# Patient Record
Sex: FEMALE | Race: WHITE | NOT HISPANIC OR LATINO | Employment: OTHER | URBAN - METROPOLITAN AREA
[De-identification: names, ages, dates, MRNs, and addresses within clinical notes are randomized per-mention and may not be internally consistent; named-entity substitution may affect disease eponyms.]

---

## 2019-02-27 ENCOUNTER — OFFICE VISIT (OUTPATIENT)
Dept: FAMILY MEDICINE | Facility: CLINIC | Age: 69
End: 2019-02-27
Payer: COMMERCIAL

## 2019-02-27 VITALS
OXYGEN SATURATION: 98 % | HEIGHT: 64 IN | HEART RATE: 57 BPM | TEMPERATURE: 98 F | WEIGHT: 211 LBS | DIASTOLIC BLOOD PRESSURE: 80 MMHG | BODY MASS INDEX: 36.02 KG/M2 | SYSTOLIC BLOOD PRESSURE: 132 MMHG

## 2019-02-27 DIAGNOSIS — Z76.89 ENCOUNTER TO ESTABLISH CARE: Primary | ICD-10-CM

## 2019-02-27 DIAGNOSIS — Z11.59 NEED FOR HEPATITIS C SCREENING TEST: ICD-10-CM

## 2019-02-27 DIAGNOSIS — Z11.4 ENCOUNTER FOR SCREENING FOR HIV: ICD-10-CM

## 2019-02-27 DIAGNOSIS — E66.01 MORBID OBESITY (H): ICD-10-CM

## 2019-02-27 DIAGNOSIS — I10 ESSENTIAL HYPERTENSION: ICD-10-CM

## 2019-02-27 LAB
ERYTHROCYTE [DISTWIDTH] IN BLOOD BY AUTOMATED COUNT: 14.3 % (ref 10–15)
HCT VFR BLD AUTO: 41.5 % (ref 35–47)
HGB BLD-MCNC: 13.6 G/DL (ref 11.7–15.7)
MCH RBC QN AUTO: 29.8 PG (ref 26.5–33)
MCHC RBC AUTO-ENTMCNC: 32.8 G/DL (ref 31.5–36.5)
MCV RBC AUTO: 91 FL (ref 78–100)
PLATELET # BLD AUTO: 179 10E9/L (ref 150–450)
RBC # BLD AUTO: 4.56 10E12/L (ref 3.8–5.2)
WBC # BLD AUTO: 6.1 10E9/L (ref 4–11)

## 2019-02-27 PROCEDURE — 99203 OFFICE O/P NEW LOW 30 MIN: CPT | Performed by: FAMILY MEDICINE

## 2019-02-27 PROCEDURE — 84443 ASSAY THYROID STIM HORMONE: CPT | Performed by: FAMILY MEDICINE

## 2019-02-27 PROCEDURE — 85027 COMPLETE CBC AUTOMATED: CPT | Performed by: FAMILY MEDICINE

## 2019-02-27 PROCEDURE — 36415 COLL VENOUS BLD VENIPUNCTURE: CPT | Performed by: FAMILY MEDICINE

## 2019-02-27 PROCEDURE — 87389 HIV-1 AG W/HIV-1&-2 AB AG IA: CPT | Performed by: FAMILY MEDICINE

## 2019-02-27 PROCEDURE — 86803 HEPATITIS C AB TEST: CPT | Performed by: FAMILY MEDICINE

## 2019-02-27 PROCEDURE — 80061 LIPID PANEL: CPT | Performed by: FAMILY MEDICINE

## 2019-02-27 PROCEDURE — 80053 COMPREHEN METABOLIC PANEL: CPT | Performed by: FAMILY MEDICINE

## 2019-02-27 SDOH — HEALTH STABILITY: MENTAL HEALTH: HOW OFTEN DO YOU HAVE A DRINK CONTAINING ALCOHOL?: NEVER

## 2019-02-27 ASSESSMENT — MIFFLIN-ST. JEOR: SCORE: 1482.96

## 2019-02-27 NOTE — PROGRESS NOTES
SUBJECTIVE:   Arline Saini is a 68 year old female who presents to clinic today for the following health issues:      New Patient/Transfer of Care  Just moved here from [place].  She does not speak English.  She declined using a .  Daughter is translating for her.  Per daughter lots of health concerns not really sure where to start.    All medications are from [place].  She brought in a list of her medications but did not bring any translation.  She has about 2-4 weeks supply of medications left.  Daughter knows that she has history of hypertension.  She thinks that she might have liver and kidney problems 2.  She had a gallbladder surgery last year.  She has an echo report and a CT scan of the abdomen report from last year, which is in Spanish language.  The patient tells that she is not very good at reading Spanish language.  Daughter thinks she might have immunization records at home.      Patient reports of no chest pain.  Reports of occasional shortness of breath.  Denies any active shortness of breath right now.  She quit smoking last year.  She was smoking a few cigarettes every day for the last 40 years.  Denies wheezing.  Reports of occasional cough.  Daughter wonders if she may have COPD.        Problem list and histories reviewed & adjusted, as indicated.  Additional history: as documented    Patient Active Problem List   Diagnosis     Essential hypertension     Obesity (BMI 35.0-39.9) with comorbidity (H)     Past Surgical History:   Procedure Laterality Date     CHOLECYSTECTOMY  2018       Social History     Tobacco Use     Smoking status: Never Smoker     Smokeless tobacco: Never Used   Substance Use Topics     Alcohol use: No     Frequency: Never     Family History   Problem Relation Age of Onset     Cerebrovascular Disease Mother      Cerebrovascular Disease Sister          No current outpatient medications on file.     No Known Allergies    Reviewed and updated as needed this  "visit by clinical staff  Tobacco  Allergies  Meds  Problems  Med Hx  Surg Hx  Fam Hx  Soc Hx        Reviewed and updated as needed this visit by Provider  Problems  Med Hx  Fam Hx         ROS:  CONSTITUTIONAL: NEGATIVE for fever, chills, change in weight  INTEGUMENTARY/SKIN: NEGATIVE for worrisome rashes, moles or lesions  EYES: NEGATIVE for vision changes or irritation  ENT/MOUTH: NEGATIVE for ear, mouth and throat problems  RESP: NEGATIVE for significant cough or SOB  BREAST: NEGATIVE for masses, tenderness or discharge  CV: NEGATIVE for chest pain, palpitations or peripheral edema  GI: NEGATIVE for nausea, abdominal pain, heartburn, or change in bowel habits  : NEGATIVE for frequency, dysuria, or hematuria  MUSCULOSKELETAL: Complaint of back pain which is chronic.  NEURO: NEGATIVE for weakness, dizziness or paresthesias  ENDOCRINE: NEGATIVE for temperature intolerance, skin/hair changes  HEME: NEGATIVE for bleeding problems  PSYCHIATRIC: NEGATIVE for changes in mood or affect    OBJECTIVE:                                                    /80   Pulse 57   Temp 98  F (36.7  C) (Tympanic)   Ht 1.635 m (5' 4.37\")   Wt 95.7 kg (211 lb)   SpO2 98%   BMI 35.80 kg/m    Body mass index is 35.8 kg/m .  GENERAL: healthy, alert and no distress  EYES: Eyes grossly normal to inspection, extraocular movements - intact, and PERRL  HENT: ear canals- normal; TMs- normal; Nose- normal; Mouth- no ulcers, no lesions  NECK: no tenderness, no adenopathy, no asymmetry, no masses, no stiffness; thyroid- normal to palpation  RESP: lungs clear to auscultation - no rales, no rhonchi, no wheezes  CV: regular rates and rhythm, normal S1 S2, no S3 or S4 and no murmur, no click or rub -  ABDOMEN: soft, no tenderness, no  hepatosplenomegaly, no masses, normal bowel sounds  MS: extremities- no gross deformities noted, no edema  SKIN: no suspicious lesions, no rashes  NEURO: strength and tone- normal, sensory exam- " grossly normal, mentation- intact, speech- normal, reflexes- symmetric  PSYCH: Alert and oriented times 3; speech- coherent , normal rate and volume; able to articulate logical thoughts, able to abstract reason, no tangential thoughts, no hallucinations or delusions, affect- normal  LYMPHATICS: ant. cervical- normal, post. cervical- normal, axillary- normal, supraclavicular- normal, inguinal- normal         ASSESSMENT/PLAN:                                                      1. Encounter to establish care  Patient comes in to establish care.  No  available today.  Daughter is not able to translate the medication list which is in Portuguese language.  Patient reports that she has medication to last her for a few weeks.  Instructed to follow-up next week with a  and translation available of the medication list.  Instructed to get immunization records and also possible translation of the echocardiogram report.  - CBC with platelets    2. Essential hypertension  Well-controlled.  Unsure about 1 medication patient is on.  Fasting labs ordered.  Follow-up next week to sort out her medical problems and medications.  - TSH with free T4 reflex  - Lipid panel reflex to direct LDL Fasting  - Comprehensive metabolic panel    3. Encounter for screening for HIV    - HIV Antigen Antibody Combo    4. Need for hepatitis C screening test    - Hepatitis C Screen Reflex to HCV RNA Quant and Genotype    5. Morbid obesity (H)        Follow up with Provider - 1 week     Oksana Smiley MD  [facility] [place]

## 2019-02-27 NOTE — LETTER
March 4, 2019      Arline Saini  [address]  [place] [address]        Dear ,    I have reviewed your recent labs. Here are the results:    -Normal red blood cell (hgb) levels, normal white blood cell count and normal platelet levels.  -Cholesterol levels (LDL,HDL, Triglycerides) are normal.  ADVISE: rechecking in 1 year.   -Liver and gallbladder tests are normal (ALT,AST, Alk phos, bilirubin), kidney function is normal (Cr, GFR), sodium is normal, potassium is normal, calcium is normal, glucose is normal.  -TSH (thyroid stimulating hormone) level is normal which indicates normal thyroid function.  -Hepatitis C antibody screen test shows no signs of a previous hepatitis C infection.  -HIV test is normal.    Results for orders placed or performed in visit on 02/27/19   TSH with free T4 reflex   Result Value Ref Range    TSH 2.61 0.40 - 4.00 mU/L   Lipid panel reflex to direct LDL Fasting   Result Value Ref Range    Cholesterol 156 <200 mg/dL    Triglycerides 54 <150 mg/dL    HDL Cholesterol 55 >49 mg/dL    LDL Cholesterol Calculated 90 <100 mg/dL    Non HDL Cholesterol 101 <130 mg/dL   Comprehensive metabolic panel   Result Value Ref Range    Sodium 141 133 - 144 mmol/L    Potassium 3.9 3.4 - 5.3 mmol/L    Carbon Dioxide 20 20 - 32 mmol/L    Anion Gap 9 3 - 14 mmol/L    Glucose 93 70 - 99 mg/dL    Urea Nitrogen 28 7 - 30 mg/dL    Creatinine 0.90 0.52 - 1.04 mg/dL    GFR Estimate 65 >60 mL/min/[1.73_m2]    GFR Estimate If Black 76 >60 mL/min/[1.73_m2]    Calcium 8.7 8.5 - 10.1 mg/dL    Bilirubin Total 1.2 0.2 - 1.3 mg/dL    Albumin 3.8 3.4 - 5.0 g/dL    Protein Total 7.3 6.8 - 8.8 g/dL    Alkaline Phosphatase 77 40 - 150 U/L    ALT 24 0 - 50 U/L    AST 20 0 - 45 U/L   CBC with platelets   Result Value Ref Range    WBC 6.1 4.0 - 11.0 10e9/L    RBC Count 4.56 3.8 - 5.2 10e12/L    Hemoglobin 13.6 11.7 - 15.7 g/dL    Hematocrit 41.5 35.0 - 47.0 %    MCV 91 78 - 100 fl    MCH 29.8 26.5 - 33.0  pg    MCHC 32.8 31.5 - 36.5 g/dL    RDW 14.3 10.0 - 15.0 %    Platelet Count 179 150 - 450 10e9/L   HIV Antigen Antibody Combo   Result Value Ref Range    HIV Antigen Antibody Combo Nonreactive NR^Nonreactive       Hepatitis C Screen Reflex to HCV RNA Quant and Genotype   Result Value Ref Range    Hepatitis C Antibody Nonreactive NR^Nonreactive             If you have any questions or concerns, please call the clinic at the number listed above.       Sincerely,        Oksana Smiley MD

## 2019-02-28 LAB
ALBUMIN SERPL-MCNC: 3.8 G/DL (ref 3.4–5)
ALP SERPL-CCNC: 77 U/L (ref 40–150)
ALT SERPL W P-5'-P-CCNC: 24 U/L (ref 0–50)
ANION GAP SERPL CALCULATED.3IONS-SCNC: 9 MMOL/L (ref 3–14)
AST SERPL W P-5'-P-CCNC: 20 U/L (ref 0–45)
BILIRUB SERPL-MCNC: 1.2 MG/DL (ref 0.2–1.3)
BUN SERPL-MCNC: 28 MG/DL (ref 7–30)
CALCIUM SERPL-MCNC: 8.7 MG/DL (ref 8.5–10.1)
CHLORIDE SERPL-SCNC: 112 MMOL/L (ref 94–109)
CHOLEST SERPL-MCNC: 156 MG/DL
CO2 SERPL-SCNC: 20 MMOL/L (ref 20–32)
CREAT SERPL-MCNC: 0.9 MG/DL (ref 0.52–1.04)
GFR SERPL CREATININE-BSD FRML MDRD: 65 ML/MIN/{1.73_M2}
GLUCOSE SERPL-MCNC: 93 MG/DL (ref 70–99)
HCV AB SERPL QL IA: NONREACTIVE
HDLC SERPL-MCNC: 55 MG/DL
HIV 1+2 AB+HIV1 P24 AG SERPL QL IA: NONREACTIVE
LDLC SERPL CALC-MCNC: 90 MG/DL
NONHDLC SERPL-MCNC: 101 MG/DL
POTASSIUM SERPL-SCNC: 3.9 MMOL/L (ref 3.4–5.3)
PROT SERPL-MCNC: 7.3 G/DL (ref 6.8–8.8)
SODIUM SERPL-SCNC: 141 MMOL/L (ref 133–144)
TRIGL SERPL-MCNC: 54 MG/DL
TSH SERPL DL<=0.005 MIU/L-ACNC: 2.61 MU/L (ref 0.4–4)

## 2019-04-24 ENCOUNTER — OFFICE VISIT (OUTPATIENT)
Dept: FAMILY MEDICINE | Facility: CLINIC | Age: 69
End: 2019-04-24
Payer: COMMERCIAL

## 2019-04-24 DIAGNOSIS — E66.01 MORBID OBESITY (H): ICD-10-CM

## 2019-04-24 DIAGNOSIS — M54.50 RIGHT-SIDED LOW BACK PAIN WITHOUT SCIATICA, UNSPECIFIED CHRONICITY: ICD-10-CM

## 2019-04-24 DIAGNOSIS — I10 ESSENTIAL HYPERTENSION: Primary | ICD-10-CM

## 2019-04-24 DIAGNOSIS — H91.93 HEARING PROBLEM OF BOTH EARS: ICD-10-CM

## 2019-04-24 PROCEDURE — 99214 OFFICE O/P EST MOD 30 MIN: CPT | Performed by: INTERNAL MEDICINE

## 2019-04-24 RX ORDER — BISOPROLOL FUMARATE 5 MG/1
5 TABLET, FILM COATED ORAL DAILY
Qty: 30 TABLET | Refills: 1 | Status: SHIPPED | OUTPATIENT
Start: 2019-04-24 | End: 2019-06-25

## 2019-04-24 RX ORDER — AMLODIPINE BESYLATE 5 MG/1
5 TABLET ORAL DAILY
Qty: 30 TABLET | Refills: 1 | Status: SHIPPED | OUTPATIENT
Start: 2019-04-24 | End: 2019-04-24

## 2019-04-24 RX ORDER — LOSARTAN POTASSIUM 100 MG/1
100 TABLET ORAL DAILY
Qty: 30 TABLET | Refills: 1 | Status: SHIPPED | OUTPATIENT
Start: 2019-04-24 | End: 2019-05-22

## 2019-04-24 RX ORDER — LOSARTAN POTASSIUM 100 MG/1
100 TABLET ORAL DAILY
Qty: 30 TABLET | Refills: 1 | Status: SHIPPED | OUTPATIENT
Start: 2019-04-24 | End: 2019-04-24

## 2019-04-24 RX ORDER — SPIRONOLACTONE 25 MG/1
25 TABLET ORAL DAILY
Qty: 30 TABLET | Refills: 1 | Status: SHIPPED | OUTPATIENT
Start: 2019-04-24 | End: 2019-04-24

## 2019-04-24 RX ORDER — AMLODIPINE BESYLATE 5 MG/1
5 TABLET ORAL DAILY
Qty: 30 TABLET | Refills: 1 | Status: SHIPPED | OUTPATIENT
Start: 2019-04-24 | End: 2019-05-15

## 2019-04-24 RX ORDER — BISOPROLOL FUMARATE 5 MG/1
5 TABLET, FILM COATED ORAL DAILY
Qty: 30 TABLET | Refills: 1 | Status: SHIPPED | OUTPATIENT
Start: 2019-04-24 | End: 2019-04-24

## 2019-04-24 RX ORDER — SPIRONOLACTONE 25 MG/1
25 TABLET ORAL DAILY
Qty: 30 TABLET | Refills: 1 | Status: SHIPPED | OUTPATIENT
Start: 2019-04-24 | End: 2019-06-25

## 2019-04-24 NOTE — PROGRESS NOTES
SUBJECTIVE:   Arline Saini is a 68 year old female who presents to clinic today for the following   health issues:      Patient is here with her daughter today for follow up: She last saw Dr. Smiley in February to establish care. She speaks only Cypriot and is accompanied by her daughter as an  since she refuses a formal . At her last visit she brought in her current medications but due to poor translation Dr. Smiley was unable to determine what the medications were.     Arline tells me (through her daughter) that she has hypertension and is very bothered by back pain and right-sided rib pain. She also has had her gallbladder removed. Her back and ribs bother her with yard work and house work.     Medications brought in today:  Lercanidipine 10 mg, Losartan 100 mg, Spironolactone 25 mg, and Bisoprolol 5 mg.       Additional history: as documented    Reviewed  and updated as needed this visit by clinical staff         Reviewed and updated as needed this visit by Provider         Patient Active Problem List   Diagnosis     Essential hypertension     Obesity (BMI 35.0-39.9) with comorbidity (H)     Past Surgical History:   Procedure Laterality Date     CHOLECYSTECTOMY         Social History     Tobacco Use     Smoking status: Former Smoker     Last attempt to quit: 2018     Years since quittin.1     Smokeless tobacco: Never Used     Tobacco comment: Smoked a few cigarettes every day for 40 years.   Substance Use Topics     Alcohol use: No     Frequency: Never     Family History   Problem Relation Age of Onset     Cerebrovascular Disease Mother      Cerebrovascular Disease Sister            ROS:  Constitutional, HEENT, cardiovascular, pulmonary, gi and gu systems are negative, except as otherwise noted.    OBJECTIVE:     /88   Pulse 70   Temp 97.6  F (36.4  C) (Tympanic)   Wt 98.4 kg (217 lb)   SpO2 97%   BMI 36.82 kg/m    Body mass index is 36.82 kg/m .  GENERAL:  healthy, alert and no distress  NECK: no adenopathy, no asymmetry, masses, or scars and thyroid normal to palpation  RESP: lungs clear to auscultation - no rales, rhonchi or wheezes  CV: regular rate and rhythm, normal S1 S2, no S3 or S4, no murmur, click or rub, no peripheral edema and peripheral pulses strong  ABDOMEN: soft, nontender, no hepatosplenomegaly, no masses and bowel sounds normal  MS: no gross musculoskeletal defects noted, no edema  Comprehensive back pain exam:  Range of motion not limited by pain, Lower extremity strength functional and equal on both sides, Lower extremity reflexes within normal limits bilaterally and Straight leg raise negative bilaterally    Diagnostic Test Results:  No results found for this or any previous visit (from the past 24 hour(s)).    ASSESSMENT/PLAN:       1. Essential hypertension  Refilling patients medications today and substituting amlodipine 5 mg for Lercanidipine 10 mg (I am assuming this medication is a calcium channel blocker based on it's name). We may need to titrate these medications up or down depending on response. I am recommending an echocardiogram and have ordered this for her. I am also placing a referral to cardiology per daughter's request.   - CARDIOLOGY EVAL ADULT REFERRAL  - Echocardiogram Complete; Future  - amLODIPine (NORVASC) 5 MG tablet; Take 1 tablet (5 mg) by mouth daily  Dispense: 30 tablet; Refill: 1  - bisoprolol (ZEBETA) 5 MG tablet; Take 1 tablet (5 mg) by mouth daily  Dispense: 30 tablet; Refill: 1  - losartan (COZAAR) 100 MG tablet; Take 1 tablet (100 mg) by mouth daily  Dispense: 30 tablet; Refill: 1  - spironolactone (ALDACTONE) 25 MG tablet; Take 1 tablet (25 mg) by mouth daily  Dispense: 30 tablet; Refill: 1    2. Obesity (BMI 35.0-39.9) with comorbidity (H)    3. Right-sided low back pain without sciatica, unspecified chronicity  No red flags on exam today. Recommending physical therapy.   - [facility]; Future  - UA with Microscopic reflex to Culture; Future  - Urine Culture Aerobic Bacterial    4. Hearing problem of both ears  - AUDIOLOGY ADULT REFERRAL    Follow up with me in 4 weeks    Chel Martinez MD  [facility] [place]

## 2019-04-25 VITALS
OXYGEN SATURATION: 97 % | BODY MASS INDEX: 36.82 KG/M2 | DIASTOLIC BLOOD PRESSURE: 88 MMHG | TEMPERATURE: 97.6 F | SYSTOLIC BLOOD PRESSURE: 136 MMHG | HEART RATE: 70 BPM | WEIGHT: 217 LBS

## 2019-04-25 DIAGNOSIS — M54.50 RIGHT-SIDED LOW BACK PAIN WITHOUT SCIATICA, UNSPECIFIED CHRONICITY: ICD-10-CM

## 2019-04-25 LAB
ALBUMIN UR-MCNC: NEGATIVE MG/DL
APPEARANCE UR: CLEAR
BACTERIA #/AREA URNS HPF: ABNORMAL /HPF
BILIRUB UR QL STRIP: NEGATIVE
COLOR UR AUTO: YELLOW
GLUCOSE UR STRIP-MCNC: NEGATIVE MG/DL
HGB UR QL STRIP: NEGATIVE
KETONES UR STRIP-MCNC: NEGATIVE MG/DL
LEUKOCYTE ESTERASE UR QL STRIP: ABNORMAL
NITRATE UR QL: NEGATIVE
NON-SQ EPI CELLS #/AREA URNS LPF: ABNORMAL /LPF
PH UR STRIP: 5.5 PH (ref 5–7)
RBC #/AREA URNS AUTO: ABNORMAL /HPF
SOURCE: ABNORMAL
SP GR UR STRIP: 1.02 (ref 1–1.03)
UROBILINOGEN UR STRIP-ACNC: 0.2 EU/DL (ref 0.2–1)
WBC #/AREA URNS AUTO: ABNORMAL /HPF

## 2019-04-25 PROCEDURE — 87086 URINE CULTURE/COLONY COUNT: CPT | Performed by: INTERNAL MEDICINE

## 2019-04-25 PROCEDURE — 81001 URINALYSIS AUTO W/SCOPE: CPT | Performed by: INTERNAL MEDICINE

## 2019-04-26 LAB
BACTERIA SPEC CULT: NORMAL
BACTERIA SPEC CULT: NORMAL
SPECIMEN SOURCE: NORMAL

## 2019-04-29 ENCOUNTER — THERAPY VISIT (OUTPATIENT)
Dept: PHYSICAL THERAPY | Facility: CLINIC | Age: 69
End: 2019-04-29
Payer: COMMERCIAL

## 2019-04-29 DIAGNOSIS — M54.50 RIGHT-SIDED LOW BACK PAIN WITHOUT SCIATICA, UNSPECIFIED CHRONICITY: ICD-10-CM

## 2019-04-29 DIAGNOSIS — M54.6 THORACOLUMBAR BACK PAIN: ICD-10-CM

## 2019-04-29 DIAGNOSIS — M54.50 THORACOLUMBAR BACK PAIN: ICD-10-CM

## 2019-04-29 PROCEDURE — 97110 THERAPEUTIC EXERCISES: CPT | Mod: GP | Performed by: PHYSICAL THERAPIST

## 2019-04-29 PROCEDURE — 97161 PT EVAL LOW COMPLEX 20 MIN: CPT | Mod: GP | Performed by: PHYSICAL THERAPIST

## 2019-04-29 NOTE — PROGRESS NOTES
[facility] Initial Evaluation  Subjective:  Patient is referred with a long hx of thoracolumbar pain, R>L. Sxs are intermittent and worse with activity, especially lifting, carrying and twisting to the right. She is uncomfortable lying on her back and sleeps on her sides. No pain with cough/sneeze. No dx testing performed to this point.    The history is provided by the patient and a relative. A  was used.   Arline Saini is a 68 year old female with a thoracic spine condition.  Condition occurred with:  Insidious onset.  Condition occurred: for unknown reasons.  This is a chronic condition     Patient reports pain:  Mid thoracic, thoracic right side and lower thoracic.  Radiates to: rib cage right.  Pain is described as aching and is intermittent and reported as 5/10.  Associated symptoms:  Loss of motion/stiffness. Pain is worse during the day.  Symptoms are exacerbated by lying down, carrying and lifting (trunk rotation right) and relieved by nothing.  Since onset symptoms are unchanged.        General health as reported by patient is good.  Pertinent medical history includes:  High blood pressure and heart problems.      Current medications:  High blood pressure medication.          Barriers include:  None as reported by the patient.    Red flags:  None as reported by the patient.                        Objective:  System    Physical Exam                           Asad Thoracic Evalution:  Posture:  Sitting: poor  Standing: fair  Protruded Head: no  Kyphosis: accentuated  Correction of Posture: no effect    Movement Loss:  Flexion (Flex):  Nil  Extension (EXT): mod and pain  Rotation Lt (ROT L): nil  Rotation Rt (ROT R): min and pain  Cervical Differential:                Rep Flex:  During:  Increases  After: worse  Mechanical Response:  No effect  Test Movements:  Flexion:  During:  Increases  After: no worse  Mechanical Response:  No effect  Rep Flexion:   During:  Increases  After: worse  Mechanical Response: no effect  Extension:  During:  Increases  After: no worse  Mechanical Response: no effect  Rep Extension:  During:  Increases  After: better  Mechanical Response: IncROM    Pretest symptoms: no pain          Pretest symptoms--Sitting:  No pain  Rot Rt:  During:  Produces  After: no worse  Mechanical Response: no effect  Rep ROT Rt: During:  Produces  After: worse  Mechanical Response: no effect      Static Tests:  Extension:  Prone (no change)    Conclusion: other  Principle of Treatment:      Extension:  Trial of thoracic extension in sitting x 10/ 4 x daily    Other:  Prone lying and ACE 4 x daily                  ROS    Assessment/Plan:    Patient is a 68 year old female with thoracic and lumbar complaints.    Patient has the following significant findings with corresponding treatment plan.                Diagnosis 1:  Thoracolumbar pain  Pain -  manual therapy, self management, education, directional preference exercise and home program  Decreased ROM/flexibility - manual therapy, therapeutic exercise and home program  Decreased joint mobility - manual therapy, therapeutic exercise and home program  Decreased function - therapeutic activities and home program  Impaired posture - neuro re-education and home program    Therapy Evaluation Codes:   1) History comprised of:   Personal factors that impact the plan of care:      Language and Time since onset of symptoms.    Comorbidity factors that impact the plan of care are:      None.     Medications impacting care: None.  2) Examination of Body Systems comprised of:   Body structures and functions that impact the plan of care:      Lumbar spine and Thoracic Spine.   Activity limitations that impact the plan of care are:      Bending and Lifting.  3) Clinical presentation characteristics are:   Stable/Uncomplicated.  4) Decision-Making    Low complexity using standardized patient assessment instrument and/or  measureable assessment of functional outcome.  Cumulative Therapy Evaluation is: Low complexity.    Previous and current functional limitations:  (See Goal Flow Sheet for this information)    Short term and Long term goals: (See Goal Flow Sheet for this information)     Communication ability:  Patient has an  for communication clarity.  Treatment Explanation - The following has been discussed with the patient:   RX ordered/plan of care  Anticipated outcomes  Possible risks and side effects  This patient would benefit from PT intervention to resume normal activities.   Rehab potential is good.    Frequency:  1 X week, once daily  Duration:  for 4 weeks  Discharge Plan:  Achieve all LTG.  Independent in home treatment program.  Reach maximal therapeutic benefit.    Please refer to the daily flowsheet for treatment today, total treatment time and time spent performing 1:1 timed codes.

## 2019-04-29 NOTE — LETTER
[facility]  [address] [place] [address]  [address]  [facility] [id number] [phone]    2019    Re: Arline Saini   :   1950  MRN:  [id number]   REFERRING PHYSICIAN:   Chel Martinez    [facility]    Date of Initial Evaluation:  19  Visits:  Rxs Used: 1  Reason for Referral:     Right-sided low back pain without sciatica, unspecified chronicity  Thoracolumbar back pain    EVALUATION SUMMARY    [facility]  Subjective:  Patient is referred with a long hx of thoracolumbar pain, R>L. Sxs are intermittent and worse with activity, especially lifting, carrying and twisting to the right. She is uncomfortable lying on her back and sleeps on her sides. No pain with cough/sneeze. No dx testing performed to this point.  The history is provided by the patient and a relative. A  was used.   Arline Saini is a 68 year old female with a thoracic spine condition.  Condition occurred with:  Insidious onset.  Condition occurred: for unknown reasons.  This is a chronic condition     Patient reports pain:  Mid thoracic, thoracic right side and lower thoracic.  Radiates to: rib cage right.  Pain is described as aching and is intermittent and reported as 5/10.  Associated symptoms:  Loss of motion/stiffness. Pain is worse during the day.  Symptoms are exacerbated by lying down, carrying and lifting (trunk rotation right) and relieved by nothing.  Since onset symptoms are unchanged.        General health as reported by patient is good.  Pertinent medical history includes:  High blood pressure and heart problems. Current medications:  High blood pressure medication.  Barriers include:  None as reported by the patient.Red flags:  None as reported by the patient.  Objective:  System  Physical Exam                        Asad Thoracic  Evalution:  Posture:  Sitting: poor  Standing: fair  Protruded Head: no  Kyphosis: accentuated  Correction of Posture: no effect  Movement Loss:  Flexion (Flex):  Nil  Re: Arline Saini   :   1950    Extension (EXT): mod and pain  Rotation Lt (ROT L): nil  Rotation Rt (ROT R): min and pain  Cervical Differential:  Rep Flex:  During:  Increases  After: worse  Mechanical Response:  No effect  Test Movements:  Flexion:  During:  Increases  After: no worse  Mechanical Response:  No effect  Rep Flexion:  During:  Increases  After: worse  Mechanical Response: no effect  Extension:  During:  Increases  After: no worse  Mechanical Response: no effect  Rep Extension:  During:  Increases  After: better  Mechanical Response: IncROM  Pretest symptoms: no pain  Pretest symptoms--Sitting:  No pain  Rot Rt:  During:  Produces  After: no worse  Mechanical Response: no effect  Rep ROT Rt: During:  Produces  After: worse  Mechanical Response: no effect  Static Tests:  Extension:  Prone (no change)  Conclusion: other  Principle of Treatment:  Extension:  Trial of thoracic extension in sitting x 10/ 4 x daily  Other:  Prone lying and ACE 4 x daily                Assessment/Plan:    Patient is a 68 year old female with thoracic and lumbar complaints.    Patient has the following significant findings with corresponding treatment plan.                Diagnosis 1:  Thoracolumbar pain  Pain -  manual therapy, self management, education, directional preference exercise and home program  Decreased ROM/flexibility - manual therapy, therapeutic exercise and home program  Decreased joint mobility - manual therapy, therapeutic exercise and home program  Decreased function - therapeutic activities and home program  Impaired posture - neuro re-education and home program  Therapy Evaluation Codes:   1) History comprised of:   Personal factors that impact the plan of care:      Language and Time since onset of symptoms.    Comorbidity  factors that impact the plan of care are:      None.     Medications impacting care: None.  2) Examination of Body Systems comprised of:   Body structures and functions that impact the plan of care:      Lumbar spine and Thoracic Spine.   Activity limitations that impact the plan of care are:      Bending and Lifting.  3) Clinical presentation characteristics are:   Stable/Uncomplicated.  4) Decision-Making    Low complexity using standardized patient assessment instrument and/or measureable assessment of functional outcome.  Cumulative Therapy Evaluation is: Low complexity.  Previous and current functional limitations:  (See Goal Flow Sheet for this information)    Short term and Long term goals: (See Goal Flow Sheet for this information)   Re: Arline Saini   :   1950    Communication ability:  Patient has an  for communication clarity.  Treatment Explanation - The following has been discussed with the patient:   RX ordered/plan of care  Anticipated outcomes; Possible risks and side effects.  This patient would benefit from PT intervention to resume normal activities.   Rehab potential is good.  Frequency:  1 X week, once daily  Duration:  for 4 weeks  Discharge Plan:  Achieve all LTG.  Independent in home treatment program.  Reach maximal therapeutic benefit.    Thank you for your referral.    INQUIRIES  Therapist: Rick Andrea, PT  [facility] [place] [facility]  [address] [place] [address] Drive  [address]  [facility] [address]  Phone: [phone]  Fax: [phone]

## 2019-05-02 ENCOUNTER — HOSPITAL ENCOUNTER (OUTPATIENT)
Dept: CARDIOLOGY | Facility: CLINIC | Age: 69
Discharge: HOME | End: 2019-05-02
Attending: INTERNAL MEDICINE | Admitting: INTERNAL MEDICINE
Payer: COMMERCIAL

## 2019-05-02 DIAGNOSIS — I10 ESSENTIAL HYPERTENSION: ICD-10-CM

## 2019-05-02 PROCEDURE — 93306 TTE W/DOPPLER COMPLETE: CPT | Mod: 26 | Performed by: INTERNAL MEDICINE

## 2019-05-02 PROCEDURE — 93306 TTE W/DOPPLER COMPLETE: CPT

## 2019-05-15 ENCOUNTER — OFFICE VISIT (OUTPATIENT)
Dept: CARDIOLOGY | Facility: CLINIC | Age: 69
End: 2019-05-15
Attending: INTERNAL MEDICINE
Payer: COMMERCIAL

## 2019-05-15 VITALS
SYSTOLIC BLOOD PRESSURE: 124 MMHG | BODY MASS INDEX: 35.56 KG/M2 | WEIGHT: 213.4 LBS | DIASTOLIC BLOOD PRESSURE: 88 MMHG | HEART RATE: 60 BPM | HEIGHT: 65 IN

## 2019-05-15 DIAGNOSIS — R06.09 DOE (DYSPNEA ON EXERTION): ICD-10-CM

## 2019-05-15 DIAGNOSIS — I10 ESSENTIAL HYPERTENSION: Primary | ICD-10-CM

## 2019-05-15 PROCEDURE — 93000 ELECTROCARDIOGRAM COMPLETE: CPT | Performed by: INTERNAL MEDICINE

## 2019-05-15 PROCEDURE — 99204 OFFICE O/P NEW MOD 45 MIN: CPT | Performed by: INTERNAL MEDICINE

## 2019-05-15 ASSESSMENT — MIFFLIN-ST. JEOR: SCORE: 1490.92

## 2019-05-15 NOTE — LETTER
5/15/2019    [address] [address]    RE: Arline Saini       Dear Colleague,    I had the pleasure of seeing Arline Saini in the [facility].    HPI and Plan:   See dictation(#[id number])    Orders Placed This Encounter   Procedures     MRI Cardiac w/contrast and stress     Follow-Up with Cardiac Advanced Practice Provider     EKG 12-lead complete w/read - Clinics (performed today)       Orders Placed This Encounter   Medications     NIFEdipine ER (ADALAT CC) 60 MG 24 hr tablet     Sig: Take 1 tablet (60 mg) by mouth daily     Dispense:  90 tablet     Refill:  3       Medications Discontinued During This Encounter   Medication Reason     amLODIPine (NORVASC) 5 MG tablet          Encounter Diagnoses   Name Primary?     Essential hypertension Yes     IGLESIAS (dyspnea on exertion)        CURRENT MEDICATIONS:  Current Outpatient Medications   Medication Sig Dispense Refill     bisoprolol (ZEBETA) 5 MG tablet Take 1 tablet (5 mg) by mouth daily 30 tablet 1     losartan (COZAAR) 100 MG tablet Take 1 tablet (100 mg) by mouth daily 30 tablet 1     NIFEdipine ER (ADALAT CC) 60 MG 24 hr tablet Take 1 tablet (60 mg) by mouth daily 90 tablet 3     spironolactone (ALDACTONE) 25 MG tablet Take 1 tablet (25 mg) by mouth daily 30 tablet 1       ALLERGIES   No Known Allergies    PAST MEDICAL HISTORY:  Past Medical History:   Diagnosis Date     Essential hypertension 2/27/2019     Obesity (BMI 35.0-39.9) with comorbidity (H) 2/27/2019       PAST SURGICAL HISTORY:  Past Surgical History:   Procedure Laterality Date     CHOLECYSTECTOMY  2018       FAMILY HISTORY:  Family History   Problem Relation Age of Onset     Cerebrovascular Disease Mother      Cerebrovascular Disease Sister 40     Unknown/Adopted Father        SOCIAL HISTORY:  Social History     Socioeconomic History     Marital status:      Spouse name: None     Number of children:  None     Years of education: None     Highest education level: None   Occupational History     None   Social Needs     Financial resource strain: None     Food insecurity:     Worry: None     Inability: None     Transportation needs:     Medical: None     Non-medical: None   Tobacco Use     Smoking status: Former Smoker     Last attempt to quit: 2018     Years since quittin.2     Smokeless tobacco: Never Used     Tobacco comment: Smoked a few cigarettes every day for 40 years.   Substance and Sexual Activity     Alcohol use: No     Frequency: Never     Drug use: No     Sexual activity: Never   Lifestyle     Physical activity:     Days per week: None     Minutes per session: None     Stress: None   Relationships     Social connections:     Talks on phone: None     Gets together: None     Attends Rastafarian service: None     Active member of club or organization: None     Attends meetings of clubs or organizations: None     Relationship status: None     Intimate partner violence:     Fear of current or ex partner: None     Emotionally abused: None     Physically abused: None     Forced sexual activity: None   Other Topics Concern     Parent/sibling w/ CABG, MI or angioplasty before 65F 55M? Not Asked   Social History Narrative     None       Review of Systems:  Skin:  Negative       Eyes:  Positive for glasses    ENT:  Negative      Respiratory:  Positive for       Cardiovascular:  Negative;syncope or near-syncope;cyanosis;palpitations;fatigue;lightheadedness;dizziness Positive for;chest pain edema, LE, since starting amlodipine  Gastroenterology: Negative      Genitourinary:  Positive for decreased urinary stream;retention    Musculoskeletal:  Positive for back pain muscle aches  Neurologic:  Positive for numbness or tingling of feet    Psychiatric:         Heme/Lymph/Imm:  Negative      Endocrine:  Negative        Physical Exam:  Vitals: /88 (BP Location: Left arm, Cuff Size: Adult Large)   Pulse 60   " Ht 1.638 m (5' 4.5\")   Wt 96.8 kg (213 lb 6.4 oz)   BMI 36.06 kg/m       Constitutional:           Skin:             Head:           Eyes:           Lymph:      ENT:           Neck:           Respiratory:            Cardiac:                                                           GI:           Extremities and Muscular Skeletal:                 Neurological:           Psych:             CC  Chel Martinez MD  [address] [place] [id number]                    Thank you for allowing me to participate in the care of your patient.      Sincerely,     Nigel Walker MD     [facility]    cc:   Chel Martinez MD  [address] [place] [id number]        "

## 2019-05-15 NOTE — LETTER
5/15/2019      Chel Martinez MD  [address] [place] [address]      RE: Arline Saini       Dear Colleague,    I had the pleasure of seeing Arline Saini in the [facility].    Service Date: 05/15/2019      REASON FOR CARDIOLOGY VISIT:  Hypertension.      HISTORY OF PRESENT ILLNESS:  Ms. Saini is a very pleasant 69-year-old female originally from [facility], who is coming to Cardiology Clinic accompanied by her daughter.  The patient has longstanding history of hypertension and was recently seen by her primary care physician, Dr. Martinez.  It looks like she was on 100 mg of losartan, 25 mg of spironolactone, 5 mg of bisoprolol, and she was also recently on 10 mg of lercanidipine.  The lercanidipine, because of unavailability of the same medication, was replaced appropriately with amlodipine 5 mg daily.  Her blood pressure is reasonably controlled at 124/88.  The patient did notice mild swelling over the lower extremity with amlodipine, which she did not notice with the previous calcium channel blocker.  She does have shortness of breath which is longstanding.  This happens with exertion.  It is not progressive.  No chest discomfort.  The patient used to work as a  back in [facility].  Her weight is 213 pounds with a BMI of 36, and she has always weighed around the same region.  She does not do any dedicated exercise.  She does not have diabetes.  She used to smoke but has quit for many years.  There is no family history of premature coronary artery disease.  The patient does not have diabetes or any known dyslipidemia.  EKG today shows sinus bradycardia.  No dizziness, presyncope or syncope.      PHYSICAL EXAMINATION:   VITAL SIGNS:  Blood pressure 124/88, heart rate 60 regular, weight 213 pounds, BMI 36.   GENERAL:  The patient appears pleasant, comfortable.   NECK:  Normal JVP, no bruit.   CARDIOVASCULAR SYSTEM:  S1, S2 normal, no  murmur, rub or gallop.   RESPIRATORY SYSTEM:  Clear to auscultation bilaterally.   GASTROINTESTINAL SYSTEM:  Abdomen soft, nontender.   EXTREMITIES:  No pitting pedal edema.   NEUROLOGICAL:  Alert, oriented x3.   PSYCH:  Normal affect.   SKIN:  No obvious rash.   HEENT:  No pallor or icterus.      Echocardiogram done recently shows LVEF of 55%-60% with mild LVH.  Ascending aorta was noted to be mildly dilated at 4 cm.      IMPRESSION AND PLAN:  A pleasant 69-year-old female with longstanding history of hypertension, seems well controlled on current combination of medications noted above.  She has noticed some swelling with amlodipine.  I discussed the option of continuing amlodipine if the mild swelling is tolerable versus using alternative medication like a diuretic like hydrochlorothiazide.  The patient and patient's daughter tell me that they would prefer not to use hydrochlorothiazide and would like to replace something in the similar category like calcium channel blocker.  One option is to try nifedipine extended-release and see if she has any leg swelling with that medication, as with the previous channel blocker as noted above, she did not have any leg swelling.  I recommend stopping amlodipine and starting nifedipine extended-release 60 mg daily.  She can continue followup with Dr. Martinez regarding continued hypertension management, as it seems to be well controlled on current regimen.  In future if she does not tolerate nifedipine, one option is to use the hydrochlorothiazide at that time.  She does complain of dyspnea on exertion which is longstanding.  This could be from obesity, physical deconditioning.  Anginal equivalent cannot be completely ruled out.  At this time, I recommend doing stress testing.  We discussed option of cardiac MRI stress testing, and patient is willing to proceed with that.  I am recommending a followup in about a month with an PRICILLA to go over the results of the stress testing, and  if no significant abnormalities are seen, we will be happy to see her in Cardiology Clinic on as-needed basis.  She will continue to follow up with Dr. Martinez regarding hypertension.  Mildly dilated ascending aorta was noted on echocardiogram.  I would recommend that she can have a repeat echocardiogram through her primary care physician in about a year's time to follow up on the mildly dilated ascending aorta.      cc:   [place] [address]         FELIX WALKER MD             D: 05/15/2019   T: 05/15/2019   MT: MARÍA      Name:     SARA MCKEON   MRN:      [id number]        Account:      [id number]   :      1950           Service Date: 05/15/2019      Document: [id number]         Outpatient Encounter Medications as of 5/15/2019   Medication Sig Dispense Refill     bisoprolol (ZEBETA) 5 MG tablet Take 1 tablet (5 mg) by mouth daily 30 tablet 1     losartan (COZAAR) 100 MG tablet Take 1 tablet (100 mg) by mouth daily 30 tablet 1     NIFEdipine ER (ADALAT CC) 60 MG 24 hr tablet Take 1 tablet (60 mg) by mouth daily 90 tablet 3     spironolactone (ALDACTONE) 25 MG tablet Take 1 tablet (25 mg) by mouth daily 30 tablet 1     [DISCONTINUED] amLODIPine (NORVASC) 5 MG tablet Take 1 tablet (5 mg) by mouth daily 30 tablet 1     No facility-administered encounter medications on file as of 5/15/2019.        Again, thank you for allowing me to participate in the care of your patient.      Sincerely,    Felix Walker MD     [facility]

## 2019-05-15 NOTE — PROGRESS NOTES
HPI and Plan:   See dictation(#[id number])    Orders Placed This Encounter   Procedures     MRI Cardiac w/contrast and stress     Follow-Up with Cardiac Advanced Practice Provider     EKG 12-lead complete w/read - Clinics (performed today)       Orders Placed This Encounter   Medications     NIFEdipine ER (ADALAT CC) 60 MG 24 hr tablet     Sig: Take 1 tablet (60 mg) by mouth daily     Dispense:  90 tablet     Refill:  3       Medications Discontinued During This Encounter   Medication Reason     amLODIPine (NORVASC) 5 MG tablet          Encounter Diagnoses   Name Primary?     Essential hypertension Yes     IGLESIAS (dyspnea on exertion)        CURRENT MEDICATIONS:  Current Outpatient Medications   Medication Sig Dispense Refill     bisoprolol (ZEBETA) 5 MG tablet Take 1 tablet (5 mg) by mouth daily 30 tablet 1     losartan (COZAAR) 100 MG tablet Take 1 tablet (100 mg) by mouth daily 30 tablet 1     NIFEdipine ER (ADALAT CC) 60 MG 24 hr tablet Take 1 tablet (60 mg) by mouth daily 90 tablet 3     spironolactone (ALDACTONE) 25 MG tablet Take 1 tablet (25 mg) by mouth daily 30 tablet 1       ALLERGIES   No Known Allergies    PAST MEDICAL HISTORY:  Past Medical History:   Diagnosis Date     Essential hypertension 2/27/2019     Obesity (BMI 35.0-39.9) with comorbidity (H) 2/27/2019       PAST SURGICAL HISTORY:  Past Surgical History:   Procedure Laterality Date     CHOLECYSTECTOMY  2018       FAMILY HISTORY:  Family History   Problem Relation Age of Onset     Cerebrovascular Disease Mother      Cerebrovascular Disease Sister 40     Unknown/Adopted Father        SOCIAL HISTORY:  Social History     Socioeconomic History     Marital status:      Spouse name: None     Number of children: None     Years of education: None     Highest education level: None   Occupational History     None   Social Needs     Financial resource strain: None     Food insecurity:     Worry: None     Inability: None     Transportation needs:      "Medical: None     Non-medical: None   Tobacco Use     Smoking status: Former Smoker     Last attempt to quit: 2018     Years since quittin.2     Smokeless tobacco: Never Used     Tobacco comment: Smoked a few cigarettes every day for 40 years.   Substance and Sexual Activity     Alcohol use: No     Frequency: Never     Drug use: No     Sexual activity: Never   Lifestyle     Physical activity:     Days per week: None     Minutes per session: None     Stress: None   Relationships     Social connections:     Talks on phone: None     Gets together: None     Attends Jew service: None     Active member of club or organization: None     Attends meetings of clubs or organizations: None     Relationship status: None     Intimate partner violence:     Fear of current or ex partner: None     Emotionally abused: None     Physically abused: None     Forced sexual activity: None   Other Topics Concern     Parent/sibling w/ CABG, MI or angioplasty before 65F 55M? Not Asked   Social History Narrative     None       Review of Systems:  Skin:  Negative       Eyes:  Positive for glasses    ENT:  Negative      Respiratory:  Positive for       Cardiovascular:  Negative;syncope or near-syncope;cyanosis;palpitations;fatigue;lightheadedness;dizziness Positive for;chest pain edema, LE, since starting amlodipine  Gastroenterology: Negative      Genitourinary:  Positive for decreased urinary stream;retention    Musculoskeletal:  Positive for back pain muscle aches  Neurologic:  Positive for numbness or tingling of feet    Psychiatric:         Heme/Lymph/Imm:  Negative      Endocrine:  Negative        Physical Exam:  Vitals: /88 (BP Location: Left arm, Cuff Size: Adult Large)   Pulse 60   Ht 1.638 m (5' 4.5\")   Wt 96.8 kg (213 lb 6.4 oz)   BMI 36.06 kg/m      Constitutional:           Skin:             Head:           Eyes:           Lymph:      ENT:           Neck:           Respiratory:            Cardiac:            "                                                GI:           Extremities and Muscular Skeletal:                 Neurological:           Psych:             CC  Chel Martinez MD  [address] [place] [id number]

## 2019-05-15 NOTE — PROGRESS NOTES
Service Date: 05/15/2019      REASON FOR CARDIOLOGY VISIT:  Hypertension.      HISTORY OF PRESENT ILLNESS:  Ms. Saini is a very pleasant 69-year-old female originally from [facility], who is coming to Cardiology Clinic accompanied by her daughter.  The patient has longstanding history of hypertension and was recently seen by her primary care physician, Dr. Martinez.  It looks like she was on 100 mg of losartan, 25 mg of spironolactone, 5 mg of bisoprolol, and she was also recently on 10 mg of lercanidipine.  The lercanidipine, because of unavailability of the same medication, was replaced appropriately with amlodipine 5 mg daily.  Her blood pressure is reasonably controlled at 124/88.  The patient did notice mild swelling over the lower extremity with amlodipine, which she did not notice with the previous calcium channel blocker.  She does have shortness of breath which is longstanding.  This happens with exertion.  It is not progressive.  No chest discomfort.  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  Her weight is 213 pounds with a BMI of 36, and she has always weighed around the same region.  She does not do any dedicated exercise.  She does not have diabetes.  She used to smoke but has quit for many years.  There is no family history of premature coronary artery disease.  The patient does not have diabetes or any known dyslipidemia.  EKG today shows sinus bradycardia.  No dizziness, presyncope or syncope.      PHYSICAL EXAMINATION:   VITAL SIGNS:  Blood pressure 124/88, heart rate 60 regular, weight 213 pounds, BMI 36.   GENERAL:  The patient appears pleasant, comfortable.   NECK:  Normal JVP, no bruit.   CARDIOVASCULAR SYSTEM:  S1, S2 normal, no murmur, rub or gallop.   RESPIRATORY SYSTEM:  Clear to auscultation bilaterally.   GASTROINTESTINAL SYSTEM:  Abdomen soft, nontender.   EXTREMITIES:  No pitting pedal edema.   NEUROLOGICAL:  Alert, oriented x3.   PSYCH:  Normal affect.    SKIN:  No obvious rash.   HEENT:  No pallor or icterus.      Echocardiogram done recently shows LVEF of 55%-60% with mild LVH.  Ascending aorta was noted to be mildly dilated at 4 cm.      IMPRESSION AND PLAN:  A pleasant 69-year-old female with longstanding history of hypertension, seems well controlled on current combination of medications noted above.  She has noticed some swelling with amlodipine.  I discussed the option of continuing amlodipine if the mild swelling is tolerable versus using alternative medication like a diuretic like hydrochlorothiazide.  The patient and patient's daughter tell me that they would prefer not to use hydrochlorothiazide and would like to replace something in the similar category like calcium channel blocker.  One option is to try nifedipine extended-release and see if she has any leg swelling with that medication, as with the previous channel blocker as noted above, she did not have any leg swelling.  I recommend stopping amlodipine and starting nifedipine extended-release 60 mg daily.  She can continue followup with Dr. Martinez regarding continued hypertension management, as it seems to be well controlled on current regimen.  In future if she does not tolerate nifedipine, one option is to use the hydrochlorothiazide at that time.  She does complain of dyspnea on exertion which is longstanding.  This could be from obesity, physical deconditioning.  Anginal equivalent cannot be completely ruled out.  At this time, I recommend doing stress testing.  We discussed option of cardiac MRI stress testing, and patient is willing to proceed with that.  I am recommending a followup in about a month with an PRICILLA to go over the results of the stress testing, and if no significant abnormalities are seen, we will be happy to see her in Cardiology Clinic on as-needed basis.  She will continue to follow up with Dr. Martinez regarding hypertension.  Mildly dilated ascending aorta was noted on  echocardiogram.  I would recommend that she can have a repeat echocardiogram through her primary care physician in about a year's time to follow up on the mildly dilated ascending aorta.      cc:   Chel Martinez MD    [address]  [id number]         FELIX VALERA MD             D: 05/15/2019   T: 05/15/2019   MT: MARÍA      Name:     SARA MCKEON   MRN:      [id number]        Account:      [id number]   :      1950           Service Date: 05/15/2019      Document: [id number]

## 2019-05-22 ENCOUNTER — OFFICE VISIT (OUTPATIENT)
Dept: FAMILY MEDICINE | Facility: CLINIC | Age: 69
End: 2019-05-22
Payer: COMMERCIAL

## 2019-05-22 VITALS
HEART RATE: 67 BPM | WEIGHT: 214 LBS | BODY MASS INDEX: 36.17 KG/M2 | OXYGEN SATURATION: 96 % | TEMPERATURE: 97.9 F | DIASTOLIC BLOOD PRESSURE: 73 MMHG | SYSTOLIC BLOOD PRESSURE: 110 MMHG

## 2019-05-22 DIAGNOSIS — E66.01 MORBID OBESITY (H): ICD-10-CM

## 2019-05-22 DIAGNOSIS — M54.6 THORACOLUMBAR BACK PAIN: ICD-10-CM

## 2019-05-22 DIAGNOSIS — I10 ESSENTIAL HYPERTENSION: Primary | ICD-10-CM

## 2019-05-22 DIAGNOSIS — I77.810 AORTIC ROOT DILATATION (H): ICD-10-CM

## 2019-05-22 DIAGNOSIS — M54.50 THORACOLUMBAR BACK PAIN: ICD-10-CM

## 2019-05-22 PROCEDURE — 99214 OFFICE O/P EST MOD 30 MIN: CPT | Performed by: INTERNAL MEDICINE

## 2019-05-22 RX ORDER — LOSARTAN POTASSIUM 50 MG/1
50 TABLET ORAL 2 TIMES DAILY
Qty: 180 TABLET | Refills: 1 | Status: SHIPPED | OUTPATIENT
Start: 2019-05-22 | End: 2019-12-31

## 2019-05-22 RX ORDER — LOSARTAN POTASSIUM 50 MG/1
50 TABLET ORAL 2 TIMES DAILY
Qty: 60 TABLET | Refills: 1 | Status: SHIPPED | OUTPATIENT
Start: 2019-05-22 | End: 2019-05-22

## 2019-05-22 NOTE — PROGRESS NOTES
Subjective     Arline Saini is a 69 year old female who presents to clinic today for the following health issues:    HPI   Hypertension Follow-up      Do you check your blood pressure regularly outside of the clinic? Yes     Are you following a low salt diet? Yes    Are your blood pressures ever more than 140 on the top number (systolic) OR more   than 90 on the bottom number (diastolic), for example 140/90? No    Amount of exercise or physical activity: walking     Problems taking medications regularly: No    Medication side effects: none    Diet: regular (no restrictions)    Arline is a very pleasant female accompanied by her daughter who is interpreting. She saw me a month ago for evaluation of hypertension. She also recently completed an echocardiogram and saw cardiology. Due to some ankle swelling on Amlodipine she was switched to Nifedipine. She is tolerating this switch well.     Patient tells me that she  has been taking Losartan differently than prescribed. She has been taking Losartan 100 mg twice daily instead of once daily. Apparently she has been doing this for years. Creatinine in February was normal.      She also recently saw physical therapy for her back pain and has had significant improvement.     Patient Active Problem List   Diagnosis     Essential hypertension     Obesity (BMI 35.0-39.9) with comorbidity (H)     Thoracolumbar back pain     Past Surgical History:   Procedure Laterality Date     CHOLECYSTECTOMY  2018       Social History     Tobacco Use     Smoking status: Former Smoker     Last attempt to quit: 2018     Years since quittin.2     Smokeless tobacco: Never Used     Tobacco comment: Smoked a few cigarettes every day for 40 years.   Substance Use Topics     Alcohol use: No     Frequency: Never     Family History   Problem Relation Age of Onset     Cerebrovascular Disease Mother      Cerebrovascular Disease Sister 40     Unknown/Adopted Father            Reviewed and  "updated as needed this visit by Provider         Review of Systems   ROS COMP: Constitutional, HEENT, cardiovascular, pulmonary, gi and gu systems are negative, except as otherwise noted.      Objective    /73   Pulse 67   Temp 97.9  F (36.6  C) (Tympanic)   Wt 97.1 kg (214 lb)   SpO2 96%   BMI 36.17 kg/m    Body mass index is 36.17 kg/m .  Physical Exam   GENERAL: healthy, alert and no distress  RESP: lungs clear to auscultation - no rales, rhonchi or wheezes  CV: regular rate and rhythm, normal S1 S2, no S3 or S4, no murmur, click or rub, no peripheral edema and peripheral pulses strong  PSYCH: mentation appears normal, affect normal/bright    Diagnostic Test Results:  Labs reviewed in Epic        Assessment & Plan     1. Essential hypertension  Very well controlled right now. I am a little concerned about Arline taking Losartan 100 mg BID so I recommended that she take 50 mg BID instead (she prefers to continue taking it twice daily). She will continue Spironolactone 25 mg, Nifedipine 60 mg, and Bisoprolol 5 mg daily.   - losartan (COZAAR) 50 MG tablet; Take 1 tablet (50 mg) by mouth 2 times daily  Dispense: 180 tablet; Refill: 1    2. Obesity (BMI 35.0-39.9) with comorbidity (H)  Reviewed low salt diet, high fiber, and exercise.     3. Thoracolumbar back pain  COntinue physical therapy. Pain is improving.     4. Aortic root dilatation (H)  Seen on echo. Mild. Cardiology recommending 1 year follow up.        BMI:   Estimated body mass index is 36.17 kg/m  as calculated from the following:    Height as of 5/15/19: 1.638 m (5' 4.5\").    Weight as of this encounter: 97.1 kg (214 lb).   Weight management plan: Discussed healthy diet and exercise guidelines        Return in about 6 months (around 11/22/2019) for BP Recheck - PROVIDER, Medication Follow up.    Chel Martinez MD  [facility]      "

## 2019-05-23 PROBLEM — I77.810 AORTIC ROOT DILATATION (H): Status: ACTIVE | Noted: 2019-05-23

## 2019-06-25 DIAGNOSIS — I10 ESSENTIAL HYPERTENSION: ICD-10-CM

## 2019-06-26 RX ORDER — SPIRONOLACTONE 25 MG/1
TABLET ORAL
Qty: 30 TABLET | Refills: 5 | Status: SHIPPED | OUTPATIENT
Start: 2019-06-26 | End: 2020-01-02

## 2019-06-26 RX ORDER — BISOPROLOL FUMARATE 5 MG/1
TABLET, FILM COATED ORAL
Qty: 30 TABLET | Refills: 5 | Status: SHIPPED | OUTPATIENT
Start: 2019-06-26 | End: 2019-12-31

## 2019-06-26 NOTE — TELEPHONE ENCOUNTER
"Requested Prescriptions   Pending Prescriptions Disp Refills     spironolactone (ALDACTONE) 25 MG tablet [Pharmacy Med Name: Spironolactone Oral Tablet 25 MG] 30 tablet 0     Sig: TAKE ONE TABLET BY MOUTH ONE TIME DAILY       Diuretics (Including Combos) Protocol Passed - 6/25/2019  2:27 PM        Passed - Blood pressure under 140/90 in past 12 months     BP Readings from Last 3 Encounters:   05/22/19 110/73   05/15/19 124/88 04/24/19 136/88                 Passed - Recent (12 mo) or future (30 days) visit within the authorizing provider's specialty     Patient had office visit in the last 12 months or has a visit in the next 30 days with authorizing provider or within the authorizing provider's specialty.  See \"Patient Info\" tab in inbasket, or \"Choose Columns\" in Meds & Orders section of the refill encounter.              Passed - Medication is active on med list        Passed - Patient is age 18 or older        Passed - No active pregancy on record        Passed - Normal serum creatinine on file in past 12 months     Recent Labs   Lab Test 02/27/19  0914   CR 0.90              Passed - Normal serum potassium on file in past 12 months     Recent Labs   Lab Test 02/27/19  0914   POTASSIUM 3.9                    Passed - Normal serum sodium on file in past 12 months     Recent Labs   Lab Test 02/27/19  0914                 Passed - No positive pregnancy test in past 12 months        bisoprolol (ZEBETA) 5 MG tablet [Pharmacy Med Name: Bisoprolol Fumarate Oral Tablet 5 MG] 30 tablet 0     Sig: TAKE ONE TABLET BY MOUTH ONE TIME DAILY       Beta-Blockers Protocol Passed - 6/25/2019  2:27 PM        Passed - Blood pressure under 140/90 in past 12 months     BP Readings from Last 3 Encounters:   05/22/19 110/73   05/15/19 124/88   04/24/19 136/88                 Passed - Patient is age 6 or older        Passed - Recent (12 mo) or future (30 days) visit within the authorizing provider's specialty     Patient had " "office visit in the last 12 months or has a visit in the next 30 days with authorizing provider or within the authorizing provider's specialty.  See \"Patient Info\" tab in inbasket, or \"Choose Columns\" in Meds & Orders section of the refill encounter.              Passed - Medication is active on med list        Spironolactone 25 mg tablet  Last Written Prescription Date:  4/24/2019  Last Fill Quantity: 30,   # refills: 1  Last Office Visit: 5/22/2019  Future Office visit:       Routing refill request to provider for review/approval because:       Zebeta 5 mg tablet      Last Written Prescription Date:  4/24/2019  Last Fill Quantity: 30,   # refills: 1  Last Office Visit: 5/22/2019  Future Office visit:       Routing refill request to provider for review/approval because:      "

## 2019-08-29 PROBLEM — M54.50 THORACOLUMBAR BACK PAIN: Status: RESOLVED | Noted: 2019-04-29 | Resolved: 2019-08-29

## 2019-08-29 PROBLEM — M54.6 THORACOLUMBAR BACK PAIN: Status: RESOLVED | Noted: 2019-04-29 | Resolved: 2019-08-29

## 2019-12-31 DIAGNOSIS — I10 ESSENTIAL HYPERTENSION: ICD-10-CM

## 2019-12-31 NOTE — TELEPHONE ENCOUNTER
"  Routing refill request to provider for review/approval because:  Drug interaction warning        Requested Prescriptions   Pending Prescriptions Disp Refills     losartan (COZAAR) 50 MG tablet [Pharmacy Med Name: Losartan Potassium Oral Tablet 50 MG] 60 tablet 0     Sig: TAKE 1 TABLET BY MOUTH TWICE DAILY       Angiotensin-II Receptors Passed - 12/31/2019  2:06 PM        Passed - Last blood pressure under 140/90 in past 12 months     BP Readings from Last 3 Encounters:   05/22/19 110/73   05/15/19 124/88 04/24/19 136/88                 Passed - Recent (12 mo) or future (30 days) visit within the authorizing provider's specialty     Patient has had an office visit with the authorizing provider or a provider within the authorizing providers department within the previous 12 mos or has a future within next 30 days. See \"Patient Info\" tab in inbasket, or \"Choose Columns\" in Meds & Orders section of the refill encounter.              Passed - Medication is active on med list        Passed - Patient is age 18 or older        Passed - No active pregnancy on record        Passed - Normal serum creatinine on file in past 12 months     Recent Labs   Lab Test 02/27/19  0914   CR 0.90             Passed - Normal serum potassium on file in past 12 months     Recent Labs   Lab Test 02/27/19  0914   POTASSIUM 3.9                    Passed - No positive pregnancy test in past 12 months        spironolactone (ALDACTONE) 25 MG tablet [Pharmacy Med Name: Spironolactone Oral Tablet 25 MG] 30 tablet 4     Sig: TAKE ONE TABLET BY MOUTH ONE TIME DAILY       Diuretics (Including Combos) Protocol Passed - 12/31/2019  2:06 PM        Passed - Blood pressure under 140/90 in past 12 months     BP Readings from Last 3 Encounters:   05/22/19 110/73   05/15/19 124/88   04/24/19 136/88                 Passed - Recent (12 mo) or future (30 days) visit within the authorizing provider's specialty     Patient has had an office visit with the " "authorizing provider or a provider within the authorizing providers department within the previous 12 mos or has a future within next 30 days. See \"Patient Info\" tab in inbasket, or \"Choose Columns\" in Meds & Orders section of the refill encounter.              Passed - Medication is active on med list        Passed - Patient is age 18 or older        Passed - No active pregancy on record        Passed - Normal serum creatinine on file in past 12 months     Recent Labs   Lab Test 02/27/19  0914   CR 0.90              Passed - Normal serum potassium on file in past 12 months     Recent Labs   Lab Test 02/27/19  0914   POTASSIUM 3.9                    Passed - Normal serum sodium on file in past 12 months     Recent Labs   Lab Test 02/27/19  0914                 Passed - No positive pregnancy test in past 12 months        bisoprolol (ZEBETA) 5 MG tablet [Pharmacy Med Name: Bisoprolol Fumarate Oral Tablet 5 MG] 30 tablet 4     Sig: TAKE ONE TABLET BY MOUTH ONE TIME DAILY       Beta-Blockers Protocol Passed - 12/31/2019  2:06 PM        Passed - Blood pressure under 140/90 in past 12 months     BP Readings from Last 3 Encounters:   05/22/19 110/73   05/15/19 124/88   04/24/19 136/88                 Passed - Patient is age 6 or older        Passed - Recent (12 mo) or future (30 days) visit within the authorizing provider's specialty     Patient has had an office visit with the authorizing provider or a provider within the authorizing providers department within the previous 12 mos or has a future within next 30 days. See \"Patient Info\" tab in inbasket, or \"Choose Columns\" in Meds & Orders section of the refill encounter.              Passed - Medication is active on med list          "

## 2020-01-02 RX ORDER — SPIRONOLACTONE 25 MG/1
TABLET ORAL
Qty: 30 TABLET | Refills: 4 | Status: SHIPPED | OUTPATIENT
Start: 2020-01-02 | End: 2020-06-30

## 2020-01-02 RX ORDER — BISOPROLOL FUMARATE 5 MG/1
TABLET, FILM COATED ORAL
Qty: 30 TABLET | Refills: 4 | Status: SHIPPED | OUTPATIENT
Start: 2020-01-02 | End: 2020-06-30

## 2020-01-02 RX ORDER — LOSARTAN POTASSIUM 50 MG/1
TABLET ORAL
Qty: 60 TABLET | Refills: 4 | Status: SHIPPED | OUTPATIENT
Start: 2020-01-02 | End: 2020-06-30

## 2020-03-11 ENCOUNTER — HEALTH MAINTENANCE LETTER (OUTPATIENT)
Age: 70
End: 2020-03-11

## 2020-04-27 DIAGNOSIS — I10 ESSENTIAL HYPERTENSION: ICD-10-CM

## 2020-04-27 NOTE — TELEPHONE ENCOUNTER
Routing refill request to provider for review/approval because:  Labs not current:  RUFINA Coffman RN, BSN  [facility] [place]

## 2020-06-16 ENCOUNTER — NURSE TRIAGE (OUTPATIENT)
Dept: FAMILY MEDICINE | Facility: CLINIC | Age: 70
End: 2020-06-16

## 2020-06-16 NOTE — TELEPHONE ENCOUNTER
Pt sent a My Chart Appt request. Please see note below and triage. Thank you.  Britany Gonzalez,   Arline Saini  Patient Appointment Schedule Request Pool 13 hours ago (6:09 PM)          Appointment Request From: Arline Saini     With Provider: Chel Martinez MD [place] [place]     Preferred Date Range: 6/16/2020 - 6/26/2020     Preferred Times: Any Time     Reason for visit: Request an Appointment     Comments:  Swollen legs and random bruising

## 2020-06-16 NOTE — TELEPHONE ENCOUNTER
"Please see triage assessment and additional information below.     Do you recommend video visit or telephone visit? Daughter is okay with video visit with provider.     Call daughter back at [phone]  Okay to leave a detailed message? YES    Additional Information    Negative: Chest pain    Negative: Small area of swelling and followed an insect bite to the area    Negative: Followed a knee injury    Negative: Ankle or foot injury    Negative: Pregnant with leg swelling or edema    Negative: Sounds like a life-threatening emergency to the triager    Negative: Difficulty breathing at rest    Negative: Entire foot is cool or blue in comparison to other side    Negative: Swelling of face, arm or hands (Exception: slight puffiness of fingers during hot weather)    Negative: Pregnant > 20 weeks and sudden weight gain (i.e., > 2 lbs, 1 kg in one week)    Negative: Fever and red area (or area very tender to touch)    Negative: Patient sounds very sick or weak to the triager    Negative: SEVERE swelling (e.g., swelling extends above knee, entire leg is swollen, weeping fluid)    Negative: Thigh or calf pain and only 1 side and present > 1 hour    Negative: Thigh, calf, or ankle swelling in only one leg    Negative: Thigh, calf, or ankle swelling in both legs, but one side is definitely more swollen    Negative: Cast on leg or ankle and has increasing pain    Negative: Can't walk or can barely stand (new onset)    MODERATE swelling of both ankles (e.g., swelling extends up to the knees) AND new onset or worsening    Answer Assessment - Initial Assessment Questions  1. ONSET: \"When did the swelling start?\" (e.g., minutes, hours, days)      Couple of Weeks, daughter was informed yesterday. Daughter states there is bruising on the R. Leg below the knee near the ankle. The bruising started a couple of weeks ago.   2. LOCATION: \"What part of the leg is swollen?\"  \"Are both legs swollen or just one leg?\"      Bilateral legs " "and ankles bilaterally.   3. SEVERITY: \"How bad is the swelling?\" (e.g., localized; mild, moderate, severe)   - Localized - small area of swelling localized to one leg   - MILD pedal edema - swelling limited to foot and ankle, pitting edema < 1/4 inch (6 mm) deep, rest and elevation eliminate most or all swelling   - MODERATE edema - swelling of lower leg to knee, pitting edema > 1/4 inch (6 mm) deep, rest and elevation only partially reduce swelling   - SEVERE edema - swelling extends above knee, facial or hand swelling present       Moderate to severe swelling.   4. REDNESS: \"Does the swelling look red or infected?\"      No redness.   5. PAIN: \"Is the swelling painful to touch?\" If so, ask: \"How painful is it?\"   (Scale 1-10; mild, moderate or severe)      Pain is present moderate.  6. FEVER: \"Do you have a fever?\" If so, ask: \"What is it, how was it measured, and when did it start?\"       No fevers.   7. CAUSE: \"What do you think is causing the leg swelling?\"      n/a  8. MEDICAL HISTORY: \"Do you have a history of heart failure, kidney disease, liver failure, or cancer?\"      Per daughter patient has never been diagnosed, but daughter suspects possible kidney disease.   9. RECURRENT SYMPTOM: \"Have you had leg swelling before?\" If so, ask: \"When was the last time?\" \"What happened that time?\"      Had swelling in the past, but not as moderate and severe.   10. OTHER SYMPTOMS: \"Do you have any other symptoms?\" (e.g., chest pain, difficulty breathing)       None  11. PREGNANCY: \"Is there any chance you are pregnant?\" \"When was your last menstrual period?\"        N/A    Protocols used: LEG SWELLING AND EDEMA-A-OH    Milagros Coffman RN, BSN  [facility]    "

## 2020-06-30 ENCOUNTER — VIRTUAL VISIT (OUTPATIENT)
Dept: FAMILY MEDICINE | Facility: CLINIC | Age: 70
End: 2020-06-30
Payer: COMMERCIAL

## 2020-06-30 DIAGNOSIS — I10 ESSENTIAL HYPERTENSION: ICD-10-CM

## 2020-06-30 DIAGNOSIS — M79.89 LOCALIZED SWELLING OF LOWER EXTREMITY: Primary | ICD-10-CM

## 2020-06-30 DIAGNOSIS — I77.810 AORTIC ROOT DILATATION (H): ICD-10-CM

## 2020-06-30 DIAGNOSIS — Z13.1 SCREENING FOR DIABETES MELLITUS: ICD-10-CM

## 2020-06-30 DIAGNOSIS — K21.9 GASTROESOPHAGEAL REFLUX DISEASE, ESOPHAGITIS PRESENCE NOT SPECIFIED: ICD-10-CM

## 2020-06-30 PROCEDURE — 99214 OFFICE O/P EST MOD 30 MIN: CPT | Mod: 95 | Performed by: INTERNAL MEDICINE

## 2020-06-30 RX ORDER — SPIRONOLACTONE 25 MG/1
25 TABLET ORAL DAILY
Qty: 90 TABLET | Refills: 1 | Status: SHIPPED | OUTPATIENT
Start: 2020-06-30 | End: 2020-07-02

## 2020-06-30 RX ORDER — FUROSEMIDE 40 MG
40 TABLET ORAL DAILY
Qty: 30 TABLET | Refills: 0 | Status: SHIPPED | OUTPATIENT
Start: 2020-06-30 | End: 2020-07-02

## 2020-06-30 RX ORDER — LOSARTAN POTASSIUM 50 MG/1
50 TABLET ORAL 2 TIMES DAILY
Qty: 180 TABLET | Refills: 3 | Status: SHIPPED | OUTPATIENT
Start: 2020-06-30 | End: 2020-07-02

## 2020-06-30 RX ORDER — BISOPROLOL FUMARATE 5 MG/1
5 TABLET, FILM COATED ORAL DAILY
Qty: 90 TABLET | Refills: 3 | Status: SHIPPED | OUTPATIENT
Start: 2020-06-30 | End: 2020-07-02

## 2020-06-30 NOTE — PROGRESS NOTES
"Arline Saini is a 70 year old female who is being evaluated via a billable video visit.      The patient has been notified of following:     \"This video visit will be conducted via a call between you and your physician/provider. We have found that certain health care needs can be provided without the need for an in-person physical exam.  This service lets us provide the care you need with a video conversation.  If a prescription is necessary we can send it directly to your pharmacy.  If lab work is needed we can place an order for that and you can then stop by our lab to have the test done at a later time.    Video visits are billed at different rates depending on your insurance coverage.  Please reach out to your insurance provider with any questions.    If during the course of the call the physician/provider feels a video visit is not appropriate, you will not be charged for this service.\"    Patient has given verbal consent for Video visit? Yes  How would you like to obtain your AVS? Siobhan  Patient would like the video invitation sent by: Siobhan  Will anyone else be joining your video visit? No    Subjective     Arline Saini is a 70 year old female who presents today via video visit for the following health issues:    [phone]    HPI     Concern - leg  swelling     History is obtained from the daughter who interprets for her mother.   Legs have been swelling quite a lot for the past couple of months.  Daughter states that her mother's legs become purple in discoloration and they are painful. She can barely drink any water because it causes her legs to swell more. She is on her feet a lot and has not tried wearing compression stockings.     Denies shortness of breath, chest pain. Swelling comes and goes; it is symmetric.          Video Start Time: 9:35        Patient Active Problem List   Diagnosis     Essential hypertension     Obesity (BMI 35.0-39.9) with comorbidity (H)     Aortic root " dilatation (H)     Past Surgical History:   Procedure Laterality Date     CHOLECYSTECTOMY  2018       Social History     Tobacco Use     Smoking status: Former Smoker     Last attempt to quit: 2018     Years since quittin.3     Smokeless tobacco: Never Used     Tobacco comment: Smoked a few cigarettes every day for 40 years.   Substance Use Topics     Alcohol use: No     Frequency: Never     Family History   Problem Relation Age of Onset     Cerebrovascular Disease Mother      Cerebrovascular Disease Sister 40     Unknown/Adopted Father            Reviewed and updated as needed this visit by Provider         Review of Systems   Constitutional, HEENT, cardiovascular, pulmonary, gi and gu systems are negative, except as otherwise noted.      Objective             Physical Exam     GENERAL: Healthy, alert and no distress  EYES: Eyes grossly normal to inspection.  No discharge or erythema, or obvious scleral/conjunctival abnormalities.  RESP: No audible wheeze, cough, or visible cyanosis.  No visible retractions or increased work of breathing.    SKIN: Visible skin clear. No significant rash, abnormal pigmentation or lesions.  NEURO: Cranial nerves grossly intact.  Mentation and speech appropriate for age.  MSK: Symmetric swelling of both lower legs, some pitting noted, ecchymoses and varicosities noted  PSYCH: Mentation appears normal, affect normal/bright, judgement and insight intact, normal speech and appearance well-groomed.      Diagnostic Test Results:  Labs reviewed in Epic        Assessment & Plan     1. Localized swelling of lower extremity  I suspect that Caitlins leg swelling is partially due to her Nifedipine and venous insufficiency. She had an echo last year which showed normal systolic and diastolic function. I am recommending compression stockings and a diuretic. I am also going to discontinue nifedipine.   - furosemide (LASIX) 40 MG tablet; Take 1 tablet (40 mg) by mouth daily  Dispense: 30  tablet; Refill: 0  - order for DME; Compression stockings  Dispense: 1 each; Refill: 0  - N terminal pro BNP outpatient; Future    2. Essential hypertension  Discontinue nifedipine. Patient will check her BP at home every couple of days and notify me of any increase in BP. We have room to go up on her Spironolactone or Bisoprolol.   - Comprehensive metabolic panel; Future  - bisoprolol (ZEBETA) 5 MG tablet; Take 1 tablet (5 mg) by mouth daily  Dispense: 90 tablet; Refill: 3  - losartan (COZAAR) 50 MG tablet; Take 1 tablet (50 mg) by mouth 2 times daily  Dispense: 180 tablet; Refill: 3  - spironolactone (ALDACTONE) 25 MG tablet; Take 1 tablet (25 mg) by mouth daily  Dispense: 90 tablet; Refill: 1    3. Aortic root dilatation (H)  Per echo. Continue BP control.     4. Screening for diabetes mellitus  - Hemoglobin A1c; Future    5. Gastroesophageal reflux disease, esophagitis presence not specified  - omeprazole (PRILOSEC) 20 MG DR capsule; Take 1 capsule (20 mg) by mouth daily  Dispense: 90 capsule; Refill: 3       Return in about 2 weeks (around 7/14/2020) for Lab Work - Not fasting.    Chel Martinez MD  [facility]      Video-Visit Details    Type of service:  Video Visit    Video End Time:9:56    Originating Location (pt. Location): Home    [place]  [facility] [place] used for Video Visit: Doximity    Return in about 2 weeks (around 7/14/2020) for Lab Work - Not fasting.       Chel Martinez MD

## 2020-07-02 ENCOUNTER — TELEPHONE (OUTPATIENT)
Dept: FAMILY MEDICINE | Facility: CLINIC | Age: 70
End: 2020-07-02

## 2020-07-02 DIAGNOSIS — I10 ESSENTIAL HYPERTENSION: Primary | ICD-10-CM

## 2020-07-02 DIAGNOSIS — M79.89 SWELLING OF LOWER EXTREMITY: ICD-10-CM

## 2020-07-02 NOTE — TELEPHONE ENCOUNTER
Called patients daughter and informed her of MD note below.   Daughter verbalized understanding and agrees with plan. Patient is scheduled for BP check and lab appointment 4 weeks from now.       Routing to TC to advise as daughter would like the DME order that was ordered today and locally printed to be mailed to her for the compression stockings.     Milagros Coffman RN, BSN  [facility] [place]

## 2020-07-02 NOTE — PATIENT INSTRUCTIONS
Summary of our visit:    Discontinuing Nifedipine (due to concern that it is contributing to your leg swelling). I have sent you a script for a water pill called furosemide to take once daily. You should also start wearing compression stockings daily when able.     We will likely need to increase/change your other blood pressure medication, so please be checking your blood pressure regularly at home.     I would like you to come in for labs and a BP check with a nurse in about 4 weeks.

## 2020-07-02 NOTE — TELEPHONE ENCOUNTER
DME order was mailed to the pt (please see earlier telephone encounter from 7/2)  Britany Gonzalez,

## 2020-07-02 NOTE — TELEPHONE ENCOUNTER
Triage - please make sure Arline's daughter understands the summery of our visit (below). I would also like her scheduled for labs and a BP check in a couple of weeks.      Summary of our visit:    Discontinuing Nifedipine (due to concern that it is contributing to your leg swelling). I have sent you a script for a water pill called furosemide to take once daily. You should also start wearing compression stockings daily when able.     We will likely need to increase/change your other blood pressure medication, so please be checking your blood pressure regularly at home.     I would like you to come in for labs and a BP check with a nurse in about 4 weeks.

## 2020-07-21 ENCOUNTER — MYC MEDICAL ADVICE (OUTPATIENT)
Dept: FAMILY MEDICINE | Facility: CLINIC | Age: 70
End: 2020-07-21

## 2020-07-21 DIAGNOSIS — I10 ESSENTIAL HYPERTENSION: ICD-10-CM

## 2020-07-21 RX ORDER — SPIRONOLACTONE 25 MG/1
50 TABLET ORAL DAILY
Qty: 90 TABLET | Refills: 1
Start: 2020-07-21 | End: 2020-09-15

## 2020-07-21 NOTE — TELEPHONE ENCOUNTER
Please see [employer] message below and advise.   Yaa Akins RN   [facility] - Triage

## 2020-09-08 DIAGNOSIS — M79.89 LOCALIZED SWELLING OF LOWER EXTREMITY: ICD-10-CM

## 2020-09-09 NOTE — TELEPHONE ENCOUNTER
Routing refill request to provider for review/approval because:  Labs not current:  Cr, K+ and Na+  Failed BP protocol    Sally TAM RN  EP Triage

## 2020-09-10 RX ORDER — FUROSEMIDE 40 MG
TABLET ORAL
Qty: 30 TABLET | Refills: 0 | Status: SHIPPED | OUTPATIENT
Start: 2020-09-10 | End: 2021-07-06

## 2020-09-10 NOTE — TELEPHONE ENCOUNTER
Voicemail left with  services, please schedule lab appointment and bp check with Rn nurse only .       Isadora Fulton MA

## 2020-09-10 NOTE — TELEPHONE ENCOUNTER
Please call patient to schedule a lab appointment. In order to continue diuretic therapy she needs labs done. A nurse BP would also be ideal since we mad adjustments to her medications recently. I will refill 30 days of the furosemide.

## 2020-09-15 ENCOUNTER — ALLIED HEALTH/NURSE VISIT (OUTPATIENT)
Dept: NURSING | Facility: CLINIC | Age: 70
End: 2020-09-15
Payer: COMMERCIAL

## 2020-09-15 ENCOUNTER — TELEPHONE (OUTPATIENT)
Dept: FAMILY MEDICINE | Facility: CLINIC | Age: 70
End: 2020-09-15

## 2020-09-15 VITALS — SYSTOLIC BLOOD PRESSURE: 156 MMHG | DIASTOLIC BLOOD PRESSURE: 88 MMHG | HEART RATE: 56 BPM

## 2020-09-15 DIAGNOSIS — I10 ESSENTIAL HYPERTENSION: Primary | ICD-10-CM

## 2020-09-15 DIAGNOSIS — Z13.1 SCREENING FOR DIABETES MELLITUS: ICD-10-CM

## 2020-09-15 DIAGNOSIS — I10 ESSENTIAL HYPERTENSION: ICD-10-CM

## 2020-09-15 DIAGNOSIS — M79.89 SWELLING OF LOWER EXTREMITY: ICD-10-CM

## 2020-09-15 DIAGNOSIS — M79.89 LOCALIZED SWELLING OF LOWER EXTREMITY: ICD-10-CM

## 2020-09-15 LAB
ERYTHROCYTE [DISTWIDTH] IN BLOOD BY AUTOMATED COUNT: 14.3 % (ref 10–15)
HBA1C MFR BLD: 5.4 % (ref 0–5.6)
HCT VFR BLD AUTO: 42.6 % (ref 35–47)
HGB BLD-MCNC: 14.2 G/DL (ref 11.7–15.7)
MCH RBC QN AUTO: 29.9 PG (ref 26.5–33)
MCHC RBC AUTO-ENTMCNC: 33.3 G/DL (ref 31.5–36.5)
MCV RBC AUTO: 90 FL (ref 78–100)
NT-PROBNP SERPL-MCNC: 238 PG/ML (ref 0–125)
PLATELET # BLD AUTO: 192 10E9/L (ref 150–450)
RBC # BLD AUTO: 4.75 10E12/L (ref 3.8–5.2)
WBC # BLD AUTO: 5.1 10E9/L (ref 4–11)

## 2020-09-15 PROCEDURE — 80053 COMPREHEN METABOLIC PANEL: CPT | Performed by: INTERNAL MEDICINE

## 2020-09-15 PROCEDURE — 36415 COLL VENOUS BLD VENIPUNCTURE: CPT | Performed by: INTERNAL MEDICINE

## 2020-09-15 PROCEDURE — 85027 COMPLETE CBC AUTOMATED: CPT | Performed by: INTERNAL MEDICINE

## 2020-09-15 PROCEDURE — 83036 HEMOGLOBIN GLYCOSYLATED A1C: CPT | Performed by: INTERNAL MEDICINE

## 2020-09-15 PROCEDURE — 99207 ZZC NO CHARGE NURSE ONLY: CPT

## 2020-09-15 PROCEDURE — 82043 UR ALBUMIN QUANTITATIVE: CPT | Performed by: INTERNAL MEDICINE

## 2020-09-15 PROCEDURE — 83880 ASSAY OF NATRIURETIC PEPTIDE: CPT | Performed by: INTERNAL MEDICINE

## 2020-09-15 RX ORDER — SPIRONOLACTONE 50 MG/1
50 TABLET, FILM COATED ORAL DAILY
Qty: 90 TABLET | Refills: 0 | Status: SHIPPED | OUTPATIENT
Start: 2020-09-15 | End: 2020-12-08

## 2020-09-15 NOTE — NURSING NOTE
Arline Saini is a 70 year old year old patient who comes in today for a Blood Pressure check because of new medication.  Vital Signs as repeated by /88, Radial pulse 56 and regular  Patient is taking medication as prescribed - no, daughter reports that patient is only taking spironolactone 25 mg daily instead of 50 mg daily. States that it is because the patient forgets to take 2 of the 25 mg tablets of spironolactone.  Patient is tolerating medications well.  Patient is monitoring Blood Pressure at home.  Average readings if yes are 130/90  Current complaints: parathesia in feet sometimes  Disposition:  Telephone message sent to Dr. Martinez.  Jyoti Carlos RN

## 2020-09-15 NOTE — TELEPHONE ENCOUNTER
Thank you for the updates! I've sent in a new script for the 50 mg tablet of Spironolactone, instead of the two 25 mg tablets. Let's see if that is helpful in getting her to goal. If not then I will adjust further.    I see that she has labs from today pending so I will report on those when available.

## 2020-09-15 NOTE — NURSING NOTE
Patient's daughter acted as  because the patient scheduled last minute. Patient did not want  services contacted by phone. Instead patient immediately called her daughter to act as . Jyoti Carlos RN

## 2020-09-15 NOTE — TELEPHONE ENCOUNTER
Arline Saini is a 70 year old year old patient who comes in today for a Blood Pressure check because of new medication.    Vital Signs as repeated by /88, Radial pulse 56 and regular    Patient is taking medication as prescribed - no, daughter reports that patient is only taking spironolactone 25 mg daily instead of 50 mg daily. States that it is because the patient forgets to take 2 of the 25 mg tablets of spironolactone.    Patient is tolerating medications well.  Patient is monitoring Blood Pressure at home.  Average readings if yes are 130/90    Current complaints: parathesia in feet sometimes    Patient's daughter acted as  because the patient scheduled last minute. Patient did not want  services contacted by phone. Instead patient immediately called her daughter to act as .  Jyoti Carlos RN

## 2020-09-16 LAB
ALBUMIN SERPL-MCNC: 3.9 G/DL (ref 3.4–5)
ALP SERPL-CCNC: 71 U/L (ref 40–150)
ALT SERPL W P-5'-P-CCNC: 29 U/L (ref 0–50)
ANION GAP SERPL CALCULATED.3IONS-SCNC: 11 MMOL/L (ref 3–14)
AST SERPL W P-5'-P-CCNC: 24 U/L (ref 0–45)
BILIRUB SERPL-MCNC: 1.3 MG/DL (ref 0.2–1.3)
BUN SERPL-MCNC: 25 MG/DL (ref 7–30)
CALCIUM SERPL-MCNC: 9.3 MG/DL (ref 8.5–10.1)
CHLORIDE SERPL-SCNC: 110 MMOL/L (ref 94–109)
CO2 SERPL-SCNC: 21 MMOL/L (ref 20–32)
CREAT SERPL-MCNC: 1.01 MG/DL (ref 0.52–1.04)
CREAT UR-MCNC: 162 MG/DL
GFR SERPL CREATININE-BSD FRML MDRD: 57 ML/MIN/{1.73_M2}
GLUCOSE SERPL-MCNC: 98 MG/DL (ref 70–99)
MICROALBUMIN UR-MCNC: 7 MG/L
MICROALBUMIN/CREAT UR: 4.22 MG/G CR (ref 0–25)
POTASSIUM SERPL-SCNC: 4.6 MMOL/L (ref 3.4–5.3)
PROT SERPL-MCNC: 8.2 G/DL (ref 6.8–8.8)
SODIUM SERPL-SCNC: 142 MMOL/L (ref 133–144)

## 2020-12-08 ENCOUNTER — OFFICE VISIT (OUTPATIENT)
Dept: FAMILY MEDICINE | Facility: CLINIC | Age: 70
End: 2020-12-08
Payer: COMMERCIAL

## 2020-12-08 VITALS
WEIGHT: 230 LBS | HEART RATE: 70 BPM | HEIGHT: 64 IN | BODY MASS INDEX: 39.27 KG/M2 | OXYGEN SATURATION: 97 % | TEMPERATURE: 96.3 F | DIASTOLIC BLOOD PRESSURE: 102 MMHG | SYSTOLIC BLOOD PRESSURE: 150 MMHG

## 2020-12-08 DIAGNOSIS — I10 ESSENTIAL HYPERTENSION: ICD-10-CM

## 2020-12-08 DIAGNOSIS — K64.4 EXTERNAL HEMORRHOIDS: ICD-10-CM

## 2020-12-08 DIAGNOSIS — N30.00 ACUTE CYSTITIS WITHOUT HEMATURIA: Primary | ICD-10-CM

## 2020-12-08 LAB
ALBUMIN UR-MCNC: NEGATIVE MG/DL
APPEARANCE UR: CLEAR
BACTERIA #/AREA URNS HPF: ABNORMAL /HPF
BILIRUB UR QL STRIP: NEGATIVE
CAOX CRY #/AREA URNS HPF: ABNORMAL /HPF
COLOR UR AUTO: YELLOW
GLUCOSE UR STRIP-MCNC: NEGATIVE MG/DL
HGB UR QL STRIP: ABNORMAL
KETONES UR STRIP-MCNC: NEGATIVE MG/DL
LEUKOCYTE ESTERASE UR QL STRIP: NEGATIVE
MUCOUS THREADS #/AREA URNS LPF: PRESENT /LPF
NITRATE UR QL: NEGATIVE
NON-SQ EPI CELLS #/AREA URNS LPF: ABNORMAL /LPF
PH UR STRIP: 5.5 PH (ref 5–7)
RBC #/AREA URNS AUTO: ABNORMAL /HPF
SOURCE: ABNORMAL
SP GR UR STRIP: >1.03 (ref 1–1.03)
UROBILINOGEN UR STRIP-ACNC: 0.2 EU/DL (ref 0.2–1)
WBC #/AREA URNS AUTO: ABNORMAL /HPF

## 2020-12-08 PROCEDURE — 87086 URINE CULTURE/COLONY COUNT: CPT | Performed by: FAMILY MEDICINE

## 2020-12-08 PROCEDURE — 99214 OFFICE O/P EST MOD 30 MIN: CPT | Performed by: FAMILY MEDICINE

## 2020-12-08 PROCEDURE — 81001 URINALYSIS AUTO W/SCOPE: CPT | Performed by: FAMILY MEDICINE

## 2020-12-08 RX ORDER — SPIRONOLACTONE 50 MG/1
50 TABLET, FILM COATED ORAL DAILY
Qty: 90 TABLET | Refills: 0 | Status: SHIPPED | OUTPATIENT
Start: 2020-12-08 | End: 2021-06-17

## 2020-12-08 RX ORDER — CEPHALEXIN 500 MG/1
500 CAPSULE ORAL 2 TIMES DAILY
Qty: 14 CAPSULE | Refills: 0 | Status: SHIPPED | OUTPATIENT
Start: 2020-12-08 | End: 2020-12-15

## 2020-12-08 RX ORDER — COCOA BUTTER, PHENYLEPHRINE HYDROCHLORIDE 2211; 6.25 MG/1; MG/1
1 SUPPOSITORY RECTAL PRN
Qty: 12 SUPPOSITORY | Refills: 0 | Status: SHIPPED | OUTPATIENT
Start: 2020-12-08 | End: 2021-07-06

## 2020-12-08 RX ORDER — BISOPROLOL FUMARATE 10 MG/1
10 TABLET, FILM COATED ORAL DAILY
Qty: 30 TABLET | Refills: 0 | Status: SHIPPED | OUTPATIENT
Start: 2020-12-08 | End: 2021-01-05

## 2020-12-08 ASSESSMENT — MIFFLIN-ST. JEOR: SCORE: 1553.27

## 2020-12-08 NOTE — PROGRESS NOTES
"Subjective     Gerrybou No Patty Saini is a 70 year old female who presents to clinic today for the following health issues:    HPI         Genitourinary - Female  Onset/Duration: 3 weeks ago   Description:   Painful urination (Dysuria): no           Frequency: no  Blood in urine (Hematuria): no  Delay in urine (Hesitency): YES  Intensity: mild  Progression of Symptoms:  same  Accompanying Signs & Symptoms:  Fever/chills: no  Flank pain: no  Nausea and vomiting: no  Vaginal symptoms: none  Abdominal/Pelvic Pain: YES  History:   History of frequent UTI s: no  History of kidney stones: no  Sexually Active: no  Possibility of pregnancy: No  Precipitating or alleviating factors: None  Therapies tried and outcome:           Review of Systems   CONSTITUTIONAL: NEGATIVE for fever, chills, change in weight  INTEGUMENTARY/SKIN: NEGATIVE for worrisome rashes, moles or lesions  EYES: NEGATIVE for vision changes or irritation  ENT/MOUTH: NEGATIVE for ear, mouth and throat problems  RESP: NEGATIVE for significant cough or SOB  CV: NEGATIVE for chest pain, palpitations or peripheral edema  GI: NEGATIVE for nausea, abdominal pain, heartburn, or change in bowel habits  MUSCULOSKELETAL: NEGATIVE for significant arthralgias or myalgia  NEURO: NEGATIVE for weakness, dizziness or paresthesias  HEME: NEGATIVE for bleeding problems      Objective    BP (!) 150/102   Pulse 70   Temp 96.3  F (35.7  C) (Tympanic)   Ht 1.626 m (5' 4\")   Wt 104.3 kg (230 lb)   SpO2 97%   BMI 39.48 kg/m    Body mass index is 39.48 kg/m .  Physical Exam   GENERAL: healthy, alert and no distress  EYES: Eyes grossly normal to inspection, PERRL and conjunctivae and sclerae normal  HENT: normal cephalic/atraumatic and wearing face mask covering mouth and nose  MS: no gross musculoskeletal defects noted, no edema  SKIN: no suspicious lesions or rashes  NEURO: Normal strength and tone, mentation intact and speech normal  PSYCH: mentation appears normal, affect " "normal/bright    No results found for this or any previous visit (from the past 24 hour(s)).        Assessment & Plan   Problem List Items Addressed This Visit     Essential hypertension    Relevant Medications    spironolactone (ALDACTONE) 50 MG tablet    bisoprolol (ZEBETA) 10 MG tablet    External hemorrhoids    Relevant Medications    phenylephrine-cocoa butter (PREPARATION H) 0.25-88.44 % suppository      Other Visit Diagnoses     Acute cystitis without hematuria    -  Primary    Relevant Medications    cephALEXin (KEFLEX) 500 MG capsule         UTI: new  --Urinalysis with micro ordered; Ucx pended  --Rx Keflex    Hemorrhoids: acute on chronic     --Requesting suppositories; Rx sent    HTN: uncontrolled  --Continue Spironolactone and Losartan  --Increase Bisoprolol from 5 to 10 mg daily  --Encouraged low salt diet  --RTC in 3-4 weeks to re-check BP      BMI:   Estimated body mass index is 39.48 kg/m  as calculated from the following:    Height as of this encounter: 1.626 m (5' 4\").    Weight as of this encounter: 104.3 kg (230 lb).   Weight management plan: Discussed healthy diet and exercise guidelines      See Patient Instructions  Return in about 4 weeks (around 1/5/2021) for BP Recheck.    Andreea Hay, [facility]      "

## 2020-12-09 LAB
BACTERIA SPEC CULT: NORMAL
Lab: NORMAL
SPECIMEN SOURCE: NORMAL

## 2020-12-10 ENCOUNTER — TELEPHONE (OUTPATIENT)
Dept: FAMILY MEDICINE | Facility: CLINIC | Age: 70
End: 2020-12-10

## 2020-12-10 NOTE — TELEPHONE ENCOUNTER
Please help inform pt that her urine culture is negative for infection.  She can stop taking the antibiotic.  (Requires [place] )  Thanks!  --Dr. Hay

## 2020-12-10 NOTE — TELEPHONE ENCOUNTER
Patient daughter (CTC on file)was notified with information noted by provider and agreed with plan.  Daughter stated she does not need an  because she speaks English.  Daughter is not sure of patient's current symptoms and will check with patient and call clinic back if she continues to have symptoms.    HARRY BustilloN, RN  [employer] [facility]

## 2021-01-03 ENCOUNTER — HEALTH MAINTENANCE LETTER (OUTPATIENT)
Age: 71
End: 2021-01-03

## 2021-01-05 ENCOUNTER — TELEPHONE (OUTPATIENT)
Dept: OTHER | Facility: CLINIC | Age: 71
End: 2021-01-05

## 2021-01-05 ENCOUNTER — OFFICE VISIT (OUTPATIENT)
Dept: FAMILY MEDICINE | Facility: CLINIC | Age: 71
End: 2021-01-05
Payer: COMMERCIAL

## 2021-01-05 VITALS
TEMPERATURE: 97.3 F | WEIGHT: 230 LBS | BODY MASS INDEX: 38.32 KG/M2 | OXYGEN SATURATION: 98 % | SYSTOLIC BLOOD PRESSURE: 150 MMHG | HEIGHT: 65 IN | DIASTOLIC BLOOD PRESSURE: 110 MMHG | HEART RATE: 63 BPM

## 2021-01-05 DIAGNOSIS — Z13.1 SCREENING FOR DIABETES MELLITUS: ICD-10-CM

## 2021-01-05 DIAGNOSIS — I83.893 VARICOSE VEINS OF BOTH LEGS WITH EDEMA: ICD-10-CM

## 2021-01-05 DIAGNOSIS — I10 ESSENTIAL HYPERTENSION: Primary | ICD-10-CM

## 2021-01-05 LAB — HBA1C MFR BLD: 5.7 % (ref 0–5.6)

## 2021-01-05 PROCEDURE — 36415 COLL VENOUS BLD VENIPUNCTURE: CPT | Performed by: FAMILY MEDICINE

## 2021-01-05 PROCEDURE — 99214 OFFICE O/P EST MOD 30 MIN: CPT | Performed by: FAMILY MEDICINE

## 2021-01-05 PROCEDURE — 83036 HEMOGLOBIN GLYCOSYLATED A1C: CPT | Performed by: FAMILY MEDICINE

## 2021-01-05 RX ORDER — BISOPROLOL FUMARATE 10 MG/1
10 TABLET, FILM COATED ORAL DAILY
Qty: 30 TABLET | Refills: 1 | Status: SHIPPED | OUTPATIENT
Start: 2021-01-05 | End: 2021-06-17

## 2021-01-05 ASSESSMENT — MIFFLIN-ST. JEOR: SCORE: 1562.27

## 2021-01-05 NOTE — TELEPHONE ENCOUNTER
"Received referral via \"workqueue\", per  guidelines referral forwarded to [facility] for varicose veins.    Kati Suarez, BSN, RN    "

## 2021-01-05 NOTE — PROGRESS NOTES
Assessment & Plan   Problem List Items Addressed This Visit     Essential hypertension - Primary    Relevant Medications    bisoprolol (ZEBETA) 10 MG tablet      Other Visit Diagnoses     Screening for diabetes mellitus        Relevant Orders    Hemoglobin A1c (Completed)    Varicose veins of both legs with edema        Relevant Orders    VASCULAR MEDICINE REFERRAL         Tells me BP at home has been good but cannot tell me any numbers and did not bring in BP log today.  She would like to keep same BP meds/doses for now but promises to send BP and Pulse levels via [employer]; daughter (speak English) will help take care of this. If BP at home not at goal, then will need to adjust her meds.  Would also like to get HgbA1c checked today to0 make sure she does not have DM; DM runs in her family.  Would also like [facility] for her painful vericose veins.    Review of external notes as documented above     30 minutes spent on the date of the encounter doing chart review, history and exam, documentation and further activities as noted above         See Patient Instructions    Return in about 6 weeks (around 2/16/2021) for BP Recheck.    Andreea Hay, [facility]     Arline Saini is a 70 year old female who presents to clinic today for the following health issues     HPI       Hypertension Follow-up      Do you check your blood pressure regularly outside of the clinic? Yes     Are you following a low salt diet? Yes    Are your blood pressures ever more than 140 on the top number (systolic) OR more   than 90 on the bottom number (diastolic), for example 140/90? Yes 125/96       How many servings of fruits and vegetables do you eat daily?  0-1    On average, how many sweetened beverages do you drink each day (Examples: soda, juice, sweet tea, etc.  Do NOT count diet or artificially sweetened beverages)?   0    How many days per week do you exercise enough  "to make your heart beat faster? 3 or less    How many minutes a day do you exercise enough to make your heart beat faster? 9 or less    How many days per week do you miss taking your medication? 0        Review of Systems   CONSTITUTIONAL: NEGATIVE for fever, chills, change in weight  INTEGUMENTARY/SKIN: NEGATIVE for worrisome rashes, moles or lesions  EYES: NEGATIVE for vision changes or irritation  ENT/MOUTH: NEGATIVE for ear, mouth and throat problems  RESP: NEGATIVE for significant cough or SOB  CV: NEGATIVE for chest pain, palpitations or peripheral edema  GI: NEGATIVE for nausea, abdominal pain, heartburn, or change in bowel habits  : NEGATIVE for frequency, dysuria, or hematuria  MUSCULOSKELETAL: NEGATIVE for significant arthralgias or myalgia  NEURO: NEGATIVE for weakness, dizziness or paresthesias  HEME: NEGATIVE for bleeding problems      Objective    BP (!) 150/110   Pulse 63   Temp 97.3  F (36.3  C) (Tympanic)   Ht 1.64 m (5' 4.57\")   Wt 104.3 kg (230 lb)   SpO2 98%   BMI 38.79 kg/m    Body mass index is 38.79 kg/m .  Physical Exam   GENERAL: healthy, alert and no distress  EYES: Eyes grossly normal to inspection, PERRL and conjunctivae and sclerae normal  HENT: wearing face mask  RESP: lungs clear to auscultation - no rales, rhonchi or wheezes  CV: regular rate and rhythm, normal S1 S2, no S3 or S4, no murmur, click or rub, no peripheral edema and peripheral pulses strong  MS: no gross musculoskeletal defects noted; mild edema in LE's  SKIN: +vericose veins present                "

## 2021-01-11 NOTE — TELEPHONE ENCOUNTER
Called through  services to speak with patient.  Daughter states that patient does not want an .  I stated that on the day of the appt we would contact  services and have patient decline .  Patient scheduled for new consult on 01/26/21.

## 2021-01-26 ENCOUNTER — OFFICE VISIT (OUTPATIENT)
Dept: VASCULAR SURGERY | Facility: CLINIC | Age: 71
End: 2021-01-26
Attending: FAMILY MEDICINE
Payer: COMMERCIAL

## 2021-01-26 DIAGNOSIS — I78.1 ASYMPTOMATIC SPIDER VEINS OF BOTH LOWER EXTREMITIES: Primary | ICD-10-CM

## 2021-01-26 DIAGNOSIS — I83.893 VARICOSE VEINS OF BOTH LEGS WITH EDEMA: ICD-10-CM

## 2021-01-26 PROCEDURE — 99203 OFFICE O/P NEW LOW 30 MIN: CPT | Performed by: SURGERY

## 2021-01-26 ASSESSMENT — ENCOUNTER SYMPTOMS
RESPIRATORY NEGATIVE: 1
BACK PAIN: 1
CONSTIPATION: 1
EYES NEGATIVE: 1
ALLERGIC/IMMUNOLOGIC NEGATIVE: 1
ENDOCRINE NEGATIVE: 1
HEADACHES: 1
WEIGHT GAIN: 1
BRUISES/BLEEDS EASILY: 1

## 2021-01-26 NOTE — PROGRESS NOTES
VEIN SOLUTIONS CONSULT    Impression:   1.  Bilateral lower extremity varicosities with swelling.    2.  Bilateral lower extremity spider telangiectasias.    3.  Obesity.    4.  Hypertension    Plan:   I had a discussion with the patient and her daughter (who acted as the  for this encounter per her request) regarding all the above.  She certainly has pitting edema of both ankles and some visualized varicosities.  I discussed venous pathophysiology.  She will be given a prescription for thigh-high and knee-high compression stockings of 20 to 30 mmHg pressure.  I will arrange for bilateral lower extremity venous competency studies through this office.  I will meet with him again following that exam to discuss treatment options.    HPI:   Arline Saini is a 70-year-old Russian female who presents at this time with complaints of bilateral lower extremity edema, aching, and pain with a few visualized varicosities.  She had complained to her primary care physician about her lower extremity swelling and has been subsequently referred here for venous consultation.  She was accompanied by her daughter who acted as her .      She has been complaining of significant leg edema for the past several months.  This has been treated previously with diuretics with little success.  She has no prior history of venous intervention.  She has never worn compression stockings.      CURRENT MEDICATIONS       bisoprolol (ZEBETA) 10 MG tablet, Take 1 tablet (10 mg) by mouth daily       furosemide (LASIX) 40 MG tablet, TAKE ONE TABLET BY MOUTH ONE TIME DAILY        losartan (COZAAR) 50 MG tablet, Take 1 tablet (50 mg) by mouth 2 times daily       omeprazole (PRILOSEC) 20 MG DR capsule, Take 1 capsule (20 mg) by mouth daily       order for DME, Compression stockings       phenylephrine-cocoa butter (PREPARATION H) 0.25-88.44 % suppository, Place 1 suppository rectally as needed for hemorrhoids or itching        spironolactone (ALDACTONE) 50 MG tablet, Take 1 tablet (50 mg) by mouth daily    No current facility-administered medications on file prior to visit.         PAST MEDICAL HISTORY  Past Medical History:   Diagnosis Date     Essential hypertension 2019     Obesity (BMI 35.0-39.9) with comorbidity (H) 2019         PAST SURGICAL HISTORY:  Past Surgical History:   Procedure Laterality Date     CHOLECYSTECTOMY         ALLERGIES   No Known Allergies    FAMILY HISTORY  Family History   Problem Relation Age of Onset     Cerebrovascular Disease Mother      Cerebrovascular Disease Sister 40     Unknown/Adopted Father        SOCIAL HISTORY  Social History     Tobacco Use     Smoking status: Former Smoker     Quit date: 2018     Years since quittin.9     Smokeless tobacco: Never Used     Tobacco comment: Smoked a few cigarettes every day for 40 years.   Substance Use Topics     Alcohol use: No     Frequency: Never     Drug use: No       ROS:   Review of Systems   Constitution: Positive for weight gain.   HENT: Positive for hearing loss.    Eyes: Negative.    Cardiovascular: Positive for chest pain and leg swelling.   Respiratory: Negative.    Endocrine: Negative.    Hematologic/Lymphatic: Bruises/bleeds easily.   Skin: Negative.    Musculoskeletal: Positive for back pain and joint pain.   Gastrointestinal: Positive for constipation.   Genitourinary: Negative.    Neurological: Positive for headaches.   Allergic/Immunologic: Negative.          EXAM:  There were no vitals taken for this visit.  Physical Exam  Constitutional:       Appearance: Normal appearance. She is obese.   HENT:      Head: Normocephalic and atraumatic.   Eyes:      General: No scleral icterus.     Extraocular Movements: Extraocular movements intact.      Pupils: Pupils are equal, round, and reactive to light.   Cardiovascular:      Pulses:           Radial pulses are 2+ on the right side and 2+ on the left side.        Dorsalis pedis  pulses are 2+ on the right side and 2+ on the left side.      Comments: Scattered bilateral lower extremity spider telangiectasias.  Few very small varicosities.  Musculoskeletal: Normal range of motion.      Right lower le+ Edema present.      Left lower le+ Edema present.   Skin:     General: Skin is warm and dry.   Neurological:      General: No focal deficit present.      Mental Status: She is oriented to person, place, and time. Mental status is at baseline.   Psychiatric:         Mood and Affect: Mood normal.         Behavior: Behavior normal.         Thought Content: Thought content normal.         Judgment: Judgment normal.       Labs:  LIPID RESULTS:  Lab Results   Component Value Date    CHOL 156 2019    HDL 55 2019    LDL 90 2019    TRIG 54 2019       CBC RESULTS:  Lab Results   Component Value Date    WBC 5.1 09/15/2020    RBC 4.75 09/15/2020    HGB 14.2 09/15/2020    HCT 42.6 09/15/2020    MCV 90 09/15/2020    MCH 29.9 09/15/2020    MCHC 33.3 09/15/2020    RDW 14.3 09/15/2020     09/15/2020       BMP RESULTS:  Lab Results   Component Value Date     09/15/2020    POTASSIUM 4.6 09/15/2020    CHLORIDE 110 (H) 09/15/2020    CO2 21 09/15/2020    ANIONGAP 11 09/15/2020    GLC 98 09/15/2020    BUN 25 09/15/2020    CR 1.01 09/15/2020    GFRESTIMATED 57 (L) 09/15/2020    GFRESTBLACK 66 09/15/2020    YOANDY 9.3 09/15/2020        A1C RESULTS:  Lab Results   Component Value Date    A1C 5.7 (H) 2021         Imaging:  No results found for this or any previous visit (from the past 24 hour(s)).      Total length of this encounter was 30 minutes.        Capo Coleman MD

## 2021-01-26 NOTE — LETTER
1/26/2021         RE: Arline Saini  [id number] [address]  [place] [address]        Dear Colleague,    Thank you for referring your patient, Arline Saini, to the [facility] [place]. Please see a copy of my visit note below.    VEIN SOLUTIONS CONSULT    Impression:   1.  Bilateral lower extremity varicosities with swelling.    2.  Bilateral lower extremity spider telangiectasias.    3.  Obesity.    4.  Hypertension    Plan:   I had a discussion with the patient and her daughter (who acted as the  for this encounter per her request) regarding all the above.  She certainly has pitting edema of both ankles and some visualized varicosities.  I discussed venous pathophysiology.  She will be given a prescription for thigh-high and knee-high compression stockings of 20 to 30 mmHg pressure.  I will arrange for bilateral lower extremity venous competency studies through this office.  I will meet with him again following that exam to discuss treatment options.    HPI:   Arline Saini is a 70-year-old [place] female who presents at this time with complaints of bilateral lower extremity edema, aching, and pain with a few visualized varicosities.  She had complained to her primary care physician about her lower extremity swelling and has been subsequently referred here for venous consultation.  She was accompanied by her daughter who acted as her .      She has been complaining of significant leg edema for the past several months.  This has been treated previously with diuretics with little success.  She has no prior history of venous intervention.  She has never worn compression stockings.      CURRENT MEDICATIONS       bisoprolol (ZEBETA) 10 MG tablet, Take 1 tablet (10 mg) by mouth daily       furosemide (LASIX) 40 MG tablet, TAKE ONE TABLET BY MOUTH ONE TIME DAILY        losartan (COZAAR) 50 MG tablet, Take 1 tablet (50 mg) by mouth 2 times daily       omeprazole  (PRILOSEC) 20 MG DR capsule, Take 1 capsule (20 mg) by mouth daily       order for DME, Compression stockings       phenylephrine-cocoa butter (PREPARATION H) 0.25-88.44 % suppository, Place 1 suppository rectally as needed for hemorrhoids or itching       spironolactone (ALDACTONE) 50 MG tablet, Take 1 tablet (50 mg) by mouth daily    No current facility-administered medications on file prior to visit.         PAST MEDICAL HISTORY  Past Medical History:   Diagnosis Date     Essential hypertension 2019     Obesity (BMI 35.0-39.9) with comorbidity (H) 2019         PAST SURGICAL HISTORY:  Past Surgical History:   Procedure Laterality Date     CHOLECYSTECTOMY         ALLERGIES   No Known Allergies    FAMILY HISTORY  Family History   Problem Relation Age of Onset     Cerebrovascular Disease Mother      Cerebrovascular Disease Sister 40     Unknown/Adopted Father        SOCIAL HISTORY  Social History     Tobacco Use     Smoking status: Former Smoker     Quit date: 2018     Years since quittin.9     Smokeless tobacco: Never Used     Tobacco comment: Smoked a few cigarettes every day for 40 years.   Substance Use Topics     Alcohol use: No     Frequency: Never     Drug use: No       ROS:   Review of Systems   Constitution: Positive for weight gain.   HENT: Positive for hearing loss.    Eyes: Negative.    Cardiovascular: Positive for chest pain and leg swelling.   Respiratory: Negative.    Endocrine: Negative.    Hematologic/Lymphatic: Bruises/bleeds easily.   Skin: Negative.    Musculoskeletal: Positive for back pain and joint pain.   Gastrointestinal: Positive for constipation.   Genitourinary: Negative.    Neurological: Positive for headaches.   Allergic/Immunologic: Negative.          EXAM:  There were no vitals taken for this visit.  Physical Exam  Constitutional:       Appearance: Normal appearance. She is obese.   HENT:      Head: Normocephalic and atraumatic.   Eyes:      General: No scleral  icterus.     Extraocular Movements: Extraocular movements intact.      Pupils: Pupils are equal, round, and reactive to light.   Cardiovascular:      Pulses:           Radial pulses are 2+ on the right side and 2+ on the left side.        Dorsalis pedis pulses are 2+ on the right side and 2+ on the left side.      Comments: Scattered bilateral lower extremity spider telangiectasias.  Few very small varicosities.  Musculoskeletal: Normal range of motion.      Right lower le+ Edema present.      Left lower le+ Edema present.   Skin:     General: Skin is warm and dry.   Neurological:      General: No focal deficit present.      Mental Status: She is oriented to person, place, and time. Mental status is at baseline.   Psychiatric:         Mood and Affect: Mood normal.         Behavior: Behavior normal.         Thought Content: Thought content normal.         Judgment: Judgment normal.       Labs:  LIPID RESULTS:  Lab Results   Component Value Date    CHOL 156 2019    HDL 55 2019    LDL 90 2019    TRIG 54 2019       CBC RESULTS:  Lab Results   Component Value Date    WBC 5.1 09/15/2020    RBC 4.75 09/15/2020    HGB 14.2 09/15/2020    HCT 42.6 09/15/2020    MCV 90 09/15/2020    MCH 29.9 09/15/2020    MCHC 33.3 09/15/2020    RDW 14.3 09/15/2020     09/15/2020       BMP RESULTS:  Lab Results   Component Value Date     09/15/2020    POTASSIUM 4.6 09/15/2020    CHLORIDE 110 (H) 09/15/2020    CO2 21 09/15/2020    ANIONGAP 11 09/15/2020    GLC 98 09/15/2020    BUN 25 09/15/2020    CR 1.01 09/15/2020    GFRESTIMATED 57 (L) 09/15/2020    GFRESTBLACK 66 09/15/2020    YOANDY 9.3 09/15/2020        A1C RESULTS:  Lab Results   Component Value Date    A1C 5.7 (H) 2021         Imaging:  No results found for this or any previous visit (from the past 24 hour(s)).      Total length of this encounter was 30 minutes.        Capo Coleman MD          Again, thank you for allowing me to  participate in the care of your patient.        Sincerely,        Capo Coleman MD

## 2021-02-04 ENCOUNTER — ANCILLARY PROCEDURE (OUTPATIENT)
Dept: ULTRASOUND IMAGING | Facility: CLINIC | Age: 71
End: 2021-02-04
Attending: SURGERY
Payer: COMMERCIAL

## 2021-02-04 DIAGNOSIS — I83.893 VARICOSE VEINS OF BOTH LEGS WITH EDEMA: ICD-10-CM

## 2021-02-04 PROCEDURE — 93970 EXTREMITY STUDY: CPT | Performed by: SURGERY

## 2021-02-09 ENCOUNTER — OFFICE VISIT (OUTPATIENT)
Dept: VASCULAR SURGERY | Facility: CLINIC | Age: 71
End: 2021-02-09
Attending: SURGERY
Payer: COMMERCIAL

## 2021-02-09 DIAGNOSIS — I78.1 ASYMPTOMATIC SPIDER VEINS OF BOTH LOWER EXTREMITIES: Primary | ICD-10-CM

## 2021-02-09 PROCEDURE — 99214 OFFICE O/P EST MOD 30 MIN: CPT | Performed by: SURGERY

## 2021-02-09 NOTE — PROGRESS NOTES
Arline Saini is a 70-year-old [place]er female evaluated by me in this office on 2021 for bilateral lower extremity spider telangiectasias, scattered varicosities, and complaints of lower extremity edema.  Please see my full consultation from that office visit.  She was given a prescription for compression stockings of 20 to 30 mmHg pressure.  She has not yet purchased those stockings.  She underwent bilateral lower extremity venous competency studies on 2021.  She presents today to discuss the results of those studies.  She speaks limited English and therefore we utilized a telephone [place] Island[place]  today.    At her prior visit on 2021 she had easily palpable pedal pulses bilaterally.  She had no ankle edema.  There has been no change in her physical examination since then.    Imaging:  Alessia Guillaume on 2021  4:19 PM    Name:  Arline Saini                                                      Patient ID: [id number]  Date: 2021                                                           : 1950  Sex: female                                                                             Examined by: RADHA  Age:  70 year old                                                                     Reading MD: ISAAC     INDICATION:  Bilateral lower extremity edema.     EXAM TYPE  BILATERAL LOWER EXTREMITY VENOUS DUPLEX FOR VENOUS INSUFFICIENCY  TECHNICAL SUMMARY     A duplex ultrasound study using color flow was performed to evaluate the bilateral lower extremity veins for valvular incompetence with the patient in a steep reversed trendelenberg.      RIGHT:     The deep veins demonstrate phasic flow, compress, and respond to augmentations.  There is no reflux or DVT.       The GSV demonstrates phasic flow, compresses, and responds to augmentations from the saphenofemoral junction to the ankle with no evidence of reflux or thrombus. The greater saphenous vein measures 7.2 mm at the  saphenofemoral junction, 4.9 mm in the proximal thigh, and 2.7 mm at the knee.     The AASV is competent (1.7 mm) draining into the saphenofemoral junction.      The Giacomini vein is competent (1.7 mm) communicating with the small saphenous vein at the knee level.      The SSV demonstrates phasic flow, compresses, and responds to augmentations from the popliteal space to the ankle.  No reflux or thrombus is seen.  The saphenopopliteal junction is absent.      Perforators: There is an incompetent  vein (3.8 mm) at 19 cm above the knee crease in the posterolateral upper thigh that communicates with varicose veins that course down the thigh to the posterior calf.      LEFT:     The deep veins demonstrate phasic flow, compress, and respond to augmentations.  There is no reflux or DVT.       The GSV demonstrates phasic flow, compresses, and responds to augmentations from the saphenofemoral junction to the ankle with no evidence of thrombus. The greater saphenous vein measures 6.1 mm at the saphenofemoral junction, 5.6 mm in the proximal thigh, and 3.3 mm at the knee. The GSV is incompetent in the distal thigh and again in the distal calf with the greatest reflux time of 2574 milliseconds.      The AASV is competent (2.6 mm) draining into the saphenofemoral junction.      The Giacomini vein is competent (.9 mm) communicating with the small saphenous vein at the knee level.      The SSV demonstrates phasic flow, compresses, and responds to augmentations from the popliteal space to the ankle.  No  thrombus is seen. The saphenopopliteal junction is absent. The SSV is incompetent in the proximal calf with a reflux time of 957 milliseconds.       Perforators: There is no evidence of incompetent  veins at any level.     FINAL SUMMARY:  1.         No evidence of bilateral lower extremity deep vein reflux or thrombus.  2.         Right incompetent  vein.  3.         Right varicose vein  incompetence.  4.         Left greater saphenous vein incompetence.  5..        Left proximal small saphenous vein incompetence.  6.         The time of incompetence is greater than 500 milliseconds in length.            ASSESSMENT:  1.  No right leg venous pathology.    2.  Minimal incompetence of the left greater saphenous vein only in the distal thigh and at the knee.  That vein is small in caliber and it is competent above and below that point.  This is not clinically significant.    3.  Scattered bilateral lower extremity spider telangiectasias.    RECOMMENDATION:  Through the telephone Citizen of [place]  I reviewed all the above with Arline.  I showed her her venous competency studies and explained that no venous intervention is indicated.  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  At today's visit she offers more complaints of pain in both of her legs.  She notes pain with prolonged standing and worsening bilateral lower extremity pain towards the end of the day.  The symptoms are potentially suggestive of spinal stenosis as she has easily palpable pedal pulses bilaterally.  She may wish to go through her primary care physician for a neurologic referral to rule out spinal stenosis.  She may wear her knee-high compression stockings for comfort.  She is not interested in cosmetic sclerotherapy for spider veins.  Venous follow-up can therefore be with us on an as-needed basis.    Total length of this encounter was 30 minutes.    Adolfo Coleman MD

## 2021-02-09 NOTE — LETTER
2021         RE: Arline Saini  [id number]  [facility] [address]        Dear Colleague,    Thank you for referring your patient, Arline Saini, to the [facility] [place]. Please see a copy of my visit note below.    Arline Saini is a 70-year-old Citizen of the [place] female evaluated by me in this office on 2021 for bilateral lower extremity spider telangiectasias, scattered varicosities, and complaints of lower extremity edema.  Please see my full consultation from that office visit.  She was given a prescription for compression stockings of 20 to 30 mmHg pressure.  She has not yet purchased those stockings.  She underwent bilateral lower extremity venous competency studies on 2021.  She presents today to discuss the results of those studies.  She speaks limited English and therefore we utilized a telephone Citizen of the [place]  today.    At her prior visit on 2021 she had easily palpable pedal pulses bilaterally.  She had no ankle edema.  There has been no change in her physical examination since then.    Imaging:  Alessia Guillaume on 2021  4:19 PM    Name:  Arline Saini                                                      Patient ID: [id number]  Date: 2021                                                           : 1950  Sex: female                                                                             Examined by: RADHA  Age:  70 year old                                                                     Reading MD: ISAAC     INDICATION:  Bilateral lower extremity edema.     EXAM TYPE  BILATERAL LOWER EXTREMITY VENOUS DUPLEX FOR VENOUS INSUFFICIENCY  TECHNICAL SUMMARY     A duplex ultrasound study using color flow was performed to evaluate the bilateral lower extremity veins for valvular incompetence with the patient in a steep reversed trendelenberg.      RIGHT:     The deep veins demonstrate phasic flow, compress, and respond to  augmentations.  There is no reflux or DVT.       The GSV demonstrates phasic flow, compresses, and responds to augmentations from the saphenofemoral junction to the ankle with no evidence of reflux or thrombus. The greater saphenous vein measures 7.2 mm at the saphenofemoral junction, 4.9 mm in the proximal thigh, and 2.7 mm at the knee.     The AASV is competent (1.7 mm) draining into the saphenofemoral junction.      The Giacomini vein is competent (1.7 mm) communicating with the small saphenous vein at the knee level.      The SSV demonstrates phasic flow, compresses, and responds to augmentations from the popliteal space to the ankle.  No reflux or thrombus is seen.  The saphenopopliteal junction is absent.      Perforators: There is an incompetent  vein (3.8 mm) at 19 cm above the knee crease in the posterolateral upper thigh that communicates with varicose veins that course down the thigh to the posterior calf.      LEFT:     The deep veins demonstrate phasic flow, compress, and respond to augmentations.  There is no reflux or DVT.       The GSV demonstrates phasic flow, compresses, and responds to augmentations from the saphenofemoral junction to the ankle with no evidence of thrombus. The greater saphenous vein measures 6.1 mm at the saphenofemoral junction, 5.6 mm in the proximal thigh, and 3.3 mm at the knee. The GSV is incompetent in the distal thigh and again in the distal calf with the greatest reflux time of 2574 milliseconds.      The AASV is competent (2.6 mm) draining into the saphenofemoral junction.      The Giacomini vein is competent (.9 mm) communicating with the small saphenous vein at the knee level.      The SSV demonstrates phasic flow, compresses, and responds to augmentations from the popliteal space to the ankle.  No  thrombus is seen. The saphenopopliteal junction is absent. The SSV is incompetent in the proximal calf with a reflux time of 957 milliseconds.       Perforators:  There is no evidence of incompetent  veins at any level.     FINAL SUMMARY:  1.         No evidence of bilateral lower extremity deep vein reflux or thrombus.  2.         Right incompetent  vein.  3.         Right varicose vein incompetence.  4.         Left greater saphenous vein incompetence.  5..        Left proximal small saphenous vein incompetence.  6.         The time of incompetence is greater than 500 milliseconds in length.            ASSESSMENT:  1.  No right leg venous pathology.    2.  Minimal incompetence of the left greater saphenous vein only in the distal thigh and at the knee.  That vein is small in caliber and it is competent above and below that point.  This is not clinically significant.    3.  Scattered bilateral lower extremity spider telangiectasias.    RECOMMENDATION:  Through the telephone [place]  I reviewed all the above with Arline.  I showed her her venous competency studies and explained that no venous intervention is indicated.  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  At today's visit she offers more complaints of pain in both of her legs.  She notes pain with prolonged standing and worsening bilateral lower extremity pain towards the end of the day.  The symptoms are potentially suggestive of spinal stenosis as she has easily palpable pedal pulses bilaterally.  She may wish to go through her primary care physician for a neurologic referral to rule out spinal stenosis.  She may wear her knee-high compression stockings for comfort.  She is not interested in cosmetic sclerotherapy for spider veins.  Venous follow-up can therefore be with us on an as-needed basis.    Total length of this encounter was 30 minutes.    Adolfo Coleman MD      Again, thank you for allowing me to participate in the care of your patient.        Sincerely,        Capo Coleman MD

## 2021-04-25 ENCOUNTER — HEALTH MAINTENANCE LETTER (OUTPATIENT)
Age: 71
End: 2021-04-25

## 2021-06-04 DIAGNOSIS — K21.9 GASTROESOPHAGEAL REFLUX DISEASE, UNSPECIFIED WHETHER ESOPHAGITIS PRESENT: ICD-10-CM

## 2021-06-04 DIAGNOSIS — I10 ESSENTIAL HYPERTENSION: ICD-10-CM

## 2021-06-07 RX ORDER — LOSARTAN POTASSIUM 50 MG/1
TABLET ORAL
Qty: 180 TABLET | Refills: 0 | Status: SHIPPED | OUTPATIENT
Start: 2021-06-07 | End: 2021-07-06

## 2021-06-07 NOTE — TELEPHONE ENCOUNTER
Patient needs a BP check. Her last visit showed 150/110 though patient reported normal levels at home. She should have labs done as well. I will place future orders; recommending virtual visit after BP check and labs are completed.

## 2021-06-07 NOTE — TELEPHONE ENCOUNTER
Failed protocol.  please route to  team if patient needs an appointment     Effie GARCIARN BSN  [facility]  [phone]

## 2021-06-10 DIAGNOSIS — I10 ESSENTIAL HYPERTENSION: ICD-10-CM

## 2021-06-10 RX ORDER — BISOPROLOL FUMARATE 5 MG/1
TABLET, FILM COATED ORAL
Qty: 30 TABLET | Refills: 0 | Status: SHIPPED | OUTPATIENT
Start: 2021-06-10 | End: 2021-06-17

## 2021-06-10 NOTE — TELEPHONE ENCOUNTER
BP check for tomorrow sounds great. I would like to get labs done as well. I will place orders. Can we get her scheduled for a lab appointment as well? I Just sent in a 30 day refill.

## 2021-06-10 NOTE — TELEPHONE ENCOUNTER
Talked to pts daughter will schedule virtual appt and BP check. CTC is on file please call daughter with update in the process [phone].  Lacey Marshall

## 2021-06-10 NOTE — TELEPHONE ENCOUNTER
Huddled with Jyoti DYKES pt was given refill for three days from [facility]. Pt will be here tomorrow for BP check RN and lab only. Please advise if not appropriate and virtual appt set for July with Harper Marshall

## 2021-06-11 ENCOUNTER — ALLIED HEALTH/NURSE VISIT (OUTPATIENT)
Dept: NURSING | Facility: CLINIC | Age: 71
End: 2021-06-11
Payer: COMMERCIAL

## 2021-06-11 VITALS — DIASTOLIC BLOOD PRESSURE: 96 MMHG | HEART RATE: 58 BPM | SYSTOLIC BLOOD PRESSURE: 174 MMHG

## 2021-06-11 DIAGNOSIS — I10 ESSENTIAL HYPERTENSION: ICD-10-CM

## 2021-06-11 DIAGNOSIS — Z01.30 BLOOD PRESSURE CHECK: ICD-10-CM

## 2021-06-11 DIAGNOSIS — I10 ESSENTIAL HYPERTENSION: Primary | ICD-10-CM

## 2021-06-11 LAB
ANION GAP SERPL CALCULATED.3IONS-SCNC: 5 MMOL/L (ref 3–14)
BUN SERPL-MCNC: 24 MG/DL (ref 7–30)
CALCIUM SERPL-MCNC: 8.7 MG/DL (ref 8.5–10.1)
CHLORIDE SERPL-SCNC: 111 MMOL/L (ref 94–109)
CHOLEST SERPL-MCNC: 185 MG/DL
CO2 SERPL-SCNC: 24 MMOL/L (ref 20–32)
CREAT SERPL-MCNC: 0.98 MG/DL (ref 0.52–1.04)
ERYTHROCYTE [DISTWIDTH] IN BLOOD BY AUTOMATED COUNT: 14 % (ref 10–15)
GFR SERPL CREATININE-BSD FRML MDRD: 59 ML/MIN/{1.73_M2}
GLUCOSE SERPL-MCNC: 95 MG/DL (ref 70–99)
HCT VFR BLD AUTO: 38.1 % (ref 35–47)
HDLC SERPL-MCNC: 55 MG/DL
HGB BLD-MCNC: 13 G/DL (ref 11.7–15.7)
LDLC SERPL CALC-MCNC: 100 MG/DL
MCH RBC QN AUTO: 30.2 PG (ref 26.5–33)
MCHC RBC AUTO-ENTMCNC: 34.1 G/DL (ref 31.5–36.5)
MCV RBC AUTO: 88 FL (ref 78–100)
NONHDLC SERPL-MCNC: 130 MG/DL
PLATELET # BLD AUTO: 182 10E9/L (ref 150–450)
POTASSIUM SERPL-SCNC: 4 MMOL/L (ref 3.4–5.3)
RBC # BLD AUTO: 4.31 10E12/L (ref 3.8–5.2)
SODIUM SERPL-SCNC: 140 MMOL/L (ref 133–144)
TRIGL SERPL-MCNC: 150 MG/DL
WBC # BLD AUTO: 5.8 10E9/L (ref 4–11)

## 2021-06-11 PROCEDURE — 80048 BASIC METABOLIC PNL TOTAL CA: CPT | Performed by: INTERNAL MEDICINE

## 2021-06-11 PROCEDURE — 80061 LIPID PANEL: CPT | Performed by: INTERNAL MEDICINE

## 2021-06-11 PROCEDURE — 85027 COMPLETE CBC AUTOMATED: CPT | Performed by: INTERNAL MEDICINE

## 2021-06-11 PROCEDURE — 36415 COLL VENOUS BLD VENIPUNCTURE: CPT | Performed by: INTERNAL MEDICINE

## 2021-06-11 PROCEDURE — 99207 PR NO CHARGE NURSE ONLY: CPT

## 2021-06-11 RX ORDER — BISOPROLOL FUMARATE 10 MG/1
10 TABLET, FILM COATED ORAL DAILY
Qty: 30 TABLET | Refills: 0 | Status: SHIPPED | OUTPATIENT
Start: 2021-06-11 | End: 2021-07-06

## 2021-06-11 RX ORDER — SPIRONOLACTONE 50 MG/1
50 TABLET, FILM COATED ORAL DAILY
Qty: 30 TABLET | Refills: 0 | Status: SHIPPED | OUTPATIENT
Start: 2021-06-11 | End: 2021-06-17

## 2021-06-11 NOTE — Clinical Note
Arline Saini is a 71 year old year old patient who comes in today for a Blood Pressure check because of ongoing blood pressure monitoring. Daughter adamantly refused Micronesian .  Vital Signs as repeated by /90, 174/96 Patient is taking medication as prescribed Patient is tolerating medications well. Patient is monitoring Blood Pressure at home.  Average readings if yes are 140-150/150/100, heart rate 65-90 Current complaints: headaches, parathesia and numbness hands, feet and legs bilaterally Disposition:  huddled with provider - Dr. Valencia. Patient has not been taking spironolactone? At first daughter said that she was taking it and then confirmed with patient has not been taking it. Received drug interaction warning for hyperkalemia.  Patient scheduled for follow with Dr. Valencia on 6/16.  Chart copied to Dr. Erik Carlos RN

## 2021-06-11 NOTE — NURSING NOTE
Arline Saini is a 71 year old year old patient who comes in today for a Blood Pressure check because of ongoing blood pressure monitoring. Daughter adamantly refused Malian .   Vital Signs as repeated by /90, 174/96  Patient is taking medication as prescribed  Patient is tolerating medications well.  Patient is monitoring Blood Pressure at home.  Average readings if yes are 140-150/150/100, heart rate 65-90  Current complaints: headaches, parathesia and numbness hands, feet and legs bilaterally  Disposition:  huddled with provider - Dr. Valencia. Patient has not been taking spironolactone? At first daughter said that she was taking it and then confirmed with patient has not been taking it.  Received drug interaction warning for hyperkalemia.   Patient scheduled for follow with Dr. Valencia on 6/16.   Chart copied to Dr. Erik Carlos RN

## 2021-06-14 NOTE — TELEPHONE ENCOUNTER
Patient is scheduled with Dr. Valencia on 06/17/21 at 4:40pm. Originally was 06/16/21 but daughter wanted to reschedule.    Nataly BILLINGS CMA

## 2021-06-17 ENCOUNTER — OFFICE VISIT (OUTPATIENT)
Dept: FAMILY MEDICINE | Facility: CLINIC | Age: 71
End: 2021-06-17
Payer: COMMERCIAL

## 2021-06-17 VITALS
OXYGEN SATURATION: 97 % | HEART RATE: 59 BPM | BODY MASS INDEX: 39.3 KG/M2 | WEIGHT: 233 LBS | DIASTOLIC BLOOD PRESSURE: 100 MMHG | TEMPERATURE: 97.5 F | SYSTOLIC BLOOD PRESSURE: 150 MMHG

## 2021-06-17 DIAGNOSIS — I10 ESSENTIAL HYPERTENSION: ICD-10-CM

## 2021-06-17 DIAGNOSIS — N18.30 STAGE 3 CHRONIC KIDNEY DISEASE, UNSPECIFIED WHETHER STAGE 3A OR 3B CKD (H): ICD-10-CM

## 2021-06-17 PROCEDURE — 99214 OFFICE O/P EST MOD 30 MIN: CPT | Performed by: FAMILY MEDICINE

## 2021-06-17 RX ORDER — BISOPROLOL FUMARATE 10 MG/1
10 TABLET, FILM COATED ORAL DAILY
Qty: 90 TABLET | Refills: 1 | Status: SHIPPED | OUTPATIENT
Start: 2021-06-17 | End: 2021-07-06

## 2021-06-17 RX ORDER — SPIRONOLACTONE 100 MG/1
100 TABLET, FILM COATED ORAL DAILY
Qty: 90 TABLET | Refills: 1 | Status: SHIPPED | OUTPATIENT
Start: 2021-06-17 | End: 2021-07-06

## 2021-06-17 ASSESSMENT — PAIN SCALES - GENERAL: PAINLEVEL: NO PAIN (0)

## 2021-06-17 NOTE — PROGRESS NOTES
"    Assessment & Plan     Essential hypertension  Has not been improving, will have her to increase the dose of spironolactone to 100mg, will recheck in 6 months   - spironolactone (ALDACTONE) 100 MG tablet; Take 1 tablet (100 mg) by mouth daily  - bisoprolol (ZEBETA) 10 MG tablet; Take 1 tablet (10 mg) by mouth daily    Stage 3 chronic kidney disease, unspecified whether stage 3a or 3b CKD  Stable, keep monitoring         34 minutes spent on the date of the encounter doing chart review, history and exam, documentation and further activities per the note       BMI:   Estimated body mass index is 39.3 kg/m  as calculated from the following:    Height as of 1/5/21: 1.64 m (5' 4.57\").    Weight as of this encounter: 105.7 kg (233 lb).   Weight management plan: Discussed healthy diet and exercise guidelines    FUTURE APPOINTMENTS:       - Follow-up visit in 6 months for med check and lab      No follow-ups on file.    Lyndon Valencia MD  [facility] ANAHY Nunn is a 71 year old who presents for the following health issues     HPI     Hypertension Follow-up      Do you check your blood pressure regularly outside of the clinic? Yes     Are you following a low salt diet? No    Are your blood pressures ever more than 140 on the top number (systolic) OR more   than 90 on the bottom number (diastolic), for example 140/90? Yes 145/90,180/120       How many servings of fruits and vegetables do you eat daily?  0-1    On average, how many sweetened beverages do you drink each day (Examples: soda, juice, sweet tea, etc.  Do NOT count diet or artificially sweetened beverages)?   Yes     How many days per week do you exercise enough to make your heart beat faster? 7    How many minutes a day do you exercise enough to make your heart beat faster? 20 - 29    How many days per week do you miss taking your medication? 0        Review of Systems   Constitutional, HEENT, cardiovascular, pulmonary, gi and " gu systems are negative, except as otherwise noted.      Objective    BP (!) 150/100   Pulse 59   Temp 97.5  F (36.4  C) (Tympanic)   Wt 105.7 kg (233 lb)   SpO2 97%   BMI 39.30 kg/m    Body mass index is 39.3 kg/m .  Physical Exam   GENERAL: healthy, alert and no distress  EYES: Eyes grossly normal to inspection, PERRL and conjunctivae and sclerae normal  HENT: ear canals and TM's normal, nose and mouth without ulcers or lesions  NECK: no adenopathy, no asymmetry, masses, or scars and thyroid normal to palpation  RESP: lungs clear to auscultation - no rales, rhonchi or wheezes  CV: regular rate and rhythm, normal S1 S2, no S3 or S4, no murmur, click or rub, no peripheral edema and peripheral pulses strong  ABDOMEN: soft, nontender, no hepatosplenomegaly, no masses and bowel sounds normal  MS: no gross musculoskeletal defects noted, no edema

## 2021-07-01 ENCOUNTER — TELEPHONE (OUTPATIENT)
Dept: FAMILY MEDICINE | Facility: CLINIC | Age: 71
End: 2021-07-01

## 2021-07-01 DIAGNOSIS — I10 ESSENTIAL HYPERTENSION: ICD-10-CM

## 2021-07-01 RX ORDER — SPIRONOLACTONE 100 MG/1
100 TABLET, FILM COATED ORAL DAILY
Qty: 90 TABLET | Refills: 1 | Status: CANCELLED | OUTPATIENT
Start: 2021-07-01

## 2021-07-01 NOTE — TELEPHONE ENCOUNTER
Unclear which pharmacy pt would like Aldactone sent to.     Called an left a detailed message with Pt daughter -CTC on file- to call back and let us know which pharmacy they prefer.     Lisa Nieto RN

## 2021-07-02 NOTE — TELEPHONE ENCOUNTER
Refused. Spoke with the patient's daughter. Patient's current pharmacy is [facility]. Jyoti Carlos RN

## 2021-07-06 ENCOUNTER — OFFICE VISIT (OUTPATIENT)
Dept: FAMILY MEDICINE | Facility: CLINIC | Age: 71
End: 2021-07-06
Payer: COMMERCIAL

## 2021-07-06 DIAGNOSIS — E66.01 MORBID OBESITY (H): ICD-10-CM

## 2021-07-06 DIAGNOSIS — I10 ESSENTIAL HYPERTENSION: Primary | ICD-10-CM

## 2021-07-06 DIAGNOSIS — K21.9 GASTROESOPHAGEAL REFLUX DISEASE, UNSPECIFIED WHETHER ESOPHAGITIS PRESENT: ICD-10-CM

## 2021-07-06 DIAGNOSIS — M79.89 LOCALIZED SWELLING OF LOWER EXTREMITY: ICD-10-CM

## 2021-07-06 DIAGNOSIS — N18.31 STAGE 3A CHRONIC KIDNEY DISEASE (H): ICD-10-CM

## 2021-07-06 PROCEDURE — 99214 OFFICE O/P EST MOD 30 MIN: CPT | Mod: 95 | Performed by: INTERNAL MEDICINE

## 2021-07-06 RX ORDER — BISOPROLOL FUMARATE 10 MG/1
10 TABLET, FILM COATED ORAL DAILY
Qty: 90 TABLET | Refills: 1 | Status: SHIPPED | OUTPATIENT
Start: 2021-07-06 | End: 2022-01-10

## 2021-07-06 RX ORDER — SPIRONOLACTONE 100 MG/1
100 TABLET, FILM COATED ORAL DAILY
Qty: 90 TABLET | Refills: 1 | Status: SHIPPED | OUTPATIENT
Start: 2021-07-06 | End: 2022-01-10

## 2021-07-06 RX ORDER — FUROSEMIDE 40 MG
40 TABLET ORAL DAILY
Qty: 90 TABLET | Refills: 1 | Status: SHIPPED | OUTPATIENT
Start: 2021-07-06 | End: 2022-01-10

## 2021-07-06 RX ORDER — LOSARTAN POTASSIUM 100 MG/1
100 TABLET ORAL DAILY
Qty: 90 TABLET | Refills: 1 | Status: SHIPPED | OUTPATIENT
Start: 2021-07-06 | End: 2022-01-10

## 2021-07-06 NOTE — PROGRESS NOTES
Arline is a 71 year old who is being evaluated via a billable phone visit.      How would you like to obtain your AVS? MyChart    Assessment & Plan     Essential hypertension  Blood pressures have been poorly controlled but it is tough to know if Arline is actually taking her medications as prescribed or not. Her daughter is frustrated that her pills are all being filled on different days of the month and cannot be with her mother every day to make sure she is taking her medications. I suggested setting up a pill box once per week so she will try this. I have sent renewed scripts to the pharmacy to have these all filled on schedule. Also, I offered to have someone from our community paramedic program come to the house but because of the language barrier the daughter does not feel that this would be helpful. She states that she does not like interpreters because she has seen many things get lost in translation in the past.   - spironolactone (ALDACTONE) 100 MG tablet; Take 1 tablet (100 mg) by mouth daily  - bisoprolol (ZEBETA) 10 MG tablet; Take 1 tablet (10 mg) by mouth daily  - losartan (COZAAR) 100 MG tablet; Take 1 tablet (100 mg) by mouth daily    Obesity (BMI 35.0-39.9) with comorbidity (H)  Stable.     Stage 3a chronic kidney disease  Labs checked last month; no significant change.     Localized swelling of lower extremity  - furosemide (LASIX) 40 MG tablet; Take 1 tablet (40 mg) by mouth daily    Gastroesophageal reflux disease, unspecified whether esophagitis present  - omeprazole (PRILOSEC) 20 MG DR capsule; Take 1 capsule (20 mg) by mouth daily        Return in about 4 weeks (around 8/3/2021) for BP Recheck - PROVIDER.    Chle Martinez MD  [facility] ANAHYCARMINE Nunn is a 71 year old who presents for the following health issues  accompanied by her daughter:    HPI     Medication Followup of medication check on all meds     Taking Medication as prescribed: yes    Side  Effects:      Medication Helping Symptoms:  not applicable     Arline is a Marshallese-speaking female with hypertension. Her daughter is interpreting today and is reporting a lot of difficulty with getting her mother's medications. She also doesn't know if her mother is actually checking her blood pressures at home, although she states that she is.     Her hypertension; has been poorly controlled. Saw my colleague last month who increased her Spironolactone to 100 mg but when the daughter reads me her pill bottles it looks like she is still taking 50 mg daily. Losartan is supposed to be 50 mg twice daily but daughter doubts that she is taking this twice daily.     Daughter is thinking about having her mother move back to [place] since she is struggling to take care of her here.       Review of Systems   Constitutional, HEENT, cardiovascular, pulmonary, gi and gu systems are negative, except as otherwise noted.      Objective           Vitals:  No vitals were obtained today due to virtual visit.    Physical Exam   GENERAL: Healthy, alert and no distress  RESP: No audible wheeze, cough, or visible cyanosis.                  Phone-Visit Details    Duration: 14 minutes

## 2021-10-10 ENCOUNTER — HEALTH MAINTENANCE LETTER (OUTPATIENT)
Age: 71
End: 2021-10-10

## 2022-02-03 ENCOUNTER — OFFICE VISIT (OUTPATIENT)
Dept: FAMILY MEDICINE | Facility: CLINIC | Age: 72
End: 2022-02-03
Payer: COMMERCIAL

## 2022-02-03 VITALS
HEART RATE: 82 BPM | BODY MASS INDEX: 39.27 KG/M2 | RESPIRATION RATE: 14 BRPM | TEMPERATURE: 97.4 F | WEIGHT: 230 LBS | DIASTOLIC BLOOD PRESSURE: 84 MMHG | HEIGHT: 64 IN | OXYGEN SATURATION: 98 % | SYSTOLIC BLOOD PRESSURE: 136 MMHG

## 2022-02-03 DIAGNOSIS — Z12.11 COLON CANCER SCREENING: ICD-10-CM

## 2022-02-03 DIAGNOSIS — N18.31 STAGE 3A CHRONIC KIDNEY DISEASE (H): ICD-10-CM

## 2022-02-03 DIAGNOSIS — Z12.31 ENCOUNTER FOR SCREENING MAMMOGRAM FOR BREAST CANCER: ICD-10-CM

## 2022-02-03 DIAGNOSIS — Z00.00 ENCOUNTER FOR ANNUAL PHYSICAL EXAM: ICD-10-CM

## 2022-02-03 DIAGNOSIS — K21.00 GASTROESOPHAGEAL REFLUX DISEASE WITH ESOPHAGITIS, UNSPECIFIED WHETHER HEMORRHAGE: ICD-10-CM

## 2022-02-03 DIAGNOSIS — I77.810 AORTIC ROOT DILATATION (H): ICD-10-CM

## 2022-02-03 DIAGNOSIS — Z00.00 ENCOUNTER FOR MEDICARE ANNUAL WELLNESS EXAM: ICD-10-CM

## 2022-02-03 DIAGNOSIS — Z23 HIGH PRIORITY FOR 2019-NCOV VACCINE: ICD-10-CM

## 2022-02-03 DIAGNOSIS — I10 ESSENTIAL HYPERTENSION: Primary | ICD-10-CM

## 2022-02-03 DIAGNOSIS — Z23 NEED FOR PROPHYLACTIC VACCINATION AND INOCULATION AGAINST INFLUENZA: ICD-10-CM

## 2022-02-03 DIAGNOSIS — E66.01 MORBID OBESITY (H): ICD-10-CM

## 2022-02-03 LAB — HGB BLD-MCNC: 14.6 G/DL (ref 11.7–15.7)

## 2022-02-03 PROCEDURE — 36415 COLL VENOUS BLD VENIPUNCTURE: CPT | Performed by: FAMILY MEDICINE

## 2022-02-03 PROCEDURE — 0051A COVID-19,PF,PFIZER (12+ YRS): CPT | Performed by: FAMILY MEDICINE

## 2022-02-03 PROCEDURE — 82043 UR ALBUMIN QUANTITATIVE: CPT | Performed by: FAMILY MEDICINE

## 2022-02-03 PROCEDURE — 90662 IIV NO PRSV INCREASED AG IM: CPT | Performed by: FAMILY MEDICINE

## 2022-02-03 PROCEDURE — 85018 HEMOGLOBIN: CPT | Performed by: FAMILY MEDICINE

## 2022-02-03 PROCEDURE — 91305 COVID-19,PF,PFIZER (12+ YRS): CPT | Performed by: FAMILY MEDICINE

## 2022-02-03 PROCEDURE — 80061 LIPID PANEL: CPT | Performed by: FAMILY MEDICINE

## 2022-02-03 PROCEDURE — 80053 COMPREHEN METABOLIC PANEL: CPT | Performed by: FAMILY MEDICINE

## 2022-02-03 PROCEDURE — 99214 OFFICE O/P EST MOD 30 MIN: CPT | Mod: 25 | Performed by: FAMILY MEDICINE

## 2022-02-03 PROCEDURE — 99397 PER PM REEVAL EST PAT 65+ YR: CPT | Mod: 25 | Performed by: FAMILY MEDICINE

## 2022-02-03 PROCEDURE — G0008 ADMIN INFLUENZA VIRUS VAC: HCPCS | Performed by: FAMILY MEDICINE

## 2022-02-03 RX ORDER — BISOPROLOL FUMARATE 10 MG/1
10 TABLET, FILM COATED ORAL DAILY
Qty: 90 TABLET | Refills: 3 | Status: SHIPPED | OUTPATIENT
Start: 2022-02-03 | End: 2023-01-11

## 2022-02-03 RX ORDER — LOSARTAN POTASSIUM 100 MG/1
100 TABLET ORAL DAILY
Qty: 90 TABLET | Refills: 3 | Status: SHIPPED | OUTPATIENT
Start: 2022-02-03 | End: 2023-01-12

## 2022-02-03 ASSESSMENT — ACTIVITIES OF DAILY LIVING (ADL): CURRENT_FUNCTION: NO ASSISTANCE NEEDED

## 2022-02-03 ASSESSMENT — MIFFLIN-ST. JEOR: SCORE: 1551.02

## 2022-02-03 NOTE — PATIENT INSTRUCTIONS
Patient Education   Personalized Prevention Plan  You are due for the preventive services outlined below.  Your care team is available to assist you in scheduling these services.  If you have already completed any of these items, please share that information with your care team to update in your medical record.  Health Maintenance Due   Topic Date Due     Osteoporosis Screening  Never done     ANNUAL REVIEW OF HM ORDERS  Never done     Mammogram  Never done     COVID-19 Vaccine (1) Never done     Colorectal Cancer Screening  Never done     Diptheria Tetanus Pertussis (DTAP/TDAP/TD) Vaccine (1 - Tdap) Never done     Zoster (Shingles) Vaccine (1 of 2) Never done     LUNG CANCER SCREENING  Never done     Pneumococcal Vaccine (1 of 1 - PPSV23) Never done     Flu Vaccine (1) Never done     Kidney Microalbumin Urine Test  09/15/2021     Basic Metabolic Panel  12/11/2021     PHQ-2  01/01/2022     Your Health Risk Assessment indicates you feel you are not in good health    A healthy lifestyle helps keep the body fit and the mind alert. It helps protect you from disease, helps you fight disease, and helps prevent chronic disease (disease that doesn't go away) from getting worse. This is important as you get older and begin to notice twinges in muscles and joints and a decline in the strength and stamina you once took for granted. A healthy lifestyle includes good healthcare, good nutrition, weight control, recreation, and regular exercise. Avoid harmful substances and do what you can to keep safe. Another part of a healthy lifestyle is stay mentally active and socially involved.    Good healthcare     Have a wellness visit every year.     If you have new symptoms, let us know right away. Don't wait until the next checkup.     Take medicines exactly as prescribed and keep your medicines in a safe place. Tell us if your medicine causes problems.   Healthy diet and weight control     Eat 3 or 4 small, nutritious, low-fat,  high-fiber meals a day. Include a variety of fruits, vegetables, and whole-grain foods.     Make sure you get enough calcium in your diet. Calcium, vitamin D, and exercise help prevent osteoporosis (bone thinning).     If you live alone, try eating with others when you can. That way you get a good meal and have company while you eat it.     Try to keep a healthy weight. If you eat more calories than your body uses for energy, it will be stored as fat and you will gain weight.     Recreation   Recreation is not limited to sports and team events. It includes any activity that provides relaxation, interest, enjoyment, and exercise. Recreation provides an outlet for physical, mental, and social energy. It can give a sense of worth and achievement. It can help you stay healthy.    Mental Exercise and Social Involvement  Mental and emotional health is as important as physical health. Keep in touch with friends and family. Stay as active as possible. Continue to learn and challenge yourself.   Things you can do to stay mentally active are:    Learn something new, like a foreign language or musical instrument.     Play SCRABBLE or do crossword puzzles. If you cannot find people to play these games with you at home, you can play them with others on your computer through the Internet.     Join a games club--anything from card games to chess or checkers or lawn bowling.     Start a new hobby.     Go back to school.     Volunteer.     Read.   Keep up with world events.    Exercise for a Healthier Heart  You may wonder how you can improve the health of your heart. If you re thinking about exercise, you re on the right track. You don t need to become an athlete. But you do need a certain amount of brisk exercise to help strengthen your heart. If you have been diagnosed with a heart condition, your healthcare provider may advise exercise to help stabilize your condition. To help make exercise a habit, choose safe, fun activities.       Exercise with a friend. When activity is fun, you're more likely to stick with it.   Before you start  Check with your healthcare provider before starting an exercise program. This is especially important if you have not been active for a while. It's also important if you have a long-term (chronic) health problem such as heart disease, diabetes, or obesity. Or if you are at high risk for having these problems.   Why exercise?  Exercising regularly offers many healthy rewards. It can help you do all of the following:     Improve your blood cholesterol level to help prevent further heart trouble    Lower your blood pressure to help prevent a stroke or heart attack    Control diabetes, or reduce your risk of getting this disease    Improve your heart and lung function    Reach and stay at a healthy weight    Make your muscles stronger so you can stay active    Prevent falls and fractures by slowing the loss of bone mass (osteoporosis)    Manage stress better    Reduce your blood pressure    Improve your sense of self and your body image  Exercise tips      Ease into your routine. Set small goals. Then build on them. If you are not sure what your activity level should be, talk with your healthcare provider first before starting an exercise routine.    Exercise on most days. Aim for a total of 150 minutes (2 hours and 30 minutes) or more of moderate-intensity aerobic activity each week. Or 75 minutes (1 hour and 15 minutes) or more of vigorous-intensity aerobic activity each week. Or try for a combination of both. Moderate activity means that you breathe heavier and your heart rate increases but you can still talk. Think about doing 40 minutes of moderate exercise, 3 to 4 times a week. For best results, activity should last for about 40 minutes to lower blood pressure and cholesterol. It's OK to work up to the 40-minute period over time. Examples of moderate-intensity activity are walking 1 mile in 15 minutes. Or  doing 30 to 45 minutes of yard work.    Step up your daily activity level.  Along with your exercise program, try being more active the whole day. Walk instead of drive. Or park further away so that you take more steps each day. Do more household tasks or yard work. You may not be able to meet the advised mount of physical activity. But doing some moderate- or vigorous-intensity aerobic activity can help reduce your risk for heart disease. Your healthcare provider can help you figure out what is best for you.    Choose 1 or more activities you enjoy.  Walking is one of the easiest things you can do. You can also try swimming, riding a bike, dancing, or taking an exercise class.    When to call your healthcare provider  Call your healthcare provider if you have any of these:     Chest pain or feel dizzy or lightheaded    Burning, tightness, pressure, or heaviness in your chest, neck, shoulders, back, or arms    Abnormal shortness of breath    More joint or muscle pain    A very fast or irregular heartbeat (palpitations)  [employer] last reviewed this educational content on 7/1/2019 2000-2021 [employer]. All rights reserved. This information is not intended as a substitute for professional medical care. Always follow your healthcare professional's instructions.          Understanding USDA MyPlate  The USDA has guidelines to help you make healthy food choices. These are called MyPlate. MyPlate shows the food groups that make up healthy meals using the image of a place setting. Before you eat, think about the healthiest choices for what to put on your plate or in your cup or bowl. To learn more about building a healthy plate, visit www.choosemyplate.gov.    The food groups    Fruits. Any fruit or 100% fruit juice counts as part of the Fruit Group. Fruits may be fresh, canned, frozen, or dried, and may be whole, cut-up, or pureed. Make 1/2 of your plate fruits and vegetables.    Vegetables. Any vegetable  or 100% vegetable juice counts as a member of the Vegetable Group. Vegetables may be fresh, frozen, canned, or dried. They can be served raw or cooked and may be whole, cut-up, or mashed. Make 1/2 of your plate fruits and vegetables.    Grains. All foods made from grains are part of the Grains Group. These include wheat, rice, oats, cornmeal, and barley. Grains are often used to make foods such as bread, pasta, oatmeal, cereal, tortillas, and grits. Grains should be no more than 1/4 of your plate. At least half of your grains should be whole grains.    Protein. This group includes meat, poultry, seafood, beans and peas, eggs, processed soy products (such as tofu), nuts (including nut butters), and seeds. Make protein choices no more than 1/4 of your plate. Meat and poultry choices should be lean or low fat.    Dairy. The Dairy Group includes all fluid milk products and foods made from milk that contain calcium, such as yogurt and cheese. (Foods that have little calcium, such as cream, butter, and cream cheese, are not part of this group.) Most dairy choices should be low-fat or fat-free.    Oils. Oils aren't a food group, but they do contain essential nutrients. However it's important to watch your intake of oils. These are fats that are liquid at room temperature. They include canola, corn, olive, soybean, vegetable, and sunflower oil. Foods that are mainly oil include mayonnaise, certain salad dressings, and soft margarines. You likely already get your daily oil allowance from the foods you eat.  Things to limit  Eating healthy also means limiting these things in your diet:       Salt (sodium). Many processed foods have a lot of sodium. To keep sodium intake down, eat fresh vegetables, meats, poultry, and seafood when possible. Purchase low-sodium, reduced-sodium, or no-salt-added food products at the store. And don't add salt to your meals at home. Instead, season them with herbs and spices such as dill, oregano,  cumin, and paprika. Or try adding flavor with lemon or lime zest and juice.    Saturated fat. Saturated fats are most often found in animal products such as beef, pork, and chicken. They are often solid at room temperature, such as butter. To reduce your saturated fat intake, choose leaner cuts of meat and poultry. And try healthier cooking methods such as grilling, broiling, roasting, or baking. For a simple lower-fat swap, use plain nonfat yogurt instead of mayonnaise when making potato salad or macaroni salad.    Added sugars. These are sugars added to foods. They are in foods such as ice cream, candy, soda, fruit drinks, sports drinks, energy drinks, cookies, pastries, jams, and syrups. Cut down on added sugars by sharing sweet treats with a family member or friend. You can also choose fruit for dessert, and drink water or other unsweetened beverages.     [employer] last reviewed this educational content on 6/1/2020 2000-2021 [employer]. All rights reserved. This information is not intended as a substitute for professional medical care. Always follow your healthcare professional's instructions.        Your Health Risk Assessment indicates you feel you are not in good emotional health.    Recreation   Recreation is not limited to sports and team events. It includes any activity that provides relaxation, interest, enjoyment, and exercise. Recreation provides an outlet for physical, mental, and social energy. It can give a sense of worth and achievement. It can help you stay healthy.    Mental Exercise and Social Involvement  Mental and emotional health is as important as physical health. Keep in touch with friends and family. Stay as active as possible. Continue to learn and challenge yourself.   Things you can do to stay mentally active are:    Learn something new, like a foreign language or musical instrument.     Play SCRABBLE or do crossword puzzles. If you cannot find people to play these  games with you at home, you can play them with others on your computer through the Internet.     Join a games club--anything from card games to chess or checkers or lawn bowling.     Start a new hobby.     Go back to school.     Volunteer.     Read.   Keep up with world events.

## 2022-02-03 NOTE — PROGRESS NOTES
"SUBJECTIVE:   Arline Saini is a 71 year old female who presents for Preventive Visit.    Patient has been advised of split billing requirements and indicates understanding: Yes  Are you in the first 12 months of your Medicare coverage?  No    Healthy Habits:    In general, how would you rate your overall health?  Fair    Frequency of exercise:  None    Duration of exercise:  Less than 15 minutes    Do you usually eat at least 4 servings of fruit and vegetables a day, include whole grains    & fiber and avoid regularly eating high fat or \"junk\" foods?  No    Taking medications regularly:  Yes    Barriers to taking medications:  None    Medication side effects:  None    Ability to successfully perform activities of daily living:  No assistance needed    Home Safety:  No safety concerns identified    Hearing Impairment:  No hearing concerns    In the past 6 months, have you been bothered by leaking of urine?  No    In general, how would you rate your overall mental or emotional health?  Fair      PHQ-2 Total Score:    Additional concerns today:  No     Some issues with medication compliance.  However she is taking losartan along with bisoprolol and omeprazole.  She does not take spironolactone or Lasix only take it as needed.  Denies any chest pains no shortness of breath  Complains of some noninflamed skin tags on the skin in the neck area    Do you feel safe in your environment? Yes    Have you ever done Advance Care Planning? (For example, a Health Directive, POLST, or a discussion with a medical provider or your loved ones about your wishes): No, advance care planning information given to patient to review.  Advanced care planning was discussed at today's visit.       Fall risk  Fallen 2 or more times in the past year?: No  Any fall with injury in the past year?: No  click delete button to remove this line now  Cognitive Screening   1) Repeat 3 items (Leader, Season, Table)    2) Clock draw: NORMAL  3) 3 item " recall: Recalls 3 objects  Results: 3 items recalled: COGNITIVE IMPAIRMENT LESS LIKELY    Mini-CogTM Copyright [employer]. Licensed by the author for use in [facility]; reprinted with permission ([email or web address]). All rights reserved.      Do you have sleep apnea, excessive snoring or daytime drowsiness?: no    Reviewed and updated as needed this visit by clinical staff  Tobacco  Allergies  Meds             Reviewed and updated as needed this visit by Provider               Social History     Tobacco Use     Smoking status: Former Smoker     Quit date: 2/27/2018     Years since quitting: 3.9     Smokeless tobacco: Never Used     Tobacco comment: Smoked a few cigarettes every day for 40 years.   Substance Use Topics     Alcohol use: No     If you drink alcohol do you typically have >3 drinks per day or >7 drinks per week? No      No flowsheet data found.          Current providers sharing in care for this patient include:   Patient Care Team:  [facility] as PCP - General  Capo Coleman MD as Assigned Heart and Vascular Provider  Lyndon Valencia MD as Assigned PCP    The following health maintenance items are reviewed in Epic and correct as of today:  Health Maintenance Due   Topic Date Due     DEXA  Never done     ANNUAL REVIEW OF HM ORDERS  Never done     MAMMO SCREENING  Never done     COVID-19 Vaccine (1) Never done     COLORECTAL CANCER SCREENING  Never done     DTAP/TDAP/TD IMMUNIZATION (1 - Tdap) Never done     ZOSTER IMMUNIZATION (1 of 2) Never done     LUNG CANCER SCREENING  Never done     Pneumococcal Vaccine: 65+ Years (1 of 1 - PPSV23) Never done     INFLUENZA VACCINE (1) Never done     MICROALBUMIN  09/15/2021     BMP  12/11/2021     PHQ-2  01/01/2022     Lab work is in process  Pneumonia Vaccine:Adults age 65+ who received Pneumovax (PPSV23) at 65 years or older: Should be given PCV13 > 1 year after their most recent PPSV23        Review of Systems  CONSTITUTIONAL:  "NEGATIVE for fever, chills, change in weight  INTEGUMENTARY/SKIN: NEGATIVE for worrisome rashes, moles or lesions  EYES: NEGATIVE for vision changes or irritation  ENT/MOUTH: NEGATIVE for ear, mouth and throat problems  RESP: NEGATIVE for significant cough or SOB  BREAST: NEGATIVE for masses, tenderness or discharge  CV: NEGATIVE for chest pain, palpitations or peripheral edema  GI: NEGATIVE for nausea, abdominal pain, heartburn, or change in bowel habits  : NEGATIVE for frequency, dysuria, or hematuria  MUSCULOSKELETAL: NEGATIVE for significant arthralgias or myalgia  NEURO: NEGATIVE for weakness, dizziness or paresthesias  ENDOCRINE: NEGATIVE for temperature intolerance, skin/hair changes  HEME: NEGATIVE for bleeding problems  PSYCHIATRIC: NEGATIVE for changes in mood or affect    OBJECTIVE:   BP (!) 140/82   Pulse 82   Temp 97.4  F (36.3  C) (Tympanic)   Resp 14   Ht 1.63 m (5' 4.17\")   Wt 104.3 kg (230 lb)   SpO2 98%   BMI 39.27 kg/m   Estimated body mass index is 39.27 kg/m  as calculated from the following:    Height as of this encounter: 1.63 m (5' 4.17\").    Weight as of this encounter: 104.3 kg (230 lb).  Physical Exam  GENERAL: healthy, alert and no distress  EYES: Eyes grossly normal to inspection, PERRL and conjunctivae and sclerae normal  HENT: ear canals and TM's normal, nose and mouth without ulcers or lesions  NECK: no adenopathy, no asymmetry, masses, or scars and thyroid normal to palpation  RESP: lungs clear to auscultation - no rales, rhonchi or wheezes  CV: regular rate and rhythm, normal S1 S2, no S3 or S4, no murmur, click or rub, no peripheral edema and peripheral pulses strong  ABDOMEN: soft, nontender, no hepatosplenomegaly, no masses and bowel sounds normal  MS: no gross musculoskeletal defects noted, no edema  SKIN: no suspicious lesions or rashes  NEURO: Normal strength and tone, mentation intact and speech normal  PSYCH: mentation appears normal, affect " "normal/bright    Diagnostic Test Results:  Labs reviewed in Epic    ASSESSMENT / PLAN:       ICD-10-CM    1. Essential hypertension  I10 Comprehensive metabolic panel     bisoprolol (ZEBETA) 10 MG tablet     losartan (COZAAR) 100 MG tablet   2. Encounter for annual physical exam  Z00.00 Comprehensive metabolic panel     Lipid Profile     Hemoglobin   3. Aortic root dilatation (H)  I77.810 bisoprolol (ZEBETA) 10 MG tablet     losartan (COZAAR) 100 MG tablet   4. Morbid obesity (H)  E66.01    5. Stage 3a chronic kidney disease (H)  N18.31 Comprehensive metabolic panel     Albumin Random Urine Quantitative with Creat Ratio   6. Gastroesophageal reflux disease with esophagitis, unspecified whether hemorrhage  K21.00 omeprazole (PRILOSEC) 20 MG DR capsule   7. Encounter for screening mammogram for breast cancer  Z12.31 *MA Screening Digital Bilateral   8. Colon cancer screening  Z12.11 Fecal colorectal cancer screen (FIT)   9. High priority for 2019-nCoV vaccine  Z23 COVID-19,PF,PFIZER (12+ Yrs GRAY LABEL)       We will get some blood work and follow-up on that.  Once done we will follow-up on the results.  Medication which she required is filled.  Does not take Lasix or spironolactone at this time.  Compliance to the medication were discussed.  Patient requested flu and Covid shot first dose which is given.  She will come back and get second dose and tetanus at that time.  Exercise diet discussed with the patient.    COUNSELING:  Reviewed preventive health counseling, as reflected in patient instructions       Regular exercise       Healthy diet/nutrition    Estimated body mass index is 39.27 kg/m  as calculated from the following:    Height as of this encounter: 1.63 m (5' 4.17\").    Weight as of this encounter: 104.3 kg (230 lb).        She reports that she quit smoking about 3 years ago. She has never used smokeless tobacco.      Appropriate preventive services were discussed with this patient, including applicable " screening as appropriate for cardiovascular disease, diabetes, osteopenia/osteoporosis, and glaucoma.  As appropriate for age/gender, discussed screening for colorectal cancer, prostate cancer, breast cancer, and cervical cancer. Checklist reviewing preventive services available has been given to the patient.    Reviewed patients plan of care and provided an AVS. The Basic Care Plan (routine screening as documented in Health Maintenance) for Arline meets the Care Plan requirement. This Care Plan has been established and reviewed with the Patient.    Counseling Resources:  ATP IV Guidelines  Pooled Cohorts Equation Calculator  Breast Cancer Risk Calculator  Breast Cancer: Medication to Reduce Risk  FRAX Risk Assessment  ICSI Preventive Guidelines  Dietary Guidelines for Americans, 2010  LMN-1's MyPlate  ASA Prophylaxis  Lung CA Screening    Corey Michael MD  [facility]    Identified Health Risks:    The patient was provided with suggestions to help her develop a healthy physical lifestyle.  She is at risk for lack of exercise and has been provided with information to increase physical activity for the benefit of her well-being.  The patient was counseled and encouraged to consider modifying their diet and eating habits. She was provided with information on recommended healthy diet options.  The patient was provided with suggestions to help her develop a healthy emotional lifestyle.

## 2022-02-04 LAB
ALBUMIN SERPL-MCNC: 3.6 G/DL (ref 3.4–5)
ALP SERPL-CCNC: 82 U/L (ref 40–150)
ALT SERPL W P-5'-P-CCNC: 27 U/L (ref 0–50)
ANION GAP SERPL CALCULATED.3IONS-SCNC: 4 MMOL/L (ref 3–14)
AST SERPL W P-5'-P-CCNC: 19 U/L (ref 0–45)
BILIRUB SERPL-MCNC: 1.3 MG/DL (ref 0.2–1.3)
BUN SERPL-MCNC: 10 MG/DL (ref 7–30)
CALCIUM SERPL-MCNC: 9.2 MG/DL (ref 8.5–10.1)
CHLORIDE BLD-SCNC: 113 MMOL/L (ref 94–109)
CHOLEST SERPL-MCNC: 216 MG/DL
CO2 SERPL-SCNC: 25 MMOL/L (ref 20–32)
CREAT SERPL-MCNC: 1.01 MG/DL (ref 0.52–1.04)
CREAT UR-MCNC: 68 MG/DL
FASTING STATUS PATIENT QL REPORTED: YES
GFR SERPL CREATININE-BSD FRML MDRD: 60 ML/MIN/1.73M2
GLUCOSE BLD-MCNC: 98 MG/DL (ref 70–99)
HDLC SERPL-MCNC: 56 MG/DL
LDLC SERPL CALC-MCNC: 139 MG/DL
MICROALBUMIN UR-MCNC: <5 MG/L
MICROALBUMIN/CREAT UR: NORMAL MG/G{CREAT}
NONHDLC SERPL-MCNC: 160 MG/DL
POTASSIUM BLD-SCNC: 3.8 MMOL/L (ref 3.4–5.3)
PROT SERPL-MCNC: 7.7 G/DL (ref 6.8–8.8)
SODIUM SERPL-SCNC: 142 MMOL/L (ref 133–144)
TRIGL SERPL-MCNC: 106 MG/DL

## 2022-02-16 ENCOUNTER — ANCILLARY PROCEDURE (OUTPATIENT)
Dept: MAMMOGRAPHY | Facility: CLINIC | Age: 72
End: 2022-02-16
Attending: FAMILY MEDICINE
Payer: COMMERCIAL

## 2022-02-16 DIAGNOSIS — Z12.31 ENCOUNTER FOR SCREENING MAMMOGRAM FOR BREAST CANCER: ICD-10-CM

## 2022-02-16 PROCEDURE — 77067 SCR MAMMO BI INCL CAD: CPT | Mod: TC | Performed by: RADIOLOGY

## 2022-02-16 PROCEDURE — 82274 ASSAY TEST FOR BLOOD FECAL: CPT | Performed by: FAMILY MEDICINE

## 2022-02-18 LAB — HEMOCCULT STL QL IA: NEGATIVE

## 2022-02-24 ENCOUNTER — ALLIED HEALTH/NURSE VISIT (OUTPATIENT)
Dept: FAMILY MEDICINE | Facility: CLINIC | Age: 72
End: 2022-02-24
Payer: COMMERCIAL

## 2022-02-24 DIAGNOSIS — Z23 HIGH PRIORITY FOR 2019-NCOV VACCINE: Primary | ICD-10-CM

## 2022-02-24 PROCEDURE — 91305 COVID-19,PF,PFIZER (12+ YRS): CPT

## 2022-02-24 PROCEDURE — 99207 PR NO CHARGE NURSE ONLY: CPT

## 2022-02-24 PROCEDURE — 0052A COVID-19,PF,PFIZER (12+ YRS): CPT

## 2022-03-18 ENCOUNTER — OFFICE VISIT (OUTPATIENT)
Dept: FAMILY MEDICINE | Facility: CLINIC | Age: 72
End: 2022-03-18
Payer: COMMERCIAL

## 2022-03-18 VITALS
OXYGEN SATURATION: 95 % | WEIGHT: 235.7 LBS | BODY MASS INDEX: 40.24 KG/M2 | HEART RATE: 58 BPM | HEIGHT: 64 IN | RESPIRATION RATE: 16 BRPM | TEMPERATURE: 97.7 F

## 2022-03-18 DIAGNOSIS — L72.0 EPIDERMOID CYST: Primary | ICD-10-CM

## 2022-03-18 PROCEDURE — 99213 OFFICE O/P EST LOW 20 MIN: CPT | Performed by: INTERNAL MEDICINE

## 2022-03-18 RX ORDER — CEPHALEXIN 500 MG/1
500 CAPSULE ORAL 2 TIMES DAILY
Qty: 14 CAPSULE | Refills: 0 | Status: SHIPPED | OUTPATIENT
Start: 2022-03-18 | End: 2023-05-31

## 2022-03-18 ASSESSMENT — PAIN SCALES - GENERAL: PAINLEVEL: MODERATE PAIN (4)

## 2022-03-18 NOTE — PROGRESS NOTES
"  Assessment & Plan     Epidermoid cyst  2% lidocaine injected around the cyst.  The cyst was lanced and drained.    Some induration noted Keflex for 5 days should be sufficient.    - cephALEXin (KEFLEX) 500 MG capsule; Take 1 capsule (500 mg) by mouth 2 times daily             See Patient Instructions    No follow-ups on file.    Gonzalo Cee MD  [facility] GM Nunn is a 71 year old who presents for the following health issues     HPI 71-year-old female with a painful cyst involving her left upper back.  No fevers.  No trauma.  Cyst on Left upper back          Review of Systems   Constitutional, HEENT, cardiovascular, pulmonary, gi and gu systems are negative, except as otherwise noted.      Objective    Pulse 58   Temp 97.7  F (36.5  C) (Tympanic)   Resp 16   Ht 1.626 m (5' 4\")   Wt 106.9 kg (235 lb 11.2 oz)   SpO2 95%   BMI 40.46 kg/m    Body mass index is 40.46 kg/m .  Physical Exam   Epidermoid cyst left upper back lanced and drained 2% lidocaine utilized.  Area was prepped and bandaged    Office Visit on 02/03/2022   Component Date Value Ref Range Status     Sodium 02/03/2022 142  133 - 144 mmol/L Final     Potassium 02/03/2022 3.8  3.4 - 5.3 mmol/L Final     Chloride 02/03/2022 113 (A) 94 - 109 mmol/L Final     Carbon Dioxide (CO2) 02/03/2022 25  20 - 32 mmol/L Final     Anion Gap 02/03/2022 4  3 - 14 mmol/L Final     Urea Nitrogen 02/03/2022 10  7 - 30 mg/dL Final     Creatinine 02/03/2022 1.01  0.52 - 1.04 mg/dL Final     Calcium 02/03/2022 9.2  8.5 - 10.1 mg/dL Final     Glucose 02/03/2022 98  70 - 99 mg/dL Final     Alkaline Phosphatase 02/03/2022 82  40 - 150 U/L Final     AST 02/03/2022 19  0 - 45 U/L Final     ALT 02/03/2022 27  0 - 50 U/L Final     Protein Total 02/03/2022 7.7  6.8 - 8.8 g/dL Final     Albumin 02/03/2022 3.6  3.4 - 5.0 g/dL Final     Bilirubin Total 02/03/2022 1.3  0.2 - 1.3 mg/dL Final     GFR Estimate 02/03/2022 60 (A) >60 mL/min/1.73m2 " Final    Effective December 21, 2021 eGFRcr in adults is calculated using the 2021 CKD-EPI creatinine equation which includes age and gender (Diego et al., NEJM, DOI: 10.1056/GZZXyk3504050)     Cholesterol 02/03/2022 216 (A) <200 mg/dL Final     Triglycerides 02/03/2022 106  <150 mg/dL Final     Direct Measure HDL 02/03/2022 56  >=50 mg/dL Final     LDL Cholesterol Calculated 02/03/2022 139 (A) <=100 mg/dL Final     Non HDL Cholesterol 02/03/2022 160 (A) <130 mg/dL Final     Patient Fasting > 8hrs? 02/03/2022 Yes   Final     Hemoglobin 02/03/2022 14.6  11.7 - 15.7 g/dL Final     Creatinine Urine mg/dL 02/03/2022 68  mg/dL Final     Albumin Urine mg/L 02/03/2022 <5  mg/L Final     Albumin Urine mg/g Cr 02/03/2022    Final    Unable to calculate:  Urine creatinine or albumin value below detectable level     Occult Blood Screen FIT 02/16/2022 Negative  Negative Final

## 2022-04-11 ENCOUNTER — MYC REFILL (OUTPATIENT)
Dept: FAMILY MEDICINE | Facility: CLINIC | Age: 72
End: 2022-04-11
Payer: COMMERCIAL

## 2022-04-11 DIAGNOSIS — L72.0 EPIDERMOID CYST: ICD-10-CM

## 2022-04-13 RX ORDER — CEPHALEXIN 500 MG/1
500 CAPSULE ORAL 2 TIMES DAILY
Qty: 14 CAPSULE | Refills: 0 | OUTPATIENT
Start: 2022-04-13

## 2022-04-15 DIAGNOSIS — I10 ESSENTIAL HYPERTENSION: ICD-10-CM

## 2022-04-19 RX ORDER — SPIRONOLACTONE 100 MG/1
TABLET, FILM COATED ORAL
Qty: 90 TABLET | Refills: 1 | OUTPATIENT
Start: 2022-04-19

## 2022-06-17 DIAGNOSIS — K21.00 GASTROESOPHAGEAL REFLUX DISEASE WITH ESOPHAGITIS, UNSPECIFIED WHETHER HEMORRHAGE: ICD-10-CM

## 2022-06-20 NOTE — TELEPHONE ENCOUNTER
Prescription approved per [facility] Refill Protocol.  Emmanuelle QUINTANILLA RN  [facility]

## 2022-06-21 ENCOUNTER — ALLIED HEALTH/NURSE VISIT (OUTPATIENT)
Dept: NURSING | Facility: CLINIC | Age: 72
End: 2022-06-21
Payer: COMMERCIAL

## 2022-06-21 ENCOUNTER — TELEPHONE (OUTPATIENT)
Dept: FAMILY MEDICINE | Facility: CLINIC | Age: 72
End: 2022-06-21

## 2022-06-21 DIAGNOSIS — I77.810 AORTIC ROOT DILATATION (H): ICD-10-CM

## 2022-06-21 DIAGNOSIS — I10 ESSENTIAL HYPERTENSION: Primary | ICD-10-CM

## 2022-06-21 DIAGNOSIS — I10 ESSENTIAL HYPERTENSION: ICD-10-CM

## 2022-06-21 PROCEDURE — 99207 PR NO CHARGE NURSE ONLY: CPT

## 2022-06-21 NOTE — PROGRESS NOTES
[place]  [place] via phone.     Pt walked into the clinic with concerns regarding her Bisoprolol rx. She states the pharmacy will not fill it for her until next month, 7/8/22. Pt states she only has one pill left. Reached out to [facility] in [place] who states they filled this rx for a 30 day supply on 6/8/22 which is why they are unable to fill it now. Pt insists she did not pick this prescription up this month and last got it refilled on 5/11/22. [facility] states she can pay out of pocket for medication, she does have 2 refills on file but this won't go through insurance as per their system she is requesting this too early. Pt also confirmed she is taking this as prescribed 1 10 mg tablet daily. Pt states she will pay out of pocket for medication to get to next fill date. Pt also had questions regarding Covid first booster, she is due in July, number given to schedule vaccine appointment at that time. Pt stated understanding.     Emmanuelle QUINTANILLA RN  [facility]

## 2022-09-18 ENCOUNTER — HEALTH MAINTENANCE LETTER (OUTPATIENT)
Age: 72
End: 2022-09-18

## 2022-11-14 ENCOUNTER — IMMUNIZATION (OUTPATIENT)
Dept: FAMILY MEDICINE | Facility: CLINIC | Age: 72
End: 2022-11-14
Payer: COMMERCIAL

## 2022-11-14 DIAGNOSIS — Z23 HIGH PRIORITY FOR 2019-NCOV VACCINE: Primary | ICD-10-CM

## 2022-11-14 PROCEDURE — 0124A COVID-19,PF,PFIZER BOOSTER BIVALENT: CPT

## 2022-11-14 PROCEDURE — 99207 PR NO CHARGE LOS: CPT

## 2022-11-14 PROCEDURE — 91312 COVID-19,PF,PFIZER BOOSTER BIVALENT: CPT

## 2023-01-10 DIAGNOSIS — I10 ESSENTIAL HYPERTENSION: ICD-10-CM

## 2023-01-10 DIAGNOSIS — I77.810 AORTIC ROOT DILATATION (H): ICD-10-CM

## 2023-01-10 DIAGNOSIS — K21.00 GASTROESOPHAGEAL REFLUX DISEASE WITH ESOPHAGITIS, UNSPECIFIED WHETHER HEMORRHAGE: ICD-10-CM

## 2023-01-11 DIAGNOSIS — I77.810 AORTIC ROOT DILATATION (H): ICD-10-CM

## 2023-01-11 DIAGNOSIS — I10 ESSENTIAL HYPERTENSION: ICD-10-CM

## 2023-01-11 RX ORDER — LOSARTAN POTASSIUM 50 MG/1
TABLET ORAL
Qty: 180 TABLET | OUTPATIENT
Start: 2023-01-11

## 2023-01-11 RX ORDER — BISOPROLOL FUMARATE 10 MG/1
TABLET, FILM COATED ORAL
Qty: 90 TABLET | Refills: 0 | Status: SHIPPED | OUTPATIENT
Start: 2023-01-11 | End: 2023-04-11

## 2023-01-12 RX ORDER — LOSARTAN POTASSIUM 100 MG/1
100 TABLET ORAL DAILY
Qty: 90 TABLET | Refills: 0 | Status: SHIPPED | OUTPATIENT
Start: 2023-01-12 | End: 2023-04-19

## 2023-02-20 ENCOUNTER — TELEPHONE (OUTPATIENT)
Dept: FAMILY MEDICINE | Facility: CLINIC | Age: 73
End: 2023-02-20
Payer: COMMERCIAL

## 2023-02-20 NOTE — TELEPHONE ENCOUNTER
Patient Quality Outreach    Patient is due for the following:   Colon Cancer Screening  Physical Preventive Adult Physical    Next Steps:   Patient was assigned appropriate questionnaire to complete    Type of outreach:    Sent "[employer] message.      Questions for provider review:    None     Johnny Hansen MA

## 2023-05-07 ENCOUNTER — HEALTH MAINTENANCE LETTER (OUTPATIENT)
Age: 73
End: 2023-05-07

## 2023-05-31 ENCOUNTER — OFFICE VISIT (OUTPATIENT)
Dept: FAMILY MEDICINE | Facility: CLINIC | Age: 73
End: 2023-05-31
Payer: COMMERCIAL

## 2023-05-31 VITALS
TEMPERATURE: 97.9 F | OXYGEN SATURATION: 99 % | BODY MASS INDEX: 39.78 KG/M2 | SYSTOLIC BLOOD PRESSURE: 134 MMHG | HEIGHT: 64 IN | HEART RATE: 60 BPM | RESPIRATION RATE: 18 BRPM | DIASTOLIC BLOOD PRESSURE: 84 MMHG | WEIGHT: 233 LBS

## 2023-05-31 DIAGNOSIS — E66.01 MORBID OBESITY (H): ICD-10-CM

## 2023-05-31 DIAGNOSIS — R06.00 DYSPNEA, UNSPECIFIED TYPE: ICD-10-CM

## 2023-05-31 DIAGNOSIS — E78.5 DYSLIPIDEMIA: ICD-10-CM

## 2023-05-31 DIAGNOSIS — N18.31 STAGE 3A CHRONIC KIDNEY DISEASE (H): ICD-10-CM

## 2023-05-31 DIAGNOSIS — M25.473 ANKLE EDEMA: ICD-10-CM

## 2023-05-31 DIAGNOSIS — Z12.11 SCREEN FOR COLON CANCER: ICD-10-CM

## 2023-05-31 DIAGNOSIS — Z00.00 ENCOUNTER FOR MEDICARE ANNUAL WELLNESS EXAM: Primary | ICD-10-CM

## 2023-05-31 DIAGNOSIS — I77.810 AORTIC ROOT DILATATION (H): ICD-10-CM

## 2023-05-31 DIAGNOSIS — R53.83 FATIGUE, UNSPECIFIED TYPE: ICD-10-CM

## 2023-05-31 DIAGNOSIS — Z12.31 ENCOUNTER FOR SCREENING MAMMOGRAM FOR BREAST CANCER: ICD-10-CM

## 2023-05-31 DIAGNOSIS — Z23 NEED FOR VACCINATION: ICD-10-CM

## 2023-05-31 DIAGNOSIS — Z78.0 ASYMPTOMATIC MENOPAUSE: ICD-10-CM

## 2023-05-31 LAB
ALBUMIN SERPL BCG-MCNC: 4.4 G/DL (ref 3.5–5.2)
ALP SERPL-CCNC: 81 U/L (ref 35–104)
ALT SERPL W P-5'-P-CCNC: 22 U/L (ref 10–35)
ANION GAP SERPL CALCULATED.3IONS-SCNC: 13 MMOL/L (ref 7–15)
AST SERPL W P-5'-P-CCNC: 35 U/L (ref 10–35)
BILIRUB SERPL-MCNC: 1.2 MG/DL
BUN SERPL-MCNC: 22.4 MG/DL (ref 8–23)
CALCIUM SERPL-MCNC: 9.4 MG/DL (ref 8.8–10.2)
CHLORIDE SERPL-SCNC: 109 MMOL/L (ref 98–107)
CHOLEST SERPL-MCNC: 203 MG/DL
CREAT SERPL-MCNC: 0.9 MG/DL (ref 0.51–0.95)
CREAT UR-MCNC: 159 MG/DL
DEPRECATED CALCIDIOL+CALCIFEROL SERPL-MC: 33 UG/L (ref 20–75)
DEPRECATED HCO3 PLAS-SCNC: 21 MMOL/L (ref 22–29)
ERYTHROCYTE [DISTWIDTH] IN BLOOD BY AUTOMATED COUNT: 13.9 % (ref 10–15)
GFR SERPL CREATININE-BSD FRML MDRD: 68 ML/MIN/1.73M2
GLUCOSE SERPL-MCNC: 98 MG/DL (ref 70–99)
HCT VFR BLD AUTO: 44.3 % (ref 35–47)
HDLC SERPL-MCNC: 64 MG/DL
HGB BLD-MCNC: 14.4 G/DL (ref 11.7–15.7)
LDLC SERPL CALC-MCNC: 129 MG/DL
MCH RBC QN AUTO: 29 PG (ref 26.5–33)
MCHC RBC AUTO-ENTMCNC: 32.5 G/DL (ref 31.5–36.5)
MCV RBC AUTO: 89 FL (ref 78–100)
MICROALBUMIN UR-MCNC: <12 MG/L
MICROALBUMIN/CREAT UR: NORMAL MG/G{CREAT}
NONHDLC SERPL-MCNC: 139 MG/DL
NT-PROBNP SERPL-MCNC: 421 PG/ML (ref 0–900)
PLATELET # BLD AUTO: 180 10E3/UL (ref 150–450)
POTASSIUM SERPL-SCNC: 3.7 MMOL/L (ref 3.4–5.3)
PROT SERPL-MCNC: 7.7 G/DL (ref 6.4–8.3)
RBC # BLD AUTO: 4.96 10E6/UL (ref 3.8–5.2)
SODIUM SERPL-SCNC: 143 MMOL/L (ref 136–145)
TRIGL SERPL-MCNC: 52 MG/DL
TSH SERPL DL<=0.005 MIU/L-ACNC: 2.59 UIU/ML (ref 0.3–4.2)
VIT B12 SERPL-MCNC: 546 PG/ML (ref 232–1245)
WBC # BLD AUTO: 5.6 10E3/UL (ref 4–11)

## 2023-05-31 PROCEDURE — 80053 COMPREHEN METABOLIC PANEL: CPT | Performed by: INTERNAL MEDICINE

## 2023-05-31 PROCEDURE — 99215 OFFICE O/P EST HI 40 MIN: CPT | Mod: 25 | Performed by: INTERNAL MEDICINE

## 2023-05-31 PROCEDURE — 82043 UR ALBUMIN QUANTITATIVE: CPT | Performed by: INTERNAL MEDICINE

## 2023-05-31 PROCEDURE — 82306 VITAMIN D 25 HYDROXY: CPT | Performed by: INTERNAL MEDICINE

## 2023-05-31 PROCEDURE — 82570 ASSAY OF URINE CREATININE: CPT | Performed by: INTERNAL MEDICINE

## 2023-05-31 PROCEDURE — 83880 ASSAY OF NATRIURETIC PEPTIDE: CPT | Performed by: INTERNAL MEDICINE

## 2023-05-31 PROCEDURE — G0439 PPPS, SUBSEQ VISIT: HCPCS | Performed by: INTERNAL MEDICINE

## 2023-05-31 PROCEDURE — 82274 ASSAY TEST FOR BLOOD FECAL: CPT | Performed by: INTERNAL MEDICINE

## 2023-05-31 PROCEDURE — 90471 IMMUNIZATION ADMIN: CPT | Performed by: INTERNAL MEDICINE

## 2023-05-31 PROCEDURE — 80061 LIPID PANEL: CPT | Performed by: INTERNAL MEDICINE

## 2023-05-31 PROCEDURE — 90714 TD VACC NO PRESV 7 YRS+ IM: CPT | Performed by: INTERNAL MEDICINE

## 2023-05-31 PROCEDURE — 84443 ASSAY THYROID STIM HORMONE: CPT | Performed by: INTERNAL MEDICINE

## 2023-05-31 PROCEDURE — 82607 VITAMIN B-12: CPT | Performed by: INTERNAL MEDICINE

## 2023-05-31 PROCEDURE — 85027 COMPLETE CBC AUTOMATED: CPT | Performed by: INTERNAL MEDICINE

## 2023-05-31 PROCEDURE — 36415 COLL VENOUS BLD VENIPUNCTURE: CPT | Performed by: INTERNAL MEDICINE

## 2023-05-31 RX ORDER — ALBUTEROL SULFATE 90 UG/1
2 AEROSOL, METERED RESPIRATORY (INHALATION) EVERY 6 HOURS PRN
Qty: 18 G | Refills: 1 | Status: SHIPPED | OUTPATIENT
Start: 2023-05-31

## 2023-05-31 ASSESSMENT — ENCOUNTER SYMPTOMS
COUGH: 1
MYALGIAS: 1

## 2023-05-31 ASSESSMENT — ACTIVITIES OF DAILY LIVING (ADL): CURRENT_FUNCTION: NO ASSISTANCE NEEDED

## 2023-05-31 NOTE — PROGRESS NOTES
"    The patient was provided with suggestions to help her develop a healthy physical lifestyle.  She is at risk for lack of exercise and has been provided with information to increase physical activity for the benefit of her well-being.  The patient was counseled and encouraged to consider modifying their diet and eating habits. She was provided with information on recommended healthy diet options.  The patient was provided with written information regarding signs of hearing loss.  The patient was provided with suggestions to help her develop a healthy emotional lifestyle.      Answers for HPI/ROS submitted by the patient on 5/31/2023  In general, how would you rate your overall physical health?: poor  Frequency of exercise:: None  Do you usually eat at least 4 servings of fruit and vegetables a day, include whole grains & fiber, and avoid regularly eating high fat or \"junk\" foods? : No  Taking medications regularly:: Yes  Medication side effects:: Muscle aches  Activities of Daily Living: no assistance needed  Home safety: no safety concerns identified  Hearing Impairment:: difficulty following a conversation in a noisy restaurant or crowded room, feel that people are mumbling or not speaking clearly, difficult to understand a speaker at a public meeting or [employer] service, need to ask people to speak up or repeat themselves, difficulty understanding soft or whispered speech, difficulty understanding speech on the telephone  In the past 6 months, have you been bothered by leaking of urine?: No  chest pain: Yes  cough: Yes  peripheral edema: Yes  myalgias: Yes  urgency: Yes  In general, how would you rate your overall mental or emotional health?: fair  Additional concerns today:: Yes      "

## 2023-05-31 NOTE — PROGRESS NOTES
"SUBJECTIVE:   Arline is a 73 year old who presents for Preventive Visit.       View : No data to display.            Patient has been advised of split billing requirements and indicates understanding: Yes  Are you in the first 12 months of your Medicare coverage?  No    Healthy Habits:     In general, how would you rate your overall health?  Poor    Frequency of exercise:  None    Do you usually eat at least 4 servings of fruit and vegetables a day, include whole grains    & fiber and avoid regularly eating high fat or \"junk\" foods?  No    Taking medications regularly:  Yes    Medication side effects:  Muscle aches    Ability to successfully perform activities of daily living:  No assistance needed    Home Safety:  No safety concerns identified    Hearing Impairment:  Difficulty following a conversation in a noisy restaurant or crowded room, feel that people are mumbling or not speaking clearly, difficult to understand a speaker at a public meeting or [employer] service, need to ask people to speak up or repeat themselves, difficulty understanding soft or whispered speech and difficulty understanding speech on the telephone    In the past 6 months, have you been bothered by leaking of urine?  No    In general, how would you rate your overall mental or emotional health?  Fair      PHQ-2 Total Score: 1    Additional concerns today:  Yes    Have you ever done Advance Care Planning? (For example, a Health Directive, POLST, or a discussion with a medical provider or your loved ones about your wishes): No, advance care planning information given to patient to review.  Patient declined advance care planning discussion at this time.    Hearing Acuity: Able to converse without any difficulty.    Fall risk  Fallen 2 or more times in the past year?: No  Any fall with injury in the past year?: No    Cognitive Screening   1) Repeat 3 items (Leader, Season, Table)    2) Clock draw: NORMAL  3) 3 item recall: Recalls 1 object "   Results: NORMAL clock, 1-2 items recalled: COGNITIVE IMPAIRMENT LESS LIKELY    Mini-CogTM Copyright GLORIA Barreto. Licensed by the author for use in [facility]; reprinted with permission ([email or web address]@[email or web address]). All rights reserved.      Do you have sleep apnea, excessive snoring or daytime drowsiness?: no    Reviewed and updated as needed this visit by clinical staff   Tobacco  Allergies  Meds  Problems  Med Hx  Surg Hx  Fam Hx          Reviewed and updated as needed this visit by Provider   Tobacco  Allergies  Meds  Problems  Med Hx  Surg Hx  Fam Hx         Social History     Tobacco Use     Smoking status: Former     Types: Cigarettes     Quit date: 2018     Years since quittin.2     Smokeless tobacco: Never     Tobacco comments:     Smoked a few cigarettes every day for 40 years.   Vaping Use     Vaping status: Not on file   Substance Use Topics     Alcohol use: No             2023     8:22 AM   Alcohol Use   Prescreen: >3 drinks/day or >7 drinks/week? Not Applicable          View : No data to display.              Do you have a current opioid prescription? No  Do you use any other controlled substances or medications that are not prescribed by a provider? None    Current providers sharing in care for this patient include:   Patient Care Team:  [facility], [place] Elise Cameron as PCP - Corey Cook MD as Assigned PCP    The following health maintenance items are reviewed in Epic and correct as of today:  Health Maintenance   Topic Date Due     DEXA  Never done     Pneumococcal Vaccine: 65+ Years (1 - PCV) Never done     ZOSTER IMMUNIZATION (1 of 2) Never done     LUNG CANCER SCREENING  Never done     INFLUENZA VACCINE (1) 2022     MEDICARE ANNUAL WELLNESS VISIT  2023     COLORECTAL CANCER SCREENING  2023     COVID-19 Vaccine (4 - Pfizer series) 2023     DTAP/TDAP/TD IMMUNIZATION (1 - Tdap) 2023     BMP  2023     MAMMO SCREENING  2024  "    LIPID  05/31/2024     MICROALBUMIN  05/31/2024     ANNUAL REVIEW OF HM ORDERS  05/31/2024     FALL RISK ASSESSMENT  05/31/2024     HEMOGLOBIN  05/31/2024     ADVANCE CARE PLANNING  05/31/2028     HEPATITIS C SCREENING  Completed     PHQ-2 (once per calendar year)  Completed     URINALYSIS  Completed     IPV IMMUNIZATION  Aged Out     MENINGITIS IMMUNIZATION  Aged Out     Lab work is in process  Labs reviewed in [facility]  Mammogram Screening: Mammogram Screening: Recommended mammography every 1-2 years with patient discussion and risk factor consideration        Review of Systems   Respiratory: Positive for cough.    Cardiovascular: Positive for chest pain and peripheral edema.   Genitourinary: Positive for urgency.   Musculoskeletal: Positive for myalgias.     Constitutional, HEENT, cardiovascular, pulmonary, GI, , musculoskeletal, neuro, skin, endocrine and psych systems are negative, except as otherwise noted.    OBJECTIVE:   /84   Pulse 60   Temp 97.9  F (36.6  C) (Temporal)   Resp 18   Ht 1.626 m (5' 4\")   Wt 105.7 kg (233 lb)   SpO2 99%   BMI 39.99 kg/m   Estimated body mass index is 39.99 kg/m  as calculated from the following:    Height as of this encounter: 1.626 m (5' 4\").    Weight as of this encounter: 105.7 kg (233 lb).  Physical Exam  GENERAL APPEARANCE: healthy, alert and no distress  EYES: Eyes grossly normal to inspection, PERRL and conjunctivae and sclerae normal  HENT: ear canals and TM's normal, nose and mouth without ulcers or lesions, oropharynx clear and oral mucous membranes moist  NECK: no adenopathy, no asymmetry, masses, or scars and thyroid normal to palpation  RESP: lungs clear to auscultation - no rales, rhonchi or wheezes  BREAST: normal without masses, tenderness or nipple discharge and no palpable axillary masses or adenopathy  CV: regular rate and rhythm, normal S1 S2, no S3 or S4, no murmur, click or rub, + non pitting ankle peripheral edema and peripheral pulses " strong  ABDOMEN: soft, nontender, no hepatosplenomegaly, no masses and bowel sounds normal  MS: no musculoskeletal defects are noted and gait is age appropriate without ataxia  SKIN: no suspicious lesions or rashes  NEURO: Normal strength and tone, sensory exam grossly normal, mentation intact and speech normal  PSYCH: mentation appears normal and affect normal/bright    Diagnostic Test Results:  Labs reviewed in Epic    ASSESSMENT / PLAN:       Assessment and Plan  1. Encounter for Medicare annual wellness exam  Pt is new to me, last seen by KRYSTIN Aaron for I&D of epidermoid cyst in 3/2022. She is here for AWV .  Last lab work in February 2022 showing improved CKD with normal creatinine but EGFR at 60, dyslipidemia with LDL at 139 and last CBC in 2021 normal.  Vitamin D never checked, will do all the baseline labs.  Blood pressure remaining at 140/80 - 130/84 .  Patient declining any changes on medications of blood pressure at this time all the risks understood.  - DEXA HIP/PELVIS/SPINE - Future; Future  - REVIEW OF HEALTH MAINTENANCE PROTOCOL ORDERS  - Lipid panel reflex to direct LDL Non-fasting; Future  - Albumin Random Urine Quantitative with Creat Ratio; Future  - Fecal colorectal cancer screen FIT - Future (S+30); Future  - PRIMARY CARE FOLLOW-UP SCHEDULING; Future  - TSH with free T4 reflex; Future  - Vitamin D Deficiency; Future  - Vitamin B12; Future  - CBC with platelets; Future  - TD PRESERV FREE, IM (7+ YRS) (DECAVAC/TENIVAC)  - Comprehensive metabolic panel (BMP + Alb, Alk Phos, ALT, AST, Total. Bili, TP); Future  - General PFT Lab (Please always keep checked); Future  - Pulmonary Function Test; Future  - *MA Screening Digital Bilateral; Future  - BNP-N terminal pro; Future  - Lipid panel reflex to direct LDL Non-fasting  - Albumin Random Urine Quantitative with Creat Ratio  - Fecal colorectal cancer screen FIT - Future (S+30)  - TSH with free T4 reflex  - Vitamin D Deficiency  - Vitamin B12  - CBC with  platelets  - Comprehensive metabolic panel (BMP + Alb, Alk Phos, ALT, AST, Total. Bili, TP)  - BNP-N terminal pro    2. Stage 3a chronic kidney disease (H)  - Albumin Random Urine Quantitative with Creat Ratio; Future  - Albumin Random Urine Quantitative with Creat Ratio    3. Aortic root dilatation (H)  Last echocardiogram in 2019 showing mildly dilated ascending aortic root at 3.7 cm.  I do not see any cardiology on board, given the new symptoms of ongoing exertional fatigue, dyspnea and ankle edema below we will consider rechecking echo and further cardiology referral if needed.  Will await for the lab work results.  Patient understood and agreed with the plan.    4. Morbid obesity (H)  Noted, discussed on benefits of weight reduction for improvement of her comorbidities mentioned.    5. Fatigue, unspecified type  Ongoing problem, uncontrolled will check the pertinent work-up and replete if needed.  - TSH with free T4 reflex; Future  - Vitamin D Deficiency; Future  - Vitamin B12; Future  - TSH with free T4 reflex  - Vitamin D Deficiency  - Vitamin B12    6. Dyspnea, unspecified type  New problem, given the history of former smoking will check lung function tests.  Albuterol inhaler as needed sent to the pharmacy.  - General PFT Lab (Please always keep checked); Future  - Pulmonary Function Test; Future  - BNP-N terminal pro; Future  - BNP-N terminal pro  - albuterol (PROAIR HFA/PROVENTIL HFA/VENTOLIN HFA) 108 (90 Base) MCG/ACT inhaler; Inhale 2 puffs into the lungs every 6 hours as needed for shortness of breath, wheezing or cough  Dispense: 18 g; Refill: 1    7. Ankle edema  New problem, nonpitting fullness of bilateral ankles with tenderness on palpation of the tibial shin.  Will check BNP to make sure no CHF before doing further consideration of possible venous congestion cannot be excluded on the differentials.  - BNP-N terminal pro; Future  - BNP-N terminal pro    8. Encounter for screening mammogram for  breast cancer  - *MA Screening Digital Bilateral; Future    9. Asymptomatic menopause  - DEXA HIP/PELVIS/SPINE - Future; Future    10. Need for vaccination  - TD PRESERV FREE, IM (7+ YRS) (DECAVAC/TENIVAC)    11. Screen for colon cancer  - Fecal colorectal cancer screen FIT - Future (S+30); Future  - Fecal colorectal cancer screen FIT - Future (S+30)    12. Dyslipidemia  Uncontrolled, started on low dose Simvastatin for improvement  - simvastatin (ZOCOR) 10 MG tablet; Take 1 tablet (10 mg) by mouth At Bedtime  Dispense: 90 tablet; Refill: 1      Pt daughter SHELLY was in the room initially and she was in a real hurry because she was not sure of the check-in time of patient and provider time for patient visit which she was confused.  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  Daughter has explained major concerns which she was taken care as above, later she left.  Remaining conversation of the plan was done by  services as below.      Khoa # [id number] - [facility]  services       Over 40 minutes spent outside the preventive visit ( 20 minutes ) on reviewing patient chart,  face to face encounter, greater than 50% time spent with plan/cordination of care and documentation as above in my A/P.       Please note that this note consists of symbols derived from keyboarding, dictation and/or voice recognition software. As a result, there may be errors in the script that have gone undetected. Please consider this when interpreting information found in this chart.    Patient Instructions     As discussed , please do fasting labs placed.     Please check her BP for 10 days at home and send me the BP log to adjust the dose of BP medications.     Placed Lung function tests and ordered for heart failure marker on the  blood work to decide on the cause of her SOB.     Pulmonary Function Test Address :     [facility]  [phone]  / [phone]  [address] [place] [address]    [facility] will distribute Patient Information Packet;   [facility] ([facility]) will contact patient by telephone  Please call the following departments if there are questions or need assistance:  [facility]: [phone]  [facility]: [phone]  [facility]: [phone]  [facility] Registered: [phone]  [facility]: [phone]  [facility]: [phone]     =======================================================      Patient Education  Personalized Prevention Plan  You are due for the preventive services outlined below.  Your care team is available to assist you in scheduling these services.  If you have already completed any of these items, please share that information with your care team to update in your medical record.  Health Maintenance Due   Topic Date Due     Osteoporosis Screening  Never done     ANNUAL REVIEW OF HM ORDERS  Never done     Pneumococcal Vaccine (1 - PCV) Never done     Zoster (Shingles) Vaccine (1 of 2) Never done     Diptheria Tetanus Pertussis (DTAP/TDAP/TD) Vaccine (1 - Tdap) Never done     LUNG CANCER SCREENING  Never done     Basic Metabolic Panel  08/03/2022     Flu Vaccine (1) 09/01/2022     Annual Wellness Visit  02/03/2023     Cholesterol Lab  02/03/2023     Kidney Microalbumin Urine Test  02/03/2023     Colorectal Cancer Screening  02/16/2023     COVID-19 Vaccine (4 - Pfizer series) 03/14/2023     Hemoglobin  02/03/2023     Your Health Risk Assessment indicates you feel you are not in good health    A healthy lifestyle helps keep the body fit and the mind alert. It helps protect you from disease, helps you fight disease, and helps  prevent chronic disease (disease that doesn't go away) from getting worse. This is important as you get older and begin to notice twinges in muscles and joints and a decline in the strength and stamina you once took for granted. A healthy lifestyle includes good healthcare, good nutrition, weight control, recreation, and regular exercise. Avoid harmful substances and do what you can to keep safe. Another part of a healthy lifestyle is stay mentally active and socially involved.    Good healthcare     Have a wellness visit every year.     If you have new symptoms, let us know right away. Don't wait until the next checkup.     Take medicines exactly as prescribed and keep your medicines in a safe place. Tell us if your medicine causes problems.   Healthy diet and weight control     Eat 3 or 4 small, nutritious, low-fat, high-fiber meals a day. Include a variety of fruits, vegetables, and whole-grain foods.     Make sure you get enough calcium in your diet. Calcium, vitamin D, and exercise help prevent osteoporosis (bone thinning).     If you live alone, try eating with others when you can. That way you get a good meal and have company while you eat it.     Try to keep a healthy weight. If you eat more calories than your body uses for energy, it will be stored as fat and you will gain weight.     Recreation   Recreation is not limited to sports and team events. It includes any activity that provides relaxation, interest, enjoyment, and exercise. Recreation provides an outlet for physical, mental, and social energy. It can give a sense of worth and achievement. It can help you stay healthy.    Mental Exercise and Social Involvement  Mental and emotional health is as important as physical health. Keep in touch with friends and family. Stay as active as possible. Continue to learn and challenge yourself.   Things you can do to stay mentally active are:    Learn something new, like a foreign language or musical instrument.      Play SCRABBLE or do crossword puzzles. If you cannot find people to play these games with you at home, you can play them with others on your computer through the Internet.     Join a games club--anything from card games to chess or checkers or lawn bowling.     Start a new hobby.     Go back to school.     Volunteer.     Read.   Keep up with world events.    Exercise for a Healthier Heart  You may wonder how you can improve the health of your heart. If you re thinking about exercise, you re on the right track. You don t need to become an athlete. But you do need a certain amount of brisk exercise to help strengthen your heart. If you have been diagnosed with a heart condition, your healthcare provider may advise exercise to help your condition. To help make exercise a habit, choose safe, fun activities.      Exercise with a friend. When activity is fun, you're more likely to stick with it.     Before you start  Check with your healthcare provider before starting an exercise program. This is especially important if you haven't been active for a while. It's also important if you have a long-term (chronic) health problem such as heart disease, diabetes, or obesity. Also check with your provider if you're at high risk for having these problems.   Why exercise?  Exercising regularly offers many healthy rewards. It can help you do all of these:     Improve your blood cholesterol level to help prevent further heart trouble.    Lower your blood pressure to help prevent a stroke or heart attack.    Control diabetes or reduce your risk of getting this disease.    Improve your heart and lung function.    Reach and stay at a healthy weight.    Make your muscles stronger so you can stay active.    Prevent falls and fractures by slowing the loss of bone mass (osteoporosis).    Manage stress better.    Improve your sense of self and your body image.  Exercise tips      Ease into your routine. Set small goals. Then build on  them. Talk with your healthcare provider first before starting an exercise routine if you're not sure what your activity level should be.    Exercise on most days. Aim for a total of at least 150 minutes (2 hours and 30 minutes) or more of moderate-intensity aerobic activity each week. You could also do 75 minutes (1 hour and 15 minutes) or more of vigorous-intensity aerobic activity each week. Or try for a combination of both. Moderate activity means that you breathe heavier and your heart rate increases, but you can still talk. Think about doing at least 30 minutes of moderate exercise, 5 times a week. It's OK to work up to the 30-minute period over time. Examples of moderate-intensity activity are brisk walking, gardening, and water aerobics.    Step up your daily activity level.  Along with your exercise program, try being more active the whole day. Walk instead of drive. Or park further away so that you take more steps each day. Do more household tasks or yard work. You may not be able to meet the advised amount of physical activity. But doing some moderate- or vigorous-intensity aerobic activity can help reduce your risk for heart disease. Your healthcare provider can help you figure out what is best for you.    Choose 1 or more activities you enjoy.  Walking is one of the easiest things you can do. You can also try swimming, riding a bike, dancing, or taking an exercise class.    Call 911  Call 911 right away if any of these occur:     Chest pain that doesn't go away quickly with rest    New burning, tightness, pressure, or heaviness in your chest, neck, shoulders, back, or arms    Abnormal or severe shortness of breath    A very fast or irregular heartbeat (palpitations)    Fainting  When to call your healthcare provider  Call your healthcare provider if you have any of these:     Dizziness or lightheadedness    Mild shortness of breath or chest pain    Increased or new joint or muscle pain    [employer] last  reviewed this educational content on 7/1/2022 2000-2022 [employer]. All rights reserved. This information is not intended as a substitute for professional medical care. Always follow your healthcare professional's instructions.          Understanding USDA MyPlate  The USDA has guidelines to help you make healthy food choices. These are called MyPlate. MyPlate shows the food groups that make up healthy meals using the image of a place setting. Before you eat, think about the healthiest choices for what to put on your plate or in your cup or bowl. To learn more about building a healthy plate, visit www.choosemyplate.gov.     The food groups    Fruits. Any fruit or 100% fruit juice counts as part of the Fruit Group. Fruits may be fresh, canned, frozen, or dried, and may be whole, cut-up, or pureed. Make 1/2 of your plate fruits and vegetables.    Vegetables. Any vegetable or 100% vegetable juice counts as a member of the Vegetable Group. Vegetables may be fresh, frozen, canned, or dried. They can be served raw or cooked and may be whole, cut-up, or mashed. Make 1/2 of your plate fruits and vegetables.    Grains. All foods made from grains are part of the Grains Group. These include wheat, rice, oats, cornmeal, and barley. Grains are often used to make foods such as bread, pasta, oatmeal, cereal, tortillas, and grits. Grains should be no more than 1/4 of your plate. At least half of your grains should be whole grains.    Protein. This group includes meat, poultry, seafood, beans and peas, eggs, processed soy products (such as tofu), nuts (including nut butters), and seeds. Make protein choices no more than 1/4 of your plate. Meat and poultry choices should be lean or low fat.    Dairy. The Dairy Group includes all fluid milk products and foods made from milk that contain calcium, such as yogurt and cheese. (Foods that have little calcium, such as cream, butter, and cream cheese, are not part of this  group.) Most dairy choices should be low-fat or fat-free.    Oils. Oils aren't a food group, but they do contain essential nutrients. However it's important to watch your intake of oils. These are fats that are liquid at room temperature. They include canola, corn, olive, soybean, vegetable, and sunflower oil. Foods that are mainly oil include mayonnaise, certain salad dressings, and soft margarines. You likely already get your daily oil allowance from the foods you eat.  Things to limit  Eating healthy also means limiting these things in your diet:    Salt (sodium). Many processed foods have a lot of sodium. To keep sodium intake down, eat fresh vegetables, meats, poultry, and seafood when possible. Purchase low-sodium, reduced-sodium, or no-salt-added food products at the store. And don't add salt to your meals at home. Instead, season them with herbs and spices such as dill, oregano, cumin, and paprika. Or try adding flavor with lemon or lime zest and juice.    Saturated fat. Saturated fats are most often found in animal products such as beef, pork, and chicken. They are often solid at room temperature, such as butter. To reduce your saturated fat intake, choose leaner cuts of meat and poultry. And try healthier cooking methods such as grilling, broiling, roasting, or baking. For a simple lower-fat swap, use plain nonfat yogurt instead of mayonnaise when making potato salad or macaroni salad.    Added sugars. These are sugars added to foods. They are in foods such as ice cream, candy, soda, fruit drinks, sports drinks, energy drinks, cookies, pastries, jams, and syrups. Cut down on added sugars by sharing sweet treats with a family member or friend. You can also choose fruit for dessert, and drink water or other unsweetened beverages.  [employer] last reviewed this educational content on 6/1/2020 2000-2022 [employer]. All rights reserved. This information is not intended as a substitute for  professional medical care. Always follow your healthcare professional's instructions.          Signs of Hearing Loss  Hearing loss is a problem shared by many people. In fact, it's one of the most common health problems, particularly as people age. Most people aged 65 and older have some hearing loss. By age 80, almost everyone does. Hearing loss often occurs slowly over the years. So, you may not realize your hearing has gotten worse.   When sudden hearing loss occurs, it's important to contact your healthcare provider right away. Your provider will do a medical exam and a hearing exam as soon as possible. This is to help find the cause and type of your sudden hearing loss. Based on your diagnosis, your healthcare provider will discuss possible treatments.      Hearing much better with one ear can be a sign of hearing loss.     Have your hearing checked  Call your healthcare provider if you:     Have to strain to hear normal conversation    Have to watch other people s faces very carefully to follow what they re saying    Need to ask people to repeat what they ve said    Often misunderstand what people are saying    Turn the volume of the television or radio up so high that others complain    Feel that people are mumbling when they re talking to you    Find that the effort to hear leaves you feeling tired and irritated    Notice, when using the phone, that you hear better with one ear than the other  [employer] last reviewed this educational content on 6/1/2022 2000-2022 [employer]. All rights reserved. This information is not intended as a substitute for professional medical care. Always follow your healthcare professional's instructions.        Your Health Risk Assessment indicates you feel you are not in good emotional health.    Recreation   Recreation is not limited to sports and team events. It includes any activity that provides relaxation, interest, enjoyment, and exercise. Recreation  provides an outlet for physical, mental, and social energy. It can give a sense of worth and achievement. It can help you stay healthy.    Mental Exercise and Social Involvement  Mental and emotional health is as important as physical health. Keep in touch with friends and family. Stay as active as possible. Continue to learn and challenge yourself.   Things you can do to stay mentally active are:    Learn something new, like a foreign language or musical instrument.     Play SCRABBLE or do crossword puzzles. If you cannot find people to play these games with you at home, you can play them with others on your computer through the Internet.     Join a games club--anything from card games to chess or checkers or lawn bowling.     Start a new hobby.     Go back to school.     Volunteer.     Read.   Keep up with world events.       Return in 6 months (on 11/30/2023), or if symptoms worsen or fail to improve.    Amber Raya MD  [facility]      Patient has been advised of split billing requirements and indicates understanding: Yes      COUNSELING:  Reviewed preventive health counseling, as reflected in patient instructions  Special attention given to:       Regular exercise       Healthy diet/nutrition       Vision screening       Hearing screening       Dental care       Bladder control       Fall risk prevention       Immunizations    Vaccinated for: Td             Osteoporosis prevention/bone health        She reports that she quit smoking about 5 years ago. She has never used smokeless tobacco.      Appropriate preventive services were discussed with this patient, including applicable screening as appropriate for cardiovascular disease, diabetes, osteopenia/osteoporosis, and glaucoma.  As appropriate for age/gender, discussed screening for colorectal cancer, prostate cancer, breast cancer, and cervical cancer. Checklist reviewing preventive services available has been given to the  patient.    Reviewed patients plan of care and provided an AVS. The Basic Care Plan (routine screening as documented in Health Maintenance) for Arline meets the Care Plan requirement. This Care Plan has been established and reviewed with the Patient.      Amber Raya MD  [facility]    Identified Health Risks:    I have reviewed Opioid Use Disorder and Substance Use Disorder risk factors and made any needed referrals.

## 2023-05-31 NOTE — PATIENT INSTRUCTIONS
As discussed , please do fasting labs placed.     Please check her BP for 10 days at home and send me the BP log to adjust the dose of BP medications.     Placed Lung function tests and ordered for heart failure marker on the blood work to decide on the cause of her SOB.     Pulmonary Function Test Address :     [facility]  [phone]  / [phone]  [address] [place] [address]    [facility] will distribute Patient Information Packet;   [facility] ([facility]) will contact patient by telephone  Please call the following departments if there are questions or need assistance:  [facility]: [phone]  [facility]: [phone]  [facility]: [phone]  [facility] Reg[facility]: [phone]  [facility]: [phone]  [facility]: [phone]     =======================================================      Patient Education   Personalized Prevention Plan  You are due for the preventive services outlined below.  Your care team is available to assist you in scheduling these services.  If you have already completed any of these items, please share that information with your care team to update in your medical record.  Health Maintenance Due   Topic Date Due    Osteoporosis Screening  Never done    ANNUAL REVIEW OF HM ORDERS  Never done    Pneumococcal Vaccine (1 - PCV) Never done    Zoster (Shingles) Vaccine (1 of 2) Never done    Diptheria Tetanus Pertussis (DTAP/TDAP/TD) Vaccine (1 - Tdap) Never done    LUNG CANCER SCREENING  Never done    Basic Metabolic Panel  08/03/2022    Flu Vaccine (1) 09/01/2022    Annual Wellness Visit  02/03/2023    Cholesterol Lab  02/03/2023    Kidney Microalbumin Urine Test  02/03/2023    Colorectal Cancer Screening  02/16/2023    COVID-19 Vaccine (4 - Pfizer series) 03/14/2023    Hemoglobin  02/03/2023     Your  Health Risk Assessment indicates you feel you are not in good health    A healthy lifestyle helps keep the body fit and the mind alert. It helps protect you from disease, helps you fight disease, and helps prevent chronic disease (disease that doesn't go away) from getting worse. This is important as you get older and begin to notice twinges in muscles and joints and a decline in the strength and stamina you once took for granted. A healthy lifestyle includes good healthcare, good nutrition, weight control, recreation, and regular exercise. Avoid harmful substances and do what you can to keep safe. Another part of a healthy lifestyle is stay mentally active and socially involved.    Good healthcare   Have a wellness visit every year.   If you have new symptoms, let us know right away. Don't wait until the next checkup.   Take medicines exactly as prescribed and keep your medicines in a safe place. Tell us if your medicine causes problems.   Healthy diet and weight control   Eat 3 or 4 small, nutritious, low-fat, high-fiber meals a day. Include a variety of fruits, vegetables, and whole-grain foods.   Make sure you get enough calcium in your diet. Calcium, vitamin D, and exercise help prevent osteoporosis (bone thinning).   If you live alone, try eating with others when you can. That way you get a good meal and have company while you eat it.   Try to keep a healthy weight. If you eat more calories than your body uses for energy, it will be stored as fat and you will gain weight.     Recreation   Recreation is not limited to sports and team events. It includes any activity that provides relaxation, interest, enjoyment, and exercise. Recreation provides an outlet for physical, mental, and social energy. It can give a sense of worth and achievement. It can help you stay healthy.    Mental Exercise and Social Involvement  Mental and emotional health is as important as physical health. Keep in touch with friends and  family. Stay as active as possible. Continue to learn and challenge yourself.   Things you can do to stay mentally active are:  Learn something new, like a foreign language or musical instrument.   Play SCRABBLE or do crossword puzzles. If you cannot find people to play these games with you at home, you can play them with others on your computer through the Internet.   Join a games club--anything from card games to chess or checkers or lawn bowling.   Start a new hobby.   Go back to school.   Volunteer.   Read.   Keep up with world events.    Exercise for a Healthier Heart  You may wonder how you can improve the health of your heart. If you re thinking about exercise, you re on the right track. You don t need to become an athlete. But you do need a certain amount of brisk exercise to help strengthen your heart. If you have been diagnosed with a heart condition, your healthcare provider may advise exercise to help your condition. To help make exercise a habit, choose safe, fun activities.      Exercise with a friend. When activity is fun, you're more likely to stick with it.     Before you start  Check with your healthcare provider before starting an exercise program. This is especially important if you haven't been active for a while. It's also important if you have a long-term (chronic) health problem such as heart disease, diabetes, or obesity. Also check with your provider if you're at high risk for having these problems.   Why exercise?  Exercising regularly offers many healthy rewards. It can help you do all of these:   Improve your blood cholesterol level to help prevent further heart trouble.  Lower your blood pressure to help prevent a stroke or heart attack.  Control diabetes or reduce your risk of getting this disease.  Improve your heart and lung function.  Reach and stay at a healthy weight.  Make your muscles stronger so you can stay active.  Prevent falls and fractures by slowing the loss of bone mass  (osteoporosis).  Manage stress better.  Improve your sense of self and your body image.  Exercise tips    Ease into your routine. Set small goals. Then build on them. Talk with your healthcare provider first before starting an exercise routine if you're not sure what your activity level should be.  Exercise on most days. Aim for a total of at least 150 minutes (2 hours and 30 minutes) or more of moderate-intensity aerobic activity each week. You could also do 75 minutes (1 hour and 15 minutes) or more of vigorous-intensity aerobic activity each week. Or try for a combination of both. Moderate activity means that you breathe heavier and your heart rate increases, but you can still talk. Think about doing at least 30 minutes of moderate exercise, 5 times a week. It's OK to work up to the 30-minute period over time. Examples of moderate-intensity activity are brisk walking, gardening, and water aerobics.  Step up your daily activity level.  Along with your exercise program, try being more active the whole day. Walk instead of drive. Or park further away so that you take more steps each day. Do more household tasks or yard work. You may not be able to meet the advised amount of physical activity. But doing some moderate- or vigorous-intensity aerobic activity can help reduce your risk for heart disease. Your healthcare provider can help you figure out what is best for you.  Choose 1 or more activities you enjoy.  Walking is one of the easiest things you can do. You can also try swimming, riding a bike, dancing, or taking an exercise class.    Call 911  Call 911 right away if any of these occur:   Chest pain that doesn't go away quickly with rest  New burning, tightness, pressure, or heaviness in your chest, neck, shoulders, back, or arms  Abnormal or severe shortness of breath  A very fast or irregular heartbeat (palpitations)  Fainting  When to call your healthcare provider  Call your healthcare provider if you have  any of these:   Dizziness or lightheadedness  Mild shortness of breath or chest pain  Increased or new joint or muscle pain    [employer] last reviewed this educational content on 7/1/2022 2000-2022 [employer]. All rights reserved. This information is not intended as a substitute for professional medical care. Always follow your healthcare professional's instructions.          Understanding USDA MyPlate  The USDA has guidelines to help you make healthy food choices. These are called MyPlate. MyPlate shows the food groups that make up healthy meals using the image of a place setting. Before you eat, think about the healthiest choices for what to put on your plate or in your cup or bowl. To learn more about building a healthy plate, visit www.choosemyplate.gov.     The food groups  Fruits. Any fruit or 100% fruit juice counts as part of the Fruit Group. Fruits may be fresh, canned, frozen, or dried, and may be whole, cut-up, or pureed. Make 1/2 of your plate fruits and vegetables.  Vegetables. Any vegetable or 100% vegetable juice counts as a member of the Vegetable Group. Vegetables may be fresh, frozen, canned, or dried. They can be served raw or cooked and may be whole, cut-up, or mashed. Make 1/2 of your plate fruits and vegetables.  Grains. All foods made from grains are part of the Grains Group. These include wheat, rice, oats, cornmeal, and barley. Grains are often used to make foods such as bread, pasta, oatmeal, cereal, tortillas, and grits. Grains should be no more than 1/4 of your plate. At least half of your grains should be whole grains.  Protein. This group includes meat, poultry, seafood, beans and peas, eggs, processed soy products (such as tofu), nuts (including nut butters), and seeds. Make protein choices no more than 1/4 of your plate. Meat and poultry choices should be lean or low fat.  Dairy. The Dairy Group includes all fluid milk products and foods made from milk that contain  calcium, such as yogurt and cheese. (Foods that have little calcium, such as cream, butter, and cream cheese, are not part of this group.) Most dairy choices should be low-fat or fat-free.  Oils. Oils aren't a food group, but they do contain essential nutrients. However it's important to watch your intake of oils. These are fats that are liquid at room temperature. They include canola, corn, olive, soybean, vegetable, and sunflower oil. Foods that are mainly oil include mayonnaise, certain salad dressings, and soft margarines. You likely already get your daily oil allowance from the foods you eat.  Things to limit  Eating healthy also means limiting these things in your diet:  Salt (sodium). Many processed foods have a lot of sodium. To keep sodium intake down, eat fresh vegetables, meats, poultry, and seafood when possible. Purchase low-sodium, reduced-sodium, or no-salt-added food products at the store. And don't add salt to your meals at home. Instead, season them with herbs and spices such as dill, oregano, cumin, and paprika. Or try adding flavor with lemon or lime zest and juice.  Saturated fat. Saturated fats are most often found in animal products such as beef, pork, and chicken. They are often solid at room temperature, such as butter. To reduce your saturated fat intake, choose leaner cuts of meat and poultry. And try healthier cooking methods such as grilling, broiling, roasting, or baking. For a simple lower-fat swap, use plain nonfat yogurt instead of mayonnaise when making potato salad or macaroni salad.  Added sugars. These are sugars added to foods. They are in foods such as ice cream, candy, soda, fruit drinks, sports drinks, energy drinks, cookies, pastries, jams, and syrups. Cut down on added sugars by sharing sweet treats with a family member or friend. You can also choose fruit for dessert, and drink water or other unsweetened beverages.  [employer] last reviewed this educational content on  6/1/2020 2000-2022 [employer]. All rights reserved. This information is not intended as a substitute for professional medical care. Always follow your healthcare professional's instructions.          Signs of Hearing Loss  Hearing loss is a problem shared by many people. In fact, it's one of the most common health problems, particularly as people age. Most people aged 65 and older have some hearing loss. By age 80, almost everyone does. Hearing loss often occurs slowly over the years. So, you may not realize your hearing has gotten worse.   When sudden hearing loss occurs, it's important to contact your healthcare provider right away. Your provider will do a medical exam and a hearing exam as soon as possible. This is to help find the cause and type of your sudden hearing loss. Based on your diagnosis, your healthcare provider will discuss possible treatments.      Hearing much better with one ear can be a sign of hearing loss.     Have your hearing checked  Call your healthcare provider if you:   Have to strain to hear normal conversation  Have to watch other people s faces very carefully to follow what they re saying  Need to ask people to repeat what they ve said  Often misunderstand what people are saying  Turn the volume of the television or radio up so high that others complain  Feel that people are mumbling when they re talking to you  Find that the effort to hear leaves you feeling tired and irritated  Notice, when using the phone, that you hear better with one ear than the other  [employer] last reviewed this educational content on 6/1/2022 2000-2022 [employer]. All rights reserved. This information is not intended as a substitute for professional medical care. Always follow your healthcare professional's instructions.        Your Health Risk Assessment indicates you feel you are not in good emotional health.    Recreation   Recreation is not limited to sports and team  events. It includes any activity that provides relaxation, interest, enjoyment, and exercise. Recreation provides an outlet for physical, mental, and social energy. It can give a sense of worth and achievement. It can help you stay healthy.    Mental Exercise and Social Involvement  Mental and emotional health is as important as physical health. Keep in touch with friends and family. Stay as active as possible. Continue to learn and challenge yourself.   Things you can do to stay mentally active are:  Learn something new, like a foreign language or musical instrument.   Play SCRABBLE or do crossword puzzles. If you cannot find people to play these games with you at home, you can play them with others on your computer through the Internet.   Join a games club--anything from card games to chess or checkers or lawn bowling.   Start a new hobby.   Go back to school.   Volunteer.   Read.   Keep up with world events.

## 2023-06-01 ENCOUNTER — TELEPHONE (OUTPATIENT)
Dept: FAMILY MEDICINE | Facility: CLINIC | Age: 73
End: 2023-06-01
Payer: COMMERCIAL

## 2023-06-01 LAB — HEMOCCULT STL QL IA: NEGATIVE

## 2023-06-01 RX ORDER — SIMVASTATIN 10 MG
10 TABLET ORAL AT BEDTIME
Qty: 90 TABLET | Refills: 1 | Status: SHIPPED | OUTPATIENT
Start: 2023-06-01 | End: 2024-06-06

## 2023-06-01 NOTE — TELEPHONE ENCOUNTER
----- Message from Amber Raya MD sent at 6/1/2023  1:29 AM CDT -----  Your Bad Cholesterol LDL is above goal of 100 given risk factors of Hypertension on medications.  Started on low-dose simvastatin to avoid complications of high cholesterol.    All your OTHER labs are normal, there might be some highlighted which doesn't have any clinical significance.    Amber Raya MD  Internal medicine physician  [facility]

## 2023-06-02 NOTE — RESULT ENCOUNTER NOTE
Your FIT test is negative, recommend repeat in one year.  Please let me know if you have any questions.    Thank you,  Amber Raya MD on 6/1/2023   [facility]

## 2023-06-02 NOTE — TELEPHONE ENCOUNTER
Spoke to patient through . She understands her lab results and medication.       Hannah Hanks RN  [facility]

## 2023-07-09 DIAGNOSIS — I77.810 AORTIC ROOT DILATATION (H): ICD-10-CM

## 2023-07-09 DIAGNOSIS — I10 ESSENTIAL HYPERTENSION: ICD-10-CM

## 2023-07-10 RX ORDER — BISOPROLOL FUMARATE 10 MG/1
TABLET, FILM COATED ORAL
Qty: 90 TABLET | Refills: 1 | Status: SHIPPED | OUTPATIENT
Start: 2023-07-10 | End: 2023-08-28

## 2023-08-02 ENCOUNTER — HOSPITAL ENCOUNTER (OUTPATIENT)
Dept: CARDIAC REHAB | Facility: CLINIC | Age: 73
Discharge: HOME | End: 2023-08-02
Attending: INTERNAL MEDICINE
Payer: COMMERCIAL

## 2023-08-02 DIAGNOSIS — Z00.00 ENCOUNTER FOR MEDICARE ANNUAL WELLNESS EXAM: ICD-10-CM

## 2023-08-02 DIAGNOSIS — R06.00 DYSPNEA, UNSPECIFIED TYPE: ICD-10-CM

## 2023-08-02 PROCEDURE — 94729 DIFFUSING CAPACITY: CPT

## 2023-08-02 PROCEDURE — 94060 EVALUATION OF WHEEZING: CPT

## 2023-08-03 LAB
DLCOUNC-%PRED-PRE: 108 %
DLCOUNC-PRE: 20.35 ML/MIN/MMHG
DLCOUNC-PRED: 18.84 ML/MIN/MMHG
ERV-%PRED-PRE: 8 %
ERV-PRE: 0.06 L
ERV-PRED: 0.71 L
EXPTIME-PRE: 6.74 SEC
FEF2575-%PRED-POST: 130 %
FEF2575-%PRED-PRE: 89 %
FEF2575-POST: 2.23 L/SEC
FEF2575-PRE: 1.53 L/SEC
FEF2575-PRED: 1.71 L/SEC
FEFMAX-%PRED-PRE: 86 %
FEFMAX-PRE: 4.73 L/SEC
FEFMAX-PRED: 5.49 L/SEC
FEV1-%PRED-PRE: 90 %
FEV1-PRE: 1.8 L
FEV1FEV6-PRE: 78 %
FEV1FEV6-PRED: 79 %
FEV1FVC-PRE: 78 %
FEV1FVC-PRED: 79 %
FEV1SVC-PRE: 76 %
FEV1SVC-PRED: 69 %
FIFMAX-PRE: 3.8 L/SEC
FVC-%PRED-PRE: 90 %
FVC-PRE: 2.33 L
FVC-PRED: 2.56 L
IC-%PRED-PRE: 107 %
IC-PRE: 2.31 L
IC-PRED: 2.16 L
VA-%PRED-PRE: 93 %
VA-PRE: 4.25 L
VC-%PRED-PRE: 81 %
VC-PRE: 2.38 L
VC-PRED: 2.92 L

## 2023-08-08 ENCOUNTER — TELEPHONE (OUTPATIENT)
Dept: FAMILY MEDICINE | Facility: CLINIC | Age: 73
End: 2023-08-08
Payer: COMMERCIAL

## 2023-08-08 NOTE — TELEPHONE ENCOUNTER
----- Message from Amber Raya MD sent at 8/5/2023  5:55 PM CDT -----  Good news! Your Lung function tests are normal.

## 2023-08-09 NOTE — TELEPHONE ENCOUNTER
Called pt, LVM requesting callback to go over result. See notes below. 1st attempt      Kimberley Taylor RN on 8/9/2023 at 10:52 AM

## 2023-08-16 NOTE — TELEPHONE ENCOUNTER
Home Care:  · Application of superficial heat (thermacare patches, heating pad etc.)  · Home based exercises  · NSAIDS (Non-steroidal anti-inflammatories example ibuprofen)    (Diagnosis and Treatment of Low Back Pain: A Joint Clinical Practice  Guideline from the American College of Physicians and the American  Pain Society Annals of Internal Medicine  Issue: Volume 147(7), 2 October 2007, pp I-38)     Patient Contact  #[id number]     Attempt # 2     Was call answered?    No  Left non-detailed message to call the clinic back at [phone].      On call back:      -Relay message from Dr. Raya, see below.    Emmanuelle QUINTANILLA RN  [facility]

## 2023-08-17 NOTE — TELEPHONE ENCOUNTER
Cruz Nunn,     Here are my comments about the recent results.     Good news! Your Lung function tests are normal.     Please let us know if you have any questions or concerns.     Regards,  Amber Raya MD   Written by Amber Raya MD on 8/5/2023  5:55 PM CDT  Seen by patient Arline Saini on 8/16/2023 12:02 PM      Patient has read the my chart result and provider comments.       Will close the encounter.       Hannah Hanks RN  -[facility]

## 2023-08-25 DIAGNOSIS — I10 ESSENTIAL HYPERTENSION: ICD-10-CM

## 2023-08-25 DIAGNOSIS — I77.810 AORTIC ROOT DILATATION (H): ICD-10-CM

## 2023-08-25 DIAGNOSIS — K21.00 GASTROESOPHAGEAL REFLUX DISEASE WITH ESOPHAGITIS, UNSPECIFIED WHETHER HEMORRHAGE: ICD-10-CM

## 2023-08-28 RX ORDER — BISOPROLOL FUMARATE 10 MG/1
TABLET, FILM COATED ORAL
Qty: 90 TABLET | Refills: 2 | Status: SHIPPED | OUTPATIENT
Start: 2023-08-28 | End: 2024-05-20

## 2023-08-28 RX ORDER — LOSARTAN POTASSIUM 100 MG/1
TABLET ORAL
Qty: 90 TABLET | Refills: 2 | Status: SHIPPED | OUTPATIENT
Start: 2023-08-28 | End: 2024-08-23

## 2023-08-28 NOTE — TELEPHONE ENCOUNTER
Daughter, consent to communicate on file. Patient is currently out of medication.   Patient had medicare wellness in May 5/31/23.. Daughter is hoping medications can be refilled for more than 1 refill at a time.   Jyoti Carlos RN

## 2023-10-26 ENCOUNTER — OFFICE VISIT (OUTPATIENT)
Dept: URGENT CARE | Facility: URGENT CARE | Age: 73
End: 2023-10-26
Payer: COMMERCIAL

## 2023-10-26 ENCOUNTER — ANCILLARY PROCEDURE (OUTPATIENT)
Dept: GENERAL RADIOLOGY | Facility: CLINIC | Age: 73
End: 2023-10-26
Attending: PHYSICIAN ASSISTANT
Payer: COMMERCIAL

## 2023-10-26 VITALS
HEART RATE: 63 BPM | OXYGEN SATURATION: 96 % | BODY MASS INDEX: 39.31 KG/M2 | TEMPERATURE: 97.5 F | SYSTOLIC BLOOD PRESSURE: 161 MMHG | DIASTOLIC BLOOD PRESSURE: 113 MMHG | WEIGHT: 229 LBS | RESPIRATION RATE: 20 BRPM

## 2023-10-26 DIAGNOSIS — R06.02 SOB (SHORTNESS OF BREATH): ICD-10-CM

## 2023-10-26 DIAGNOSIS — J18.9 PNEUMONIA OF RIGHT LOWER LOBE DUE TO INFECTIOUS ORGANISM: Primary | ICD-10-CM

## 2023-10-26 DIAGNOSIS — I10 ESSENTIAL HYPERTENSION: ICD-10-CM

## 2023-10-26 DIAGNOSIS — I77.810 AORTIC ROOT DILATATION (H): ICD-10-CM

## 2023-10-26 PROCEDURE — 99214 OFFICE O/P EST MOD 30 MIN: CPT

## 2023-10-26 PROCEDURE — 71046 X-RAY EXAM CHEST 2 VIEWS: CPT | Mod: TC | Performed by: RADIOLOGY

## 2023-10-26 RX ORDER — ALBUTEROL SULFATE 90 UG/1
2 AEROSOL, METERED RESPIRATORY (INHALATION) EVERY 6 HOURS PRN
Qty: 18 G | Refills: 0 | Status: SHIPPED | OUTPATIENT
Start: 2023-10-26 | End: 2023-11-07

## 2023-10-26 RX ORDER — PREDNISONE 20 MG/1
40 TABLET ORAL DAILY
Qty: 10 TABLET | Refills: 0 | Status: SHIPPED | OUTPATIENT
Start: 2023-10-26 | End: 2023-10-31

## 2023-10-26 RX ORDER — AZITHROMYCIN 250 MG/1
TABLET, FILM COATED ORAL
Qty: 6 TABLET | Refills: 0 | Status: SHIPPED | OUTPATIENT
Start: 2023-10-26 | End: 2023-10-31

## 2023-10-26 RX ORDER — BENZONATATE 100 MG/1
CAPSULE ORAL
Qty: 45 CAPSULE | Refills: 0 | Status: SHIPPED | OUTPATIENT
Start: 2023-10-26

## 2023-10-26 NOTE — PROGRESS NOTES
Chief Complaint   Patient presents with    Urgent Care     Cough, wheezing SOB  for 3 weeks -        ASSESSMENT/PLAN:  Arline was seen today for urgent care.    Diagnoses and all orders for this visit:    Pneumonia of right lower lobe due to infectious organism  -     Primary Care Referral; Future  -     benzonatate (TESSALON) 100 MG capsule; Take 1-2 capsules by mouth up to 3 x a day as needed with food  -     albuterol (PROAIR HFA/PROVENTIL HFA/VENTOLIN HFA) 108 (90 Base) MCG/ACT inhaler; Inhale 2 puffs into the lungs every 6 hours as needed for shortness of breath or wheezing  -     predniSONE (DELTASONE) 20 MG tablet; Take 2 tablets (40 mg) by mouth daily for 5 days  -     amoxicillin-clavulanate (AUGMENTIN) 875-125 MG tablet; Take 1 tablet by mouth 2 times daily for 10 days  -     azithromycin (ZITHROMAX) 250 MG tablet; Take 2 tablets (500 mg) by mouth daily for 1 day, THEN 1 tablet (250 mg) daily for 4 days.    SOB (shortness of breath)  -     XR Chest 2 Views; Future    Essential hypertension  -     Primary Care Referral; Future    Aortic root dilatation (H24)  -     Primary Care Referral; Future    Patient's x-ray, length of symptoms and history are all concerning for pneumonia.  She is also quite wheezy.  Restart albuterol, prednisone burst, azithromycin and Augmentin so she is a high risk patient with pneumonia.  Follow-up with PCP in 2 weeks to make sure she is healing appropriately.  Also needs to be evaluated again to make sure her blood pressure is adequately controlled given her aortic root dilation    Jesus Garcia PA-C      SUBJECTIVE:  Arline is a 73 year old female who presents to urgent care with 3 weeks of cough that is progressively worsening, wheezing, shortness of breath.  No fevers.  Does have fatigue, malaise.  Is a former smoker and did not quite meet requirements for COPD exacerbation but often will have wheezing associated with lung infections.    Her daughter is on the phone who  translated for her    ROS: Pertinent ROS neg other than the symptoms noted above in the HPI.     OBJECTIVE:  BP (!) 118/117   Pulse 79   Temp 97.5  F (36.4  C) (Tympanic)   Resp 20   Wt 103.9 kg (229 lb)   SpO2 91%   BMI 39.31 kg/m     GENERAL: healthy, alert and no distress  EYES: Eyes grossly normal to inspection, PERRL and conjunctivae and sclerae normal  HENT: nose and mouth without ulcers or lesions, sores  RESP: Rhonchorous cough, wheeze in the upper lobes, rhonchi in the lower lobes with right lower lobe crackles  CV: regular rate and rhythm, normal S1 S2, no S3 or S4, no murmur, click or rub  DIAGNOSTICS  Xray - Reviewed and interpreted by me.  Ill-defined opacity of the right lower lobe, cardiomegaly  No results found for any visits on 10/26/23.     Current Outpatient Medications   Medication    albuterol (PROAIR HFA/PROVENTIL HFA/VENTOLIN HFA) 108 (90 Base) MCG/ACT inhaler    bisoprolol (ZEBETA) 10 MG tablet    losartan (COZAAR) 100 MG tablet    omeprazole (PRILOSEC) 20 MG DR capsule    simvastatin (ZOCOR) 10 MG tablet     No current facility-administered medications for this visit.      Patient Active Problem List   Diagnosis    Essential hypertension    Obesity (BMI 35.0-39.9) with comorbidity (H)    Aortic root dilatation (H24)    External hemorrhoids    Chronic kidney disease, stage 3 (H)      Past Medical History:   Diagnosis Date    Essential hypertension 2019    Obesity (BMI 35.0-39.9) with comorbidity (H) 2019     Past Surgical History:   Procedure Laterality Date    CHOLECYSTECTOMY       Family History   Problem Relation Age of Onset    Cerebrovascular Disease Mother     Cerebrovascular Disease Sister 40    Unknown/Adopted Father      Social History     Tobacco Use    Smoking status: Former     Types: Cigarettes     Quit date: 2018     Years since quittin.6    Smokeless tobacco: Never    Tobacco comments:     Smoked a few cigarettes every day for 40 years.   Substance  Use Topics    Alcohol use: No              The plan of care was discussed with the patient. They understand and agree with the course of treatment prescribed. A printed summary was given including instructions and medications.  The use of Dragon/[employer] dictation services may have been used to construct the content in this note; any grammatical or spelling errors are non-intentional. Please contact the author of this note directly if you are in need of any clarification.

## 2023-11-07 ENCOUNTER — OFFICE VISIT (OUTPATIENT)
Dept: FAMILY MEDICINE | Facility: CLINIC | Age: 73
End: 2023-11-07
Attending: PHYSICIAN ASSISTANT
Payer: COMMERCIAL

## 2023-11-07 VITALS
OXYGEN SATURATION: 98 % | HEART RATE: 54 BPM | BODY MASS INDEX: 39.32 KG/M2 | WEIGHT: 236 LBS | HEIGHT: 65 IN | DIASTOLIC BLOOD PRESSURE: 104 MMHG | TEMPERATURE: 96.7 F | RESPIRATION RATE: 18 BRPM | SYSTOLIC BLOOD PRESSURE: 160 MMHG

## 2023-11-07 DIAGNOSIS — J18.9 PNEUMONIA OF RIGHT LOWER LOBE DUE TO INFECTIOUS ORGANISM: Primary | ICD-10-CM

## 2023-11-07 DIAGNOSIS — N18.31 STAGE 3A CHRONIC KIDNEY DISEASE (H): ICD-10-CM

## 2023-11-07 DIAGNOSIS — J45.909 ALLERGIC BRONCHITIS WITHOUT COMPLICATION: ICD-10-CM

## 2023-11-07 DIAGNOSIS — I10 ESSENTIAL HYPERTENSION: ICD-10-CM

## 2023-11-07 DIAGNOSIS — I77.810 AORTIC ROOT DILATATION (H): ICD-10-CM

## 2023-11-07 DIAGNOSIS — J45.41 MODERATE PERSISTENT ASTHMA WITH EXACERBATION: ICD-10-CM

## 2023-11-07 DIAGNOSIS — R07.9 CHEST PAIN, UNSPECIFIED TYPE: ICD-10-CM

## 2023-11-07 LAB
ALBUMIN SERPL BCG-MCNC: 3.9 G/DL (ref 3.5–5.2)
ALP SERPL-CCNC: 77 U/L (ref 35–104)
ALT SERPL W P-5'-P-CCNC: 40 U/L (ref 0–50)
ANION GAP SERPL CALCULATED.3IONS-SCNC: 9 MMOL/L (ref 7–15)
AST SERPL W P-5'-P-CCNC: 32 U/L (ref 0–45)
BILIRUB SERPL-MCNC: 1.1 MG/DL
BUN SERPL-MCNC: 20.5 MG/DL (ref 8–23)
CALCIUM SERPL-MCNC: 9 MG/DL (ref 8.8–10.2)
CHLORIDE SERPL-SCNC: 109 MMOL/L (ref 98–107)
CREAT SERPL-MCNC: 0.86 MG/DL (ref 0.51–0.95)
DEPRECATED HCO3 PLAS-SCNC: 24 MMOL/L (ref 22–29)
EGFRCR SERPLBLD CKD-EPI 2021: 71 ML/MIN/1.73M2
GLUCOSE SERPL-MCNC: 90 MG/DL (ref 70–99)
POTASSIUM SERPL-SCNC: 4 MMOL/L (ref 3.4–5.3)
PROT SERPL-MCNC: 7 G/DL (ref 6.4–8.3)
SODIUM SERPL-SCNC: 142 MMOL/L (ref 135–145)

## 2023-11-07 PROCEDURE — 99215 OFFICE O/P EST HI 40 MIN: CPT | Performed by: INTERNAL MEDICINE

## 2023-11-07 PROCEDURE — 93000 ELECTROCARDIOGRAM COMPLETE: CPT | Performed by: INTERNAL MEDICINE

## 2023-11-07 PROCEDURE — 36415 COLL VENOUS BLD VENIPUNCTURE: CPT | Performed by: INTERNAL MEDICINE

## 2023-11-07 PROCEDURE — 80053 COMPREHEN METABOLIC PANEL: CPT | Performed by: INTERNAL MEDICINE

## 2023-11-07 RX ORDER — PREDNISONE 10 MG/1
TABLET ORAL
Qty: 20 TABLET | Refills: 0 | Status: SHIPPED | OUTPATIENT
Start: 2023-11-07 | End: 2024-06-06

## 2023-11-07 RX ORDER — ALBUTEROL SULFATE 90 UG/1
2 AEROSOL, METERED RESPIRATORY (INHALATION) EVERY 6 HOURS PRN
Qty: 18 G | Refills: 0 | Status: SHIPPED | OUTPATIENT
Start: 2023-11-07 | End: 2023-11-22

## 2023-11-07 RX ORDER — GUAIFENESIN 200 MG/10ML
200 LIQUID ORAL EVERY 4 HOURS PRN
Qty: 236 ML | Refills: 1 | Status: SHIPPED | OUTPATIENT
Start: 2023-11-07

## 2023-11-07 RX ORDER — AMLODIPINE BESYLATE 5 MG/1
5 TABLET ORAL DAILY
Qty: 90 TABLET | Refills: 1 | Status: SHIPPED | OUTPATIENT
Start: 2023-11-07 | End: 2024-05-20

## 2023-11-07 RX ORDER — IPRATROPIUM BROMIDE AND ALBUTEROL SULFATE 2.5; .5 MG/3ML; MG/3ML
1 SOLUTION RESPIRATORY (INHALATION) EVERY 6 HOURS PRN
Qty: 90 ML | Refills: 1 | Status: SHIPPED | OUTPATIENT
Start: 2023-11-07 | End: 2023-11-22

## 2023-11-07 RX ORDER — FLUTICASONE PROPIONATE AND SALMETEROL 250; 50 UG/1; UG/1
1 POWDER RESPIRATORY (INHALATION) EVERY 12 HOURS
Qty: 60 EACH | Refills: 1 | Status: SHIPPED | OUTPATIENT
Start: 2023-11-07 | End: 2023-11-22

## 2023-11-07 ASSESSMENT — PAIN SCALES - GENERAL: PAINLEVEL: SEVERE PAIN (6)

## 2023-11-07 NOTE — PROGRESS NOTES
Assessment and Plan  1. Pneumonia of right lower lobe due to infectious organism  Recent urgent care visit on October 26, 2023 with RLL pneumonia on 10/26/23 with Rx of Augmentin for 10 days, Z-prashant and prednisone. Pt is here for follow up. Does have risk factors of Ao root dilatation - Echo And CKD.  Please see below multiple additional concerns along with physical exam findings and further plan as detailed in patient AVS.  ER precautions given.    - Since it is hardly 2 weeks from patient diagnosis of pneumonia and given the physical exam findings with patient improvement there is no acute need of repeat chest x-ray at this time which patient understood and agreed with the plan.  - Primary Care Referral  - albuterol (PROAIR HFA/PROVENTIL HFA/VENTOLIN HFA) 108 (90 Base) MCG/ACT inhaler; Inhale 2 puffs into the lungs every 6 hours as needed for shortness of breath or wheezing  Dispense: 18 g; Refill: 0    2. Chest pain, unspecified type  New problem, EKG done in the office today was showing sinus bradycardia which was also seen in the past.  No acute ST-T changes.  Emphasized on strict control of blood pressure which patient has been having uncontrolled blood pressure persistently.  - EKG 12-lead complete w/read - Clinics    3. Essential hypertension  Uncontrolled, in spite of current losartan 100 mg daily and bisoprolol 10 mg daily.  Will avoid further beta-blockers given the bradycardia, added amlodipine for better control of her blood pressure with diastolic also elevated.  We will start at 5 mg amlodipine, emphasized to [facility] further follow-up.  - Primary Care Referral  - amLODIPine (NORVASC) 5 MG tablet; Take 1 tablet (5 mg) by mouth daily  Dispense: 90 tablet; Refill: 1  - Comprehensive metabolic panel (BMP + Alb, Alk Phos, ALT, AST, Total. Bili, TP); Future  - Comprehensive metabolic panel (BMP + Alb, Alk Phos, ALT, AST, Total. Bili, TP)    4. Aortic root dilatation (H24)  Noted, will  consider doing echocardiogram in patient upcoming follow-up appointment with me in 2 weeks.  - Primary Care Referral    5. Stage 3a chronic kidney disease (H)  Emphasized to get the renal function which patient is due at this time.    6. Allergic bronchitis without complication  Ongoing problem with recent urgent care visit as mentioned above, given possible underlying asthma which is causing her restricted breath sounds bilaterally will continue prednisone taper for 8 days which patient understood and agreed with the plan.  Added cough syrup and also nebulizer supplies with machine.  - predniSONE (DELTASONE) 10 MG tablet; 4 tabs daily for 2 days, then 3 tabs daily for 2 days, then 2 tabs daily for 2 days, then 1 tab daily for 2 days, then stop  Dispense: 20 tablet; Refill: 0  - guaiFENesin (ROBITUSSIN) 20 mg/mL liquid; Take 10 mLs (200 mg) by mouth every 4 hours as needed for cough  Dispense: 236 mL; Refill: 1  - Nebulizer and Supplies Order for DME - ONLY FOR DME    7. Moderate persistent asthma with exacerbation  New problem added to patient problem list given patient's recent PFTs done in August 2023 negative for COPD.  Emphasized on possible underlying asthma which is causing her ongoing breathing issues, will start on Advair discus and also added as needed albuterol emphasized to take it every 4 hours as needed and also nebulizer supplies.  Strict ER precautions given along with red flag symptoms which patient understood and agreed with the plan.  - Nebulizer and Supplies Order for DME - ONLY FOR DME  - ipratropium - albuterol 0.5 mg/2.5 mg/3 mL (DUONEB) 0.5-2.5 (3) MG/3ML neb solution; Take 1 vial (3 mLs) by nebulization every 6 hours as needed for shortness of breath, wheezing or cough  Dispense: 90 mL; Refill: 1  - albuterol (PROAIR HFA/PROVENTIL HFA/VENTOLIN HFA) 108 (90 Base) MCG/ACT inhaler; Inhale 2 puffs into the lungs every 6 hours as needed for shortness of breath or wheezing  Dispense: 18 g; Refill:  0  - predniSONE (DELTASONE) 10 MG tablet; 4 tabs daily for 2 days, then 3 tabs daily for 2 days, then 2 tabs daily for 2 days, then 1 tab daily for 2 days, then stop  Dispense: 20 tablet; Refill: 0  - fluticasone-salmeterol (ADVAIR) 250-50 MCG/ACT inhaler; Inhale 1 puff into the lungs every 12 hours  Dispense: 60 each; Refill: 1  - guaiFENesin (ROBITUSSIN) 20 mg/mL liquid; Take 10 mLs (200 mg) by mouth every 4 hours as needed for cough  Dispense: 236 mL; Refill: 1  - Nebulizer and Supplies Order for DME - ONLY FOR DME       Daughter Jessica in room helping Filipino interpretation and conversation with the mother.    Over 40 minutes spent on reviewing patient chart,  face to face encounter, greater than 50% time spent with plan/cordination of care and documentation as above in my A/P.          Please note that this note consists of symbols derived from keyboarding, dictation and/or voice recognition software. As a result, there may be errors in the script that have gone undetected. Please consider this when interpreting information found in this chart.    Patient Instructions   As discussed , since you have completed the 2 antibiotics given by  already - This appears as Asthma exacerbation with no fever or chills >> Will need to treat with Nebulizer treatments , Albuterol inhaler and prolonged Prednisone taper.     Your EKG is not showing any concerns , but any worsening chest pain go to ER    Added BP medication in addition to the current 2 medications    As discussed , please go to ER for any worsening symptoms of SOB and Chest pain    ============================          Return in about 2 weeks (around 11/21/2023), or if symptoms worsen or fail to improve.    Amber Raya MD  [facility] [place] is a 73 year old, presenting for the following health issues:  Urgent Care (Follow up/)        11/7/2023     3:00 PM   Additional Questions   Roomed by Kimberley AZEVEDO        ED/UC Followup:    Facility:  [facility]   Date of visit: 10/26/23  Reason for visit: Pneumonia of right lower lobe due to infectious organism   Current Status: doing better but not great       Last seen pt on 2023 for AWV at that time. She is here  follow up. Does have uncontrolled BP at . Will need recheck and adjustment of medications.       No Known Allergies     Past Medical History:   Diagnosis Date    Essential hypertension 2019    Obesity (BMI 35.0-39.9) with comorbidity (H) 2019       Past Surgical History:   Procedure Laterality Date    CHOLECYSTECTOMY         Family History   Problem Relation Age of Onset    Cerebrovascular Disease Mother     Cerebrovascular Disease Sister 40    Unknown/Adopted Father        Social History     Tobacco Use    Smoking status: Former     Types: Cigarettes     Quit date: 2018     Years since quittin.6    Smokeless tobacco: Never    Tobacco comments:     Smoked a few cigarettes every day for 40 years.   Substance Use Topics    Alcohol use: No        Current Outpatient Medications   Medication    albuterol (PROAIR HFA/PROVENTIL HFA/VENTOLIN HFA) 108 (90 Base) MCG/ACT inhaler    amLODIPine (NORVASC) 5 MG tablet    fluticasone-salmeterol (ADVAIR) 250-50 MCG/ACT inhaler    guaiFENesin (ROBITUSSIN) 20 mg/mL liquid    ipratropium - albuterol 0.5 mg/2.5 mg/3 mL (DUONEB) 0.5-2.5 (3) MG/3ML neb solution    predniSONE (DELTASONE) 10 MG tablet    albuterol (PROAIR HFA/PROVENTIL HFA/VENTOLIN HFA) 108 (90 Base) MCG/ACT inhaler    benzonatate (TESSALON) 100 MG capsule    bisoprolol (ZEBETA) 10 MG tablet    losartan (COZAAR) 100 MG tablet    omeprazole (PRILOSEC) 20 MG DR capsule    simvastatin (ZOCOR) 10 MG tablet     No current facility-administered medications for this visit.        Review of Systems   Constitutional, HEENT, cardiovascular, pulmonary, GI, , musculoskeletal, neuro, skin, endocrine and psych systems are negative,  "except as otherwise noted.      Objective    BP (!) 160/104   Pulse 54   Temp (!) 96.7  F (35.9  C) (Tympanic)   Resp 18   Ht 1.64 m (5' 4.57\")   Wt 107 kg (236 lb)   SpO2 98%   BMI 39.80 kg/m    Body mass index is 39.8 kg/m .  Physical Exam   GENERAL: healthy, alert and no distress  NECK: no adenopathy, no asymmetry, masses, or scars and thyroid normal to palpation  RESP: Restricted BS bilaterally , no other extra sounds heard.  CV: regular rate and rhythm, normal S1 S2, no S3 or S4, no murmur, click or rub, no peripheral edema and peripheral pulses strong  ABDOMEN: soft, nontender, no hepatosplenomegaly, no masses and bowel sounds normal  MS: no gross musculoskeletal defects noted, no edema        "

## 2023-11-07 NOTE — PATIENT INSTRUCTIONS
As discussed , since you have completed the 2 antibiotics given by  already - This appears as Asthma exacerbation with no fever or chills >> Will need to treat with Nebulizer treatments , Albuterol inhaler and prolonged Prednisone taper.     Your EKG is not showing any concerns , but any worsening chest pain go to ER    Added BP medication in addition to the current 2 medications    As discussed , please go to ER for any worsening symptoms of SOB and Chest pain    ============================           [Procedure: _________] : a [unfilled] procedure visit [FreeTextEntry1] : Diagnosis: Left Breast Cancer

## 2023-11-08 RX ORDER — FLUTICASONE PROPIONATE AND SALMETEROL 250; 50 UG/1; UG/1
1 POWDER RESPIRATORY (INHALATION) EVERY 12 HOURS
OUTPATIENT
Start: 2023-11-08

## 2023-11-10 ENCOUNTER — TELEPHONE (OUTPATIENT)
Dept: FAMILY MEDICINE | Facility: CLINIC | Age: 73
End: 2023-11-10
Payer: COMMERCIAL

## 2023-11-10 NOTE — TELEPHONE ENCOUNTER
Pt's daughter calling because insurance is not willing to cover the cost of fluticasone-salmeterol (ADVAIR) 250-50 MCG/ACT inhaler. Pt would like to start a prior authorization for medication.    Lauren Sanchez,  [facility]

## 2023-11-14 ENCOUNTER — TELEPHONE (OUTPATIENT)
Dept: FAMILY MEDICINE | Facility: CLINIC | Age: 73
End: 2023-11-14
Payer: COMMERCIAL

## 2023-11-14 NOTE — TELEPHONE ENCOUNTER
Reason for Call:  Appointment Request    Patient requesting this type of appt:  pneumonia follow up    Requested provider: Amber Raya MD     Reason patient unable to be scheduled: Not within requested timeframe    When does patient want to be seen/preferred time:  Nov 16 or 22    Comments: pts daughter is wondering if Dr Raya is able to see pt 11/16 or 11/22 instead of 11/21. If not she will keep 11/21. She doesn't want to have to send pt to appt alone on the 21st.    Could we send this information to you in [facility] or would you prefer to receive a phone call?:   Patient would prefer a phone call   Okay to leave a detailed message?: Yes at Other phone number:  [phone] Jessica pts daughter    Call taken on 11/14/2023 at 12:07 PM by Hyacinth Reyes

## 2023-11-15 NOTE — TELEPHONE ENCOUNTER
Central Prior Authorization Team   Phone: [phone]    PA Initiation    Medication: FLUTICASONE-SALMETEROL 250-50 MCG/ACT IN AEPB  Insurance Company: [employer] - Phone [phone] Fax [phone]  Pharmacy Filling the Rx: [id number] - [place] - [address] AT [facility] [address] [place] [address]  Filling Pharmacy Phone: [phone]  Filling Pharmacy Fax:    Start Date: 11/15/2023

## 2023-11-16 NOTE — TELEPHONE ENCOUNTER
4pm virtual slot on 11/22/23 is now taken. Any recommendations on when to schedule pt? Please advise.    Lauren Sanchez,  [facility]

## 2023-11-17 NOTE — TELEPHONE ENCOUNTER
Spoke to patient's daughter and scheduled patient on 11/22/23 at 8:00am per Dr Raya.  Darlyn Stiles MA on 11/17/2023 at 3:05 PM

## 2023-11-22 ENCOUNTER — OFFICE VISIT (OUTPATIENT)
Dept: FAMILY MEDICINE | Facility: CLINIC | Age: 73
End: 2023-11-22
Payer: COMMERCIAL

## 2023-11-22 VITALS
WEIGHT: 236 LBS | HEART RATE: 59 BPM | TEMPERATURE: 97.7 F | BODY MASS INDEX: 37.93 KG/M2 | OXYGEN SATURATION: 97 % | RESPIRATION RATE: 15 BRPM | DIASTOLIC BLOOD PRESSURE: 88 MMHG | SYSTOLIC BLOOD PRESSURE: 138 MMHG | HEIGHT: 66 IN

## 2023-11-22 DIAGNOSIS — J18.9 PNEUMONIA OF RIGHT LOWER LOBE DUE TO INFECTIOUS ORGANISM: ICD-10-CM

## 2023-11-22 DIAGNOSIS — J45.40 MODERATE PERSISTENT ASTHMA WITHOUT COMPLICATION: Primary | ICD-10-CM

## 2023-11-22 PROCEDURE — 99214 OFFICE O/P EST MOD 30 MIN: CPT | Performed by: INTERNAL MEDICINE

## 2023-11-22 RX ORDER — IPRATROPIUM BROMIDE AND ALBUTEROL SULFATE 2.5; .5 MG/3ML; MG/3ML
1 SOLUTION RESPIRATORY (INHALATION) EVERY 6 HOURS PRN
Qty: 90 ML | Refills: 1 | Status: SHIPPED | OUTPATIENT
Start: 2023-11-22 | End: 2024-06-06

## 2023-11-22 RX ORDER — MONTELUKAST SODIUM 10 MG/1
10 TABLET ORAL AT BEDTIME
Qty: 90 TABLET | Refills: 1 | Status: SHIPPED | OUTPATIENT
Start: 2023-11-22 | End: 2024-03-18

## 2023-11-22 RX ORDER — FLUTICASONE PROPIONATE AND SALMETEROL 500; 50 UG/1; UG/1
1 POWDER RESPIRATORY (INHALATION) EVERY 12 HOURS
Qty: 60 EACH | Refills: 1 | Status: SHIPPED | OUTPATIENT
Start: 2023-11-22 | End: 2024-06-06

## 2023-11-22 RX ORDER — FLUTICASONE PROPIONATE AND SALMETEROL 250; 50 UG/1; UG/1
1 POWDER RESPIRATORY (INHALATION) EVERY 12 HOURS
Qty: 60 EACH | Refills: 1 | Status: SHIPPED | OUTPATIENT
Start: 2023-11-22 | End: 2023-11-22

## 2023-11-22 RX ORDER — ALBUTEROL SULFATE 90 UG/1
2 AEROSOL, METERED RESPIRATORY (INHALATION) EVERY 6 HOURS PRN
Qty: 18 G | Refills: 0 | Status: SHIPPED | OUTPATIENT
Start: 2023-11-22 | End: 2024-06-06

## 2023-11-22 ASSESSMENT — ENCOUNTER SYMPTOMS: COUGH: 1

## 2023-11-22 ASSESSMENT — ASTHMA QUESTIONNAIRES: ACT_TOTALSCORE: 12

## 2023-11-22 ASSESSMENT — PAIN SCALES - GENERAL: PAINLEVEL: MILD PAIN (2)

## 2023-11-22 NOTE — PROGRESS NOTES
Assessment and Plan  1. Moderate persistent asthma without complication  Uncontrolled, patient does endorses that her coughing spells are still present.  Emphasized on using the nebulizer machine she has sent in the refills of nebulization liquid, increase the dose of Advair to 500 and also added Singulair for improvement.  Patient understood and agreed to plan.  -Discussed on the past PFTs done for her which are all showing normal and also have explained to her that she does not have COPD but and asthma the PFTs remain normal as we did not trigger with the methacholine challenge test given her age of 73 so this will be the diagnosis of moderate persistent asthma emphasized and explained to the patient and daughter.  - ipratropium - albuterol 0.5 mg/2.5 mg/3 mL (DUONEB) 0.5-2.5 (3) MG/3ML neb solution; Take 1 vial (3 mLs) by nebulization every 6 hours as needed for shortness of breath, wheezing or cough  Dispense: 90 mL; Refill: 1  - albuterol (PROAIR HFA/PROVENTIL HFA/VENTOLIN HFA) 108 (90 Base) MCG/ACT inhaler; Inhale 2 puffs into the lungs every 6 hours as needed for shortness of breath or wheezing  Dispense: 18 g; Refill: 0  - fluticasone-salmeterol (ADVAIR) 500-50 MCG/ACT inhaler; Inhale 1 puff into the lungs every 12 hours  Dispense: 60 each; Refill: 1  - montelukast (SINGULAIR) 10 MG tablet; Take 1 tablet (10 mg) by mouth at bedtime  Dispense: 90 tablet; Refill: 1    2. Pneumonia of right lower lobe due to infectious organism  Last urgent care visit on October 26, 2023 showing pneumonia of the right lower lobe for which she was given antibiotic and also prednisone with a prolonged taper given by me again much improved symptoms at this time.  No need for repeating the imaging at this time as patient is completely improved though she has intermittent triggering coughing spells due to bronchospasm as mentioned above.      Daughter Jessica in the room helping and discussion with Taiwanese translation.      Please  note that this note consists of symbols derived from keyboarding, dictation and/or voice recognition software. As a result, there may be errors in the script that have gone undetected. Please consider this when interpreting information found in this chart.    Patient Instructions   As discussed , will increase the dose of Advair diskus along with additional Albuterol inhaler as needed.   Refills done for nebulization.  Will start on Singulair for improvement.   Return in 3 months (on 2024), or if symptoms worsen or fail to improve, for Follow up of last visit, If symptoms persist.    Amber Raya MD  [facility] [place] Nunn is a 73 year old, presenting for the following health issues:  Cough (Follow up Pneumonia )        2023     7:48 AM   Additional Questions   Roomed by Susan FREEDMAN   Accompanied by Daughter       History of Present Illness       Reason for visit:  Follow up for cough    She eats 0-1 servings of fruits and vegetables daily.She consumes 5 sweetened beverage(s) daily.She exercises with enough effort to increase her heart rate 9 or less minutes per day.  She exercises with enough effort to increase her heart rate 3 or less days per week.   She is taking medications regularly.      No Known Allergies     Past Medical History:   Diagnosis Date    Essential hypertension 2019    Obesity (BMI 35.0-39.9) with comorbidity (H) 2019       Past Surgical History:   Procedure Laterality Date    CHOLECYSTECTOMY         Family History   Problem Relation Age of Onset    Cerebrovascular Disease Mother     Cerebrovascular Disease Sister 40    Unknown/Adopted Father        Social History     Tobacco Use    Smoking status: Former     Types: Cigarettes     Quit date: 2018     Years since quittin.7    Smokeless tobacco: Never    Tobacco comments:     Smoked a few cigarettes every day for 40 years.   Substance Use Topics    Alcohol use: No     "    Current Outpatient Medications   Medication    albuterol (PROAIR HFA/PROVENTIL HFA/VENTOLIN HFA) 108 (90 Base) MCG/ACT inhaler    fluticasone-salmeterol (ADVAIR) 500-50 MCG/ACT inhaler    ipratropium - albuterol 0.5 mg/2.5 mg/3 mL (DUONEB) 0.5-2.5 (3) MG/3ML neb solution    montelukast (SINGULAIR) 10 MG tablet    albuterol (PROAIR HFA/PROVENTIL HFA/VENTOLIN HFA) 108 (90 Base) MCG/ACT inhaler    amLODIPine (NORVASC) 5 MG tablet    benzonatate (TESSALON) 100 MG capsule    bisoprolol (ZEBETA) 10 MG tablet    guaiFENesin (ROBITUSSIN) 20 mg/mL liquid    losartan (COZAAR) 100 MG tablet    omeprazole (PRILOSEC) 20 MG DR capsule    predniSONE (DELTASONE) 10 MG tablet    simvastatin (ZOCOR) 10 MG tablet     No current facility-administered medications for this visit.      Review of Systems   Respiratory:  Positive for cough.       Constitutional, HEENT, cardiovascular, pulmonary, GI, , musculoskeletal, neuro, skin, endocrine and psych systems are negative, except as otherwise noted.      Objective    /88   Pulse 59   Temp 97.7  F (36.5  C) (Tympanic)   Resp 15   Ht 1.665 m (5' 5.55\")   Wt 107 kg (236 lb)   SpO2 97%   BMI 38.62 kg/m    Body mass index is 38.62 kg/m .  Physical Exam   GENERAL: healthy, alert and no distress  NECK: no adenopathy, no asymmetry, masses, or scars and thyroid normal to palpation  RESP: lungs clear to auscultation - no rales, rhonchi or wheezes  CV: regular rate and rhythm, normal S1 S2, no S3 or S4, no murmur, click or rub, no peripheral edema and peripheral pulses strong  ABDOMEN: soft, nontender, no hepatosplenomegaly, no masses and bowel sounds normal  MS: no gross musculoskeletal defects noted, no edema      "

## 2023-11-22 NOTE — TELEPHONE ENCOUNTER
Prior Authorization Not Needed per Insurance    Medication: FLUTICASONE-SALMETEROL 250-50 MCG/ACT IN AEPB  Insurance Company: [employer] - Phone [phone] Fax [phone]  Expected CoPay: $    Pharmacy Filling the Rx: [id number] - [place], [place] - [address] AT [facility] OF [place] [address]  Pharmacy Notified: Yes  Patient Notified: **Instructed pharmacy to notify patient when script is ready to /ship.**    Ada from "[employer] called stating drug does not need PA. Brand Advair is covered.

## 2023-11-22 NOTE — PATIENT INSTRUCTIONS
As discussed , will increase the dose of Advair diskus along with additional Albuterol inhaler as needed.   Refills done for nebulization.  Will start on Singulair for improvement.

## 2023-11-24 ENCOUNTER — TELEPHONE (OUTPATIENT)
Dept: FAMILY MEDICINE | Facility: CLINIC | Age: 73
End: 2023-11-24
Payer: COMMERCIAL

## 2023-11-29 NOTE — TELEPHONE ENCOUNTER
Prior Authorization Not Needed per Insurance    Medication: FLUTICASONE-SALMETEROL 500-50 MCG/ACT IN AEPB  Insurance Company: [employer] - Phone [phone] Fax [phone]  Expected CoPay: $    Pharmacy Filling the Rx: [id number] - [place] - [address] AT [facility] OF [place] [address]  Pharmacy Notified: y  Patient Notified: y    Plan pays for BRAND name without PA.

## 2024-01-16 ENCOUNTER — TELEPHONE (OUTPATIENT)
Dept: FAMILY MEDICINE | Facility: CLINIC | Age: 74
End: 2024-01-16
Payer: COMMERCIAL

## 2024-01-16 NOTE — TELEPHONE ENCOUNTER
Prior Authorization Retail Medication Request    Medication/Dose: albuterol (PROAIR HFA/PROVENTIL HFA/VENTOLIN HFA) 108 (90 Base) MCG/ACT inhaler  Diagnosis and ICD code (if different than what is on RX):    New/renewal/insurance change PA/secondary ins. PA:  Previously Tried and Failed:    Rationale:      Insurance   Primary: [id number]  Insurance ID:      Secondary (if applicable):  Insurance ID:     Pharmacy Information (if different than what is on RX)  Name:  same  Phone:  [phone]  Fax:

## 2024-01-24 NOTE — TELEPHONE ENCOUNTER
Prior Authorization Not Needed per Insurance    Medication: albuterol (PROAIR HFA/PROVENTIL HFA/VENTOLIN HFA) 108 (90 Base) MCG/ACT inhaler  Insurance Company: [employer] - Phone [phone] Fax [phone]  Expected CoPay:      Pharmacy Filling the Rx: [facility] #[id number] - [place], [place] - [address] AT [facility] OF [place] [address]  Pharmacy Notified:  Yes  Patient Notified:  No    Pharmacy provided list of covered inhalers told to use one of those or call clinic for a new rx for one that is covered since Rx states it can be changed.

## 2024-03-15 DIAGNOSIS — I10 ESSENTIAL HYPERTENSION: ICD-10-CM

## 2024-03-15 DIAGNOSIS — J45.40 MODERATE PERSISTENT ASTHMA WITHOUT COMPLICATION: ICD-10-CM

## 2024-03-18 RX ORDER — AMLODIPINE BESYLATE 5 MG/1
5 TABLET ORAL DAILY
Qty: 90 TABLET | Refills: 1 | OUTPATIENT
Start: 2024-03-18

## 2024-03-18 RX ORDER — MONTELUKAST SODIUM 10 MG/1
1 TABLET ORAL AT BEDTIME
Qty: 90 TABLET | Refills: 0 | Status: SHIPPED | OUTPATIENT
Start: 2024-03-18 | End: 2024-06-06

## 2024-03-20 NOTE — TELEPHONE ENCOUNTER
Spoke to pt and pts daughter and helped schedule appointment for 6/6/24 with PCP.     Kimberley Taylor RN on 3/20/2024 at 8:26 AM

## 2024-05-05 ENCOUNTER — HEALTH MAINTENANCE LETTER (OUTPATIENT)
Age: 74
End: 2024-05-05

## 2024-05-17 DIAGNOSIS — I77.810 AORTIC ROOT DILATATION (H): ICD-10-CM

## 2024-05-17 DIAGNOSIS — I10 ESSENTIAL HYPERTENSION: ICD-10-CM

## 2024-05-20 RX ORDER — BISOPROLOL FUMARATE 10 MG/1
TABLET, FILM COATED ORAL
Qty: 90 TABLET | Refills: 0 | Status: SHIPPED | OUTPATIENT
Start: 2024-05-20 | End: 2024-06-06

## 2024-05-20 RX ORDER — AMLODIPINE BESYLATE 5 MG/1
5 TABLET ORAL DAILY
Qty: 90 TABLET | Refills: 0 | Status: SHIPPED | OUTPATIENT
Start: 2024-05-20 | End: 2024-08-24

## 2024-06-05 SDOH — HEALTH STABILITY: PHYSICAL HEALTH: ON AVERAGE, HOW MANY DAYS PER WEEK DO YOU ENGAGE IN MODERATE TO STRENUOUS EXERCISE (LIKE A BRISK WALK)?: 0 DAYS

## 2024-06-05 SDOH — HEALTH STABILITY: PHYSICAL HEALTH: ON AVERAGE, HOW MANY MINUTES DO YOU ENGAGE IN EXERCISE AT THIS LEVEL?: 0 MIN

## 2024-06-05 ASSESSMENT — ASTHMA QUESTIONNAIRES
QUESTION_4 LAST FOUR WEEKS HOW OFTEN HAVE YOU USED YOUR RESCUE INHALER OR NEBULIZER MEDICATION (SUCH AS ALBUTEROL): TWO OR THREE TIMES PER WEEK
QUESTION_5 LAST FOUR WEEKS HOW WOULD YOU RATE YOUR ASTHMA CONTROL: POORLY CONTROLLED
QUESTION_2 LAST FOUR WEEKS HOW OFTEN HAVE YOU HAD SHORTNESS OF BREATH: MORE THAN ONCE A DAY
QUESTION_3 LAST FOUR WEEKS HOW OFTEN DID YOUR ASTHMA SYMPTOMS (WHEEZING, COUGHING, SHORTNESS OF BREATH, CHEST TIGHTNESS OR PAIN) WAKE YOU UP AT NIGHT OR EARLIER THAN USUAL IN THE MORNING: FOUR OR MORE NIGHTS A WEEK
ACT_TOTALSCORE: 11
QUESTION_1 LAST FOUR WEEKS HOW MUCH OF THE TIME DID YOUR ASTHMA KEEP YOU FROM GETTING AS MUCH DONE AT WORK, SCHOOL OR AT HOME: A LITTLE OF THE TIME

## 2024-06-05 ASSESSMENT — SOCIAL DETERMINANTS OF HEALTH (SDOH): HOW OFTEN DO YOU GET TOGETHER WITH FRIENDS OR RELATIVES?: THREE TIMES A WEEK

## 2024-06-06 ENCOUNTER — OFFICE VISIT (OUTPATIENT)
Dept: FAMILY MEDICINE | Facility: CLINIC | Age: 74
End: 2024-06-06
Payer: COMMERCIAL

## 2024-06-06 ENCOUNTER — VIRTUAL VISIT (OUTPATIENT)
Dept: INTERPRETER SERVICES | Facility: CLINIC | Age: 74
End: 2024-06-06

## 2024-06-06 VITALS
WEIGHT: 234 LBS | SYSTOLIC BLOOD PRESSURE: 130 MMHG | HEART RATE: 58 BPM | RESPIRATION RATE: 22 BRPM | TEMPERATURE: 97.2 F | BODY MASS INDEX: 38.99 KG/M2 | OXYGEN SATURATION: 97 % | DIASTOLIC BLOOD PRESSURE: 82 MMHG | HEIGHT: 65 IN

## 2024-06-06 DIAGNOSIS — E66.01 MORBID OBESITY (H): ICD-10-CM

## 2024-06-06 DIAGNOSIS — E78.5 DYSLIPIDEMIA: ICD-10-CM

## 2024-06-06 DIAGNOSIS — K21.00 GASTROESOPHAGEAL REFLUX DISEASE WITH ESOPHAGITIS, UNSPECIFIED WHETHER HEMORRHAGE: ICD-10-CM

## 2024-06-06 DIAGNOSIS — M85.80 AGE-RELATED BONE LOSS: ICD-10-CM

## 2024-06-06 DIAGNOSIS — Z78.0 ASYMPTOMATIC MENOPAUSE: ICD-10-CM

## 2024-06-06 DIAGNOSIS — I10 ESSENTIAL HYPERTENSION: ICD-10-CM

## 2024-06-06 DIAGNOSIS — N18.31 STAGE 3A CHRONIC KIDNEY DISEASE (H): ICD-10-CM

## 2024-06-06 DIAGNOSIS — Z12.31 VISIT FOR SCREENING MAMMOGRAM: ICD-10-CM

## 2024-06-06 DIAGNOSIS — I77.810 AORTIC ROOT DILATATION (H): ICD-10-CM

## 2024-06-06 DIAGNOSIS — Z12.11 SCREEN FOR COLON CANCER: ICD-10-CM

## 2024-06-06 DIAGNOSIS — J45.40 MODERATE PERSISTENT ASTHMA WITHOUT COMPLICATION: ICD-10-CM

## 2024-06-06 DIAGNOSIS — I83.813 VARICOSE VEINS OF BOTH LOWER EXTREMITIES WITH PAIN: ICD-10-CM

## 2024-06-06 DIAGNOSIS — Z00.00 ROUTINE GENERAL MEDICAL EXAMINATION AT A HEALTH CARE FACILITY: Primary | ICD-10-CM

## 2024-06-06 PROBLEM — I67.9 CEREBROVASCULAR DISEASE: Status: ACTIVE | Noted: 2024-06-06

## 2024-06-06 PROBLEM — F43.10 POSTTRAUMATIC STRESS DISORDER: Status: ACTIVE | Noted: 2024-06-06

## 2024-06-06 LAB
ERYTHROCYTE [DISTWIDTH] IN BLOOD BY AUTOMATED COUNT: 14 % (ref 10–15)
HCT VFR BLD AUTO: 41.7 % (ref 35–47)
HGB BLD-MCNC: 14.3 G/DL (ref 11.7–15.7)
MCH RBC QN AUTO: 30.2 PG (ref 26.5–33)
MCHC RBC AUTO-ENTMCNC: 34.3 G/DL (ref 31.5–36.5)
MCV RBC AUTO: 88 FL (ref 78–100)
PLATELET # BLD AUTO: 218 10E3/UL (ref 150–450)
RBC # BLD AUTO: 4.74 10E6/UL (ref 3.8–5.2)
WBC # BLD AUTO: 6.3 10E3/UL (ref 4–11)

## 2024-06-06 PROCEDURE — 90480 ADMN SARSCOV2 VAC 1/ONLY CMP: CPT | Performed by: INTERNAL MEDICINE

## 2024-06-06 PROCEDURE — 82306 VITAMIN D 25 HYDROXY: CPT | Performed by: INTERNAL MEDICINE

## 2024-06-06 PROCEDURE — 80053 COMPREHEN METABOLIC PANEL: CPT | Performed by: INTERNAL MEDICINE

## 2024-06-06 PROCEDURE — 82570 ASSAY OF URINE CREATININE: CPT | Performed by: INTERNAL MEDICINE

## 2024-06-06 PROCEDURE — 36415 COLL VENOUS BLD VENIPUNCTURE: CPT | Performed by: INTERNAL MEDICINE

## 2024-06-06 PROCEDURE — T1013 SIGN LANG/ORAL INTERPRETER: HCPCS | Mod: U4 | Performed by: INTERPRETER

## 2024-06-06 PROCEDURE — 85027 COMPLETE CBC AUTOMATED: CPT | Performed by: INTERNAL MEDICINE

## 2024-06-06 PROCEDURE — 80061 LIPID PANEL: CPT | Performed by: INTERNAL MEDICINE

## 2024-06-06 PROCEDURE — 99397 PER PM REEVAL EST PAT 65+ YR: CPT | Performed by: INTERNAL MEDICINE

## 2024-06-06 PROCEDURE — 99214 OFFICE O/P EST MOD 30 MIN: CPT | Mod: 25 | Performed by: INTERNAL MEDICINE

## 2024-06-06 PROCEDURE — 91320 SARSCV2 VAC 30MCG TRS-SUC IM: CPT | Performed by: INTERNAL MEDICINE

## 2024-06-06 PROCEDURE — 82043 UR ALBUMIN QUANTITATIVE: CPT | Performed by: INTERNAL MEDICINE

## 2024-06-06 RX ORDER — MONTELUKAST SODIUM 10 MG/1
1 TABLET ORAL AT BEDTIME
Qty: 90 TABLET | Refills: 0 | Status: SHIPPED | OUTPATIENT
Start: 2024-06-06

## 2024-06-06 RX ORDER — IPRATROPIUM BROMIDE AND ALBUTEROL SULFATE 2.5; .5 MG/3ML; MG/3ML
1 SOLUTION RESPIRATORY (INHALATION) EVERY 6 HOURS PRN
Qty: 90 ML | Refills: 1 | Status: SHIPPED | OUTPATIENT
Start: 2024-06-06

## 2024-06-06 RX ORDER — SIMVASTATIN 10 MG
10 TABLET ORAL AT BEDTIME
Qty: 90 TABLET | Refills: 1 | Status: SHIPPED | OUTPATIENT
Start: 2024-06-06

## 2024-06-06 RX ORDER — ALBUTEROL SULFATE 90 UG/1
2 AEROSOL, METERED RESPIRATORY (INHALATION) EVERY 6 HOURS PRN
Qty: 18 G | Refills: 0 | Status: SHIPPED | OUTPATIENT
Start: 2024-06-06

## 2024-06-06 RX ORDER — FLUTICASONE PROPIONATE AND SALMETEROL 500; 50 UG/1; UG/1
1 POWDER RESPIRATORY (INHALATION) EVERY 12 HOURS
Qty: 60 EACH | Refills: 1 | Status: SHIPPED | OUTPATIENT
Start: 2024-06-06

## 2024-06-06 RX ORDER — BISOPROLOL FUMARATE 10 MG/1
10 TABLET, FILM COATED ORAL DAILY
Qty: 90 TABLET | Refills: 0 | Status: SHIPPED | OUTPATIENT
Start: 2024-06-06 | End: 2024-08-23

## 2024-06-06 ASSESSMENT — PAIN SCALES - GENERAL: PAINLEVEL: NO PAIN (0)

## 2024-06-06 NOTE — PROGRESS NOTES
Preventive Care Visit  [facility] ANAHY Raya MD, Internal Medicine  Jun 6, 2024        Assessment and Plan  1. Routine general medical examination at a health care facility    Last seen pt on 11/2023 for Asthma follow up at that time and she is here for AWV. She did have recent RLL pneumonia at that time and has on and off flareups of Allergic bronchitis. Last lab at that time showing normal BMP, CBC and dyslipidemia in 5/2023 . Will recheck at this time.     Does have CKD3 , HTN On medications as below.  Patient denies any new concerns.    - DEXA HIP/PELVIS/SPINE - Future; Future  - COVID-19 12+ (2023-24) (PFIZER)  - MA Screening Bilateral w/ Darek; Future  - Lipid panel reflex to direct LDL Non-fasting; Future  - Albumin Random Urine Quantitative with Creat Ratio; Future  - REVIEW OF HEALTH MAINTENANCE PROTOCOL ORDERS  - Fecal colorectal cancer screen FIT - Future (S+30); Future  - PRIMARY CARE FOLLOW-UP SCHEDULING; Future  - bisoprolol (ZEBETA) 10 MG tablet; Take 1 tablet (10 mg) by mouth daily  Dispense: 90 tablet; Refill: 0  - Comprehensive metabolic panel (BMP + Alb, Alk Phos, ALT, AST, Total. Bili, TP); Future  - CBC with platelets; Future  - Vitamin D Deficiency; Future  - omeprazole (PRILOSEC) 20 MG DR capsule; Take 1 capsule (20 mg) by mouth daily  Dispense: 90 capsule; Refill: 2  - ipratropium - albuterol 0.5 mg/2.5 mg/3 mL (DUONEB) 0.5-2.5 (3) MG/3ML neb solution; Take 1 vial (3 mLs) by nebulization every 6 hours as needed for shortness of breath, wheezing or cough  Dispense: 90 mL; Refill: 1  - fluticasone-salmeterol (ADVAIR) 500-50 MCG/ACT inhaler; Inhale 1 puff into the lungs every 12 hours  Dispense: 60 each; Refill: 1  - albuterol (PROAIR HFA/PROVENTIL HFA/VENTOLIN HFA) 108 (90 Base) MCG/ACT inhaler; Inhale 2 puffs into the lungs every 6 hours as needed for shortness of breath or wheezing  Dispense: 18 g; Refill: 0  - montelukast (SINGULAIR) 10 MG tablet; Take 1  tablet (10 mg) by mouth at bedtime  Dispense: 90 tablet; Refill: 0  - simvastatin (ZOCOR) 10 MG tablet; Take 1 tablet (10 mg) by mouth at bedtime  Dispense: 90 tablet; Refill: 1  - Compression Sleeve/Stocking Order for DME - ONLY FOR DME  - Lipid panel reflex to direct LDL Non-fasting  - Albumin Random Urine Quantitative with Creat Ratio  - Fecal colorectal cancer screen FIT - Future (S+30)  - Comprehensive metabolic panel (BMP + Alb, Alk Phos, ALT, AST, Total. Bili, TP)  - CBC with platelets  - Vitamin D Deficiency    2. Stage 3a chronic kidney disease (H)  - Albumin Random Urine Quantitative with Creat Ratio; Future  - CBC with platelets; Future  - Albumin Random Urine Quantitative with Creat Ratio  - CBC with platelets    3. Aortic root dilatation (H24)  - bisoprolol (ZEBETA) 10 MG tablet; Take 1 tablet (10 mg) by mouth daily  Dispense: 90 tablet; Refill: 0  - Lipid panel reflex to direct LDL Non-fasting    4. Morbid obesity (H)  Noted, patient working on diet and lifestyle modifications.    5. Varicose veins of both lower extremities with pain  Ongoing problem, uncontrolled.  Discussed on wearing compression stockings for improvement.  - Compression Sleeve/Stocking Order for DME - ONLY FOR DME    6. Moderate persistent asthma without complication  - CBC with platelets; Future  - ipratropium - albuterol 0.5 mg/2.5 mg/3 mL (DUONEB) 0.5-2.5 (3) MG/3ML neb solution; Take 1 vial (3 mLs) by nebulization every 6 hours as needed for shortness of breath, wheezing or cough  Dispense: 90 mL; Refill: 1  - fluticasone-salmeterol (ADVAIR) 500-50 MCG/ACT inhaler; Inhale 1 puff into the lungs every 12 hours  Dispense: 60 each; Refill: 1  - albuterol (PROAIR HFA/PROVENTIL HFA/VENTOLIN HFA) 108 (90 Base) MCG/ACT inhaler; Inhale 2 puffs into the lungs every 6 hours as needed for shortness of breath or wheezing  Dispense: 18 g; Refill: 0  - montelukast (SINGULAIR) 10 MG tablet; Take 1 tablet (10 mg) by mouth at bedtime  Dispense:  90 tablet; Refill: 0    7. Gastroesophageal reflux disease with esophagitis, unspecified whether hemorrhage  - Comprehensive metabolic panel (BMP + Alb, Alk Phos, ALT, AST, Total. Bili, TP); Future  - CBC with platelets; Future  - omeprazole (PRILOSEC) 20 MG DR capsule; Take 1 capsule (20 mg) by mouth daily  Dispense: 90 capsule; Refill: 2    8. Dyslipidemia  - Lipid panel reflex to direct LDL Non-fasting; Future  - simvastatin (ZOCOR) 10 MG tablet; Take 1 tablet (10 mg) by mouth at bedtime  Dispense: 90 tablet; Refill: 1    9. Essential hypertension  - bisoprolol (ZEBETA) 10 MG tablet; Take 1 tablet (10 mg) by mouth daily  Dispense: 90 tablet; Refill: 0  - Comprehensive metabolic panel (BMP + Alb, Alk Phos, ALT, AST, Total. Bili, TP); Future  - Comprehensive metabolic panel (BMP + Alb, Alk Phos, ALT, AST, Total. Bili, TP)    10. Asymptomatic menopause  - DEXA HIP/PELVIS/SPINE - Future; Future    11. Age-related bone loss  - Vitamin D Deficiency; Future  - Vitamin D Deficiency    12. Screen for colon cancer  - Fecal colorectal cancer screen FIT - Future (S+30); Future  - Fecal colorectal cancer screen FIT - Future (S+30)    13. Visit for screening mammogram  - MA Screening Bilateral w/ Darek; Future     [place]  [facility] Edwin # MFV    Please note that this note consists of symbols derived from keyboarding, dictation and/or voice recognition software. As a result, there may be errors in the script that have gone undetected. Please consider this when interpreting information found in this chart.    Patient Instructions   As discussed , please do fasting labs labs.   Refilled all the medications.   Mammogram and Dexa scan ordered  COVID vaccine given.   ==================================  Preventive Care Advice   This is general advice we often give to help people stay healthy. Your care team may have specific advice just for you. Please talk to your care team about your own preventive care  "needs.  Lifestyle  Exercise at least 150 minutes each week (30 minutes a day, 5 days a week).  Do muscle strengthening activities 2 days a week. These help control your weight and prevent disease.  No smoking.  Wear sunscreen to prevent skin cancer.  Have your home tested for radon every 2 to 5 years. Radon is a colorless, odorless gas that can harm your lungs. To learn more, go to www.health.[facility].mn. and search for \"Radon in Homes.\"  Keep guns unloaded and locked up in a safe place like a safe or gun vault, or, use a gun lock and hide the keys. Always lock away bullets separately. To learn more, visit [employer].[place].gov and search for \"safe gun storage.\"  Nutrition  Eat 5 or more servings of fruits and vegetables each day.  Try wheat bread, brown rice and whole grain pasta (instead of white bread, rice, and pasta).  Get enough calcium and vitamin D. Check the label on foods and aim for 100% of the RDA (recommended daily allowance).  Regular exams  Have a dental exam and cleaning every 6 months.  See your health care team every year to talk about:  Any changes in your health.  Any medicines your care team has prescribed.  Preventive care, family planning, and ways to prevent chronic diseases.  Shots (vaccines)   HPV shots (up to age 26), if you've never had them before.  Hepatitis B shots (up to age 59), if you've never had them before.  COVID-19 shot: Get this shot when it's due.  Flu shot: Get a flu shot every year.  Tetanus shot: Get a tetanus shot every 10 years.  Pneumococcal, hepatitis A, and RSV shots: Ask your care team if you need these based on your risk.  Shingles shot (for age 50 and up).  General health tests  Diabetes screening:  Starting at age 35, Get screened for diabetes at least every 3 years.  If you are younger than age 35, ask your care team if you should be screened for diabetes.  Cholesterol test: At age 39, start having a cholesterol test every 5 years, or more often if advised.  Bone density " scan (DEXA): At age 50, ask your care team if you should have this scan for osteoporosis (brittle bones).  Hepatitis C: Get tested at least once in your life.  Abdominal aortic aneurysm screening: Talk to your doctor about having this screening if you:  Have ever smoked; and  Are biologically male; and  Are between the ages of 65 and 75.  STIs (sexually transmitted infections)  Before age 24: Ask your care team if you should be screened for STIs.  After age 24: Get screened for STIs if you're at risk. You are at risk for STIs (including HIV) if:  You are sexually active with more than one person.  You don't use condoms every time.  You or a partner was diagnosed with a sexually transmitted infection.  If you are at risk for HIV, ask about PrEP medicine to prevent HIV.  Get tested for HIV at least once in your life, whether you are at risk for HIV or not.  Cancer screening tests  Cervical cancer screening: If you have a cervix, begin getting regular cervical cancer screening tests at age 21. Most people who have regular screenings with normal results can stop after age 65. Talk about this with your provider.  Breast cancer scan (mammogram): If you've ever had breasts, begin having regular mammograms starting at age 40. This is a scan to check for breast cancer.  Colon cancer screening: It is important to start screening for colon cancer at age 45.  Have a colonoscopy test every 10 years (or more often if you're at risk) Or, ask your provider about stool tests like a FIT test every year or Cologuard test every 3 years.  To learn more about your testing options, visit: www.[facility]/186711.pdf.  For help making a decision, visit: marie/hb97710.  Prostate cancer screening test: If you have a prostate and are age 55 to 69, ask your provider if you would benefit from a yearly prostate cancer screening test.  Lung cancer screening: If you are a current or former smoker age 50 to 80, ask your care team if ongoing lung  cancer screenings are right for you.  For informational purposes only. Not to replace the advice of your health care provider. Copyright   2023 [facility]. All rights reserved. Clinically reviewed by the [facility] Transitions Program. [employer] [id number] - REV 04/24.    Learning About Activities of Daily Living  What are activities of daily living?     Activities of daily living (ADLs) are the basic self-care tasks you do every day. These include eating, bathing, dressing, and moving around.  As you age, and if you have health problems, you may find that it's harder to do some of these tasks. If so, your doctor can suggest ideas that may help.  To measure what kind of help you may need, your doctor will ask how well you are able to do ADLs. Let your doctor know if there are any tasks that you are having trouble doing. This is an important first step to getting help. And when you have the help you need, you can stay as independent as possible.  How will a doctor assess your ADLs?  Asking about ADLs is part of a routine health checkup your doctor will likely do as you age. Your health check might be done in a doctor's office, in your home, or at a hospital. The goal is to find out if you are having any problems that could make it hard to care for yourself or that make it unsafe for you to be on your own.  To measure your ADLs, your doctor will ask how hard it is for you to do routine tasks. Your doctor may also want to know if you have changed the way you do a task because of a health problem. Your doctor may watch how you:  Walk back and forth.  Keep your balance while you stand or walk.  Move from sitting to standing or from a bed to a chair.  Button or unbutton a shirt or sweater.  Remove and put on your shoes.  It's common to feel a little worried or anxious if you find you can't do all the things you used to be able to do. Talking with your doctor about ADLs is a way to make sure you're as  safe as possible and able to care for yourself as well as you can. You may want to bring a caregiver, friend, or family member to your checkup. They can help you talk to your doctor.  Follow-up care is a key part of your treatment and safety. Be sure to make and go to all appointments, and call your doctor if you are having problems. It's also a good idea to know your test results and keep a list of the medicines you take.  Current as of: October 24, 2023               Content Version: 14.0    5185-5420 [employer].   Care instructions adapted under license by your healthcare professional. If you have questions about a medical condition or this instruction, always ask your healthcare professional. [employer] disclaims any warranty or liability for your use of this information.      Hearing Loss: Care Instructions  Overview     Hearing loss is a sudden or slow decrease in how well you hear. It can range from slight to profound. Permanent hearing loss can occur with aging. It also can happen when you are exposed long-term to loud noise. Examples include listening to loud music, riding motorcycles, or being around other loud machines.  Hearing loss can affect your work and home life. It can make you feel lonely or depressed. You may feel that you have lost your independence. But hearing aids and other devices can help you hear better and feel connected to others.  Follow-up care is a key part of your treatment and safety. Be sure to make and go to all appointments, and call your doctor if you are having problems. It's also a good idea to know your test results and keep a list of the medicines you take.  How can you care for yourself at home?  Avoid loud noises whenever possible. This helps keep your hearing from getting worse.  Always wear hearing protection around loud noises.  Wear a hearing aid as directed.  A professional can help you pick a hearing aid that will work best for you.  You  "can also get hearing aids over the counter for mild to moderate hearing loss.  Have hearing tests as your doctor suggests. They can show whether your hearing has changed. Your hearing aid may need to be adjusted.  Use other devices as needed. These may include:  Telephone amplifiers and hearing aids that can connect to a television, stereo, radio, or microphone.  Devices that use lights or vibrations. These alert you to the doorbell, a ringing telephone, or a baby monitor.  Television closed-captioning. This shows the words at the bottom of the screen. Most new TVs can do this.  TTY (text telephone). This lets you type messages back and forth on the telephone instead of talking or listening. These devices are also called TDD. When messages are typed on the keyboard, they are sent over the phone line to a receiving TTY. The message is shown on a monitor.  Use text messaging, social media, and email if it is hard for you to communicate by telephone.  Try to learn a listening technique called speechreading. It is not lipreading. You pay attention to people's gestures, expressions, posture, and tone of voice. These clues can help you understand what a person is saying. Face the person you are talking to, and have them face you. Make sure the lighting is good. You need to see the other person's face clearly.  Think about counseling if you need help to adjust to your hearing loss.  When should you call for help?  Watch closely for changes in your health, and be sure to contact your doctor if:    You think your hearing is getting worse.     You have new symptoms, such as dizziness or nausea.   Where can you learn more?  Go to https://www.[employer].net/patiented  Enter R798 in the search box to learn more about \"Hearing Loss: Care Instructions.\"  Current as of: September 27, 2023               Content Version: 14.0    0930-3219 [employer].   Care instructions adapted under license by your healthcare " professional. If you have questions about a medical condition or this instruction, always ask your healthcare professional. [employer] disclaims any warranty or liability for your use of this information.      Return in about 6 months (around 12/6/2024), or if symptoms worsen or fail to improve, for Follow up of last visit, If symptoms persist, video visit.    Amber Raya MD  [facility] ANAHY Nunn is a 74 year old, presenting for the following:  Wellness Visit        6/6/2024     9:26 AM   Additional Questions   Roomed by Susan FREEDMAN        Health Care Directive  Patient does not have a Health Care Directive or Living Will:     HPI        6/5/2024   General Health   How would you rate your overall physical health? (!) POOR   Feel stress (tense, anxious, or unable to sleep) Only a little   (!) STRESS CONCERN      6/5/2024   Nutrition   Diet: Regular (no restrictions)         6/5/2024   Exercise   Days per week of moderate/strenous exercise 0 days   Average minutes spent exercising at this level 0 min   (!) EXERCISE CONCERN      6/5/2024   Social Factors   Frequency of gathering with friends or relatives Three times a week   Worry food won't last until get money to buy more No   Food not last or not have enough money for food? No   Do you have housing?  Yes   Are you worried about losing your housing? No   Lack of transportation? No   Unable to get utilities (heat,electricity)? No         6/5/2024   Fall Risk   Fallen 2 or more times in the past year? No    No   Trouble with walking or balance? No    No          6/5/2024   Activities of Daily Living- Home Safety   Needs help with the following daily activites Transportation   Safety concerns in the home No grab bars in the bathroom         6/5/2024   Dental   Dentist two times every year? (!) NO         6/5/2024   Hearing Screening   Hearing concerns? (!) I FEEL THAT PEOPLE ARE MUMBLING OR NOT SPEAKING CLEARLY.     (!) I NEED TO ASK PEOPLE TO SPEAK UP OR REPEAT THEMSELVES.    (!) IT'S HARD TO FOLLOW A CONVERSATION IN A NOISY RESTAURANT OR CROWDED ROOM.    (!) TROUBLE UNDERSTANDING SOFT OR WHISPERED SPEECH.    (!) TROUBLE UNDERSTANDING SPEECH ON THE TELEPHONE         2024   Driving Risk Screening   Patient/family members have concerns about driving No         2024   General Alertness/Fatigue Screening   Have you been more tired than usual lately? No         2024   Urinary Incontinence Screening   Bothered by leaking urine in past 6 months No         2024   TB Screening   Were you born outside of the [place]? Yes         Today's PHQ-2 Score:       2024     7:41 PM   PHQ-2 (  [employer])   Q1: Little interest or pleasure in doing things 0   Q2: Feeling down, depressed or hopeless 0   PHQ-2 Score 0   Q1: Little interest or pleasure in doing things Not at all   Q2: Feeling down, depressed or hopeless Not at all   PHQ-2 Score 0           2024   Substance Use   Alcohol more than 3/day or more than 7/wk No   Do you have a current opioid prescription? No   How severe/bad is pain from 1 to 10? 5/10   Do you use any other substances recreationally? No     Social History     Tobacco Use    Smoking status: Former     Current packs/day: 0.00     Types: Cigarettes     Quit date: 2018     Years since quittin.2    Smokeless tobacco: Never    Tobacco comments:     Smoked a few cigarettes every day for 40 years.   Substance Use Topics    Alcohol use: No    Drug use: No           2022   LAST FHS-7 RESULTS   1st degree relative breast or ovarian cancer No    Unknown   Any relative bilateral breast cancer No    Unknown   Any male have breast cancer No    Unknown   Any ONE woman have BOTH breast AND ovarian cancer No    Unknown   Any woman with breast cancer before 50yrs No    Unknown   2 or more relatives with breast AND/OR ovarian cancer No    Unknown   2 or more relatives with breast AND/OR bowel cancer No     Unknown        Mammogram Screening - Annual screen due to greater than 20% lifetime risk as estimated by Breast Cancer Risk Calculator    ASCVD Risk   The 10-year ASCVD risk score (Phuong HARLEY, et al., 2019) is: 17.6%    Values used to calculate the score:      Age: 74 years      Sex: Female      Is Non- : No      Diabetic: No      Tobacco smoker: No      Systolic Blood Pressure: 130 mmHg      Is BP treated: Yes      HDL Cholesterol: 64 mg/dL      Total Cholesterol: 203 mg/dL    Reviewed and updated as needed this visit by Provider   Tobacco  Allergies  Meds  Problems  Med Hx  Surg Hx  Fam Hx            Past Medical History:   Diagnosis Date    Essential hypertension 2019    Obesity (BMI 35.0-39.9) with comorbidity (H) 2019     Past Surgical History:   Procedure Laterality Date    CHOLECYSTECTOMY  2018     OB History   No obstetric history on file.     Lab work is in process  Labs reviewed in EPIC  BP Readings from Last 3 Encounters:   24 130/82   23 138/88   23 (!) 160/104    Wt Readings from Last 3 Encounters:   24 106.1 kg (234 lb)   23 107 kg (236 lb)   23 107 kg (236 lb)                  Patient Active Problem List   Diagnosis    Essential hypertension    Obesity (BMI 35.0-39.9) with comorbidity (H)    Aortic root dilatation (H24)    External hemorrhoids    Chronic kidney disease, stage 3 (H)    Allergic bronchitis without complication    Moderate persistent asthma without complication    Posttraumatic stress disorder    Dyslipidemia    Cerebrovascular disease     Past Surgical History:   Procedure Laterality Date    CHOLECYSTECTOMY  2018       Social History     Tobacco Use    Smoking status: Former     Current packs/day: 0.00     Types: Cigarettes     Quit date: 2018     Years since quittin.2    Smokeless tobacco: Never    Tobacco comments:     Smoked a few cigarettes every day for 40 years.   Substance Use Topics     Alcohol use: No     Family History   Problem Relation Age of Onset    Cerebrovascular Disease Mother     Hypertension Mother     Hyperlipidemia Mother     Obesity Mother     Cerebrovascular Disease Sister 40    Unknown/Adopted Father          Current Outpatient Medications   Medication Sig Dispense Refill    albuterol (PROAIR HFA/PROVENTIL HFA/VENTOLIN HFA) 108 (90 Base) MCG/ACT inhaler Inhale 2 puffs into the lungs every 6 hours as needed for shortness of breath or wheezing 18 g 0    albuterol (PROAIR HFA/PROVENTIL HFA/VENTOLIN HFA) 108 (90 Base) MCG/ACT inhaler Inhale 2 puffs into the lungs every 6 hours as needed for shortness of breath, wheezing or cough 18 g 1    amLODIPine (NORVASC) 5 MG tablet TAKE 1 TABLET(5 MG) BY MOUTH DAILY 90 tablet 0    benzonatate (TESSALON) 100 MG capsule Take 1-2 capsules by mouth up to 3 x a day as needed with food 45 capsule 0    bisoprolol (ZEBETA) 10 MG tablet Take 1 tablet (10 mg) by mouth daily 90 tablet 0    fluticasone-salmeterol (ADVAIR) 500-50 MCG/ACT inhaler Inhale 1 puff into the lungs every 12 hours 60 each 1    guaiFENesin (ROBITUSSIN) 20 mg/mL liquid Take 10 mLs (200 mg) by mouth every 4 hours as needed for cough 236 mL 1    ipratropium - albuterol 0.5 mg/2.5 mg/3 mL (DUONEB) 0.5-2.5 (3) MG/3ML neb solution Take 1 vial (3 mLs) by nebulization every 6 hours as needed for shortness of breath, wheezing or cough 90 mL 1    losartan (COZAAR) 100 MG tablet TAKE 1 TABLET(100 MG) BY MOUTH DAILY 90 tablet 2    montelukast (SINGULAIR) 10 MG tablet Take 1 tablet (10 mg) by mouth at bedtime 90 tablet 0    omeprazole (PRILOSEC) 20 MG DR capsule Take 1 capsule (20 mg) by mouth daily 90 capsule 2    simvastatin (ZOCOR) 10 MG tablet Take 1 tablet (10 mg) by mouth at bedtime 90 tablet 1     No Known Allergies  Recent Labs   Lab Test 11/07/23  1557 05/31/23  0935 02/03/22  1154 02/03/22  1154 06/11/21  1442 01/05/21  0846 09/15/20  1104 02/27/19  0914   A1C  --   --   --   --   --  5.7*  5.4  --    LDL  --  129*  --  139* 100*  --   --  90   HDL  --  64  --  56 55  --   --  55   TRIG  --  52  --  106 150*  --   --  54   ALT 40 22  --  27  --   --  29 24   CR 0.86 0.90   < > 1.01 0.98  --  1.01 0.90   GFRESTIMATED 71 68   < > 60* 59*  --  57* 65   GFRESTBLACK  --   --   --   --  68  --  66 76   POTASSIUM 4.0 3.7  --  3.8 4.0  --  4.6 3.9   TSH  --  2.59  --   --   --   --   --  2.61    < > = values in this interval not displayed.      Current providers sharing in care for this patient include:  Patient Care Team:  [facility] [place] Elise Labette as PCP - General  Amber Raya MD as Assigned PCP    The following health maintenance items are reviewed in Epic and correct as of today:  Health Maintenance   Topic Date Due    DEXA  Never done    ASTHMA ACTION PLAN  Never done    Pneumococcal Vaccine: 65+ Years (1 of 2 - PCV) Never done    ZOSTER IMMUNIZATION (1 of 2) Never done    LUNG CANCER SCREENING  Never done    RSV VACCINE (Pregnancy & 60+) (1 - 1-dose 60+ series) Never done    DTAP/TDAP/TD IMMUNIZATION (1 - Tdap) 06/01/2023    MAMMO SCREENING  02/16/2024    BMP  05/07/2024    LIPID  05/31/2024    MICROALBUMIN  05/31/2024    COLORECTAL CANCER SCREENING  05/31/2024    INFLUENZA VACCINE (Season Ended) 09/01/2024    COVID-19 Vaccine (5 - 2023-24 season) 10/06/2024    ASTHMA CONTROL TEST  12/06/2024    MEDICARE ANNUAL WELLNESS VISIT  06/06/2025    ANNUAL REVIEW OF HM ORDERS  06/06/2025    FALL RISK ASSESSMENT  06/06/2025    HEMOGLOBIN  06/06/2025    GLUCOSE  11/07/2026    ADVANCE CARE PLANNING  06/01/2028    HEPATITIS C SCREENING  Completed    PHQ-2 (once per calendar year)  Completed    URINALYSIS  Completed    IPV IMMUNIZATION  Aged Out    HPV IMMUNIZATION  Aged Out    MENINGITIS IMMUNIZATION  Aged Out    RSV MONOCLONAL ANTIBODY  Aged Out         Review of Systems  Constitutional, HEENT, cardiovascular, pulmonary, GI, , musculoskeletal, neuro, skin, endocrine and psych systems are negative,  "except as otherwise noted.     Objective    Exam  /82 (BP Location: Right arm, Patient Position: Sitting, Cuff Size: Adult Large)   Pulse 58   Temp 97.2  F (36.2  C) (Tympanic)   Resp 22   Ht 1.66 m (5' 5.35\")   Wt 106.1 kg (234 lb)   SpO2 97%   BMI 38.52 kg/m     Estimated body mass index is 38.52 kg/m  as calculated from the following:    Height as of this encounter: 1.66 m (5' 5.35\").    Weight as of this encounter: 106.1 kg (234 lb).    Physical Exam  GENERAL: alert and no distress  EYES: Eyes grossly normal to inspection, PERRL and conjunctivae and sclerae normal  HENT: ear canals and TM's normal, nose and mouth without ulcers or lesions  NECK: no adenopathy, no asymmetry, masses, or scars  RESP: lungs clear to auscultation - no rales, rhonchi or wheezes  BREAST: Deferred Mammogram  CV: regular rate and rhythm, normal S1 S2, no S3 or S4, no murmur, click or rub, no peripheral edema  ABDOMEN: soft, nontender, no hepatosplenomegaly, no masses and bowel sounds normal  MS: no gross musculoskeletal defects noted, no edema  SKIN: no suspicious lesions or rashes  NEURO: Normal strength and tone, mentation intact and speech normal  PSYCH: mentation appears normal, affect normal/bright         6/6/2024   Mini Cog   Mini-Cog Not Completed (choose reason) Language barrier and no  present   Clock Draw Score 0 Abnormal   3 Item Recall 3 objects recalled   Mini Cog Total Score 3             Signed Electronically by: Amber Raya MD    "

## 2024-06-06 NOTE — PATIENT INSTRUCTIONS
"As discussed , please do fasting labs labs.   Refilled all the medications.   Mammogram and Dexa scan ordered  COVID vaccine given.   ==================================  Preventive Care Advice   This is general advice we often give to help people stay healthy. Your care team may have specific advice just for you. Please talk to your care team about your own preventive care needs.  Lifestyle  Exercise at least 150 minutes each week (30 minutes a day, 5 days a week).  Do muscle strengthening activities 2 days a week. These help control your weight and prevent disease.  No smoking.  Wear sunscreen to prevent skin cancer.  Have your home tested for radon every 2 to 5 years. Radon is a colorless, odorless gas that can harm your lungs. To learn more, go to www.health.[facility].mn.us and search for \"Radon in Homes.\"  Keep guns unloaded and locked up in a safe place like a safe or gun vault, or, use a gun lock and hide the keys. Always lock away bullets separately. To learn more, visit [employer].gov and search for \"safe gun storage.\"  Nutrition  Eat 5 or more servings of fruits and vegetables each day.  Try wheat bread, brown rice and whole grain pasta (instead of white bread, rice, and pasta).  Get enough calcium and vitamin D. Check the label on foods and aim for 100% of the RDA (recommended daily allowance).  Regular exams  Have a dental exam and cleaning every 6 months.  See your health care team every year to talk about:  Any changes in your health.  Any medicines your care team has prescribed.  Preventive care, family planning, and ways to prevent chronic diseases.  Shots (vaccines)   HPV shots (up to age 26), if you've never had them before.  Hepatitis B shots (up to age 59), if you've never had them before.  COVID-19 shot: Get this shot when it's due.  Flu shot: Get a flu shot every year.  Tetanus shot: Get a tetanus shot every 10 years.  Pneumococcal, hepatitis A, and RSV shots: Ask your care team if you need these based " on your risk.  Shingles shot (for age 50 and up).  General health tests  Diabetes screening:  Starting at age 35, Get screened for diabetes at least every 3 years.  If you are younger than age 35, ask your care team if you should be screened for diabetes.  Cholesterol test: At age 39, start having a cholesterol test every 5 years, or more often if advised.  Bone density scan (DEXA): At age 50, ask your care team if you should have this scan for osteoporosis (brittle bones).  Hepatitis C: Get tested at least once in your life.  Abdominal aortic aneurysm screening: Talk to your doctor about having this screening if you:  Have ever smoked; and  Are biologically male; and  Are between the ages of 65 and 75.  STIs (sexually transmitted infections)  Before age 24: Ask your care team if you should be screened for STIs.  After age 24: Get screened for STIs if you're at risk. You are at risk for STIs (including HIV) if:  You are sexually active with more than one person.  You don't use condoms every time.  You or a partner was diagnosed with a sexually transmitted infection.  If you are at risk for HIV, ask about PrEP medicine to prevent HIV.  Get tested for HIV at least once in your life, whether you are at risk for HIV or not.  Cancer screening tests  Cervical cancer screening: If you have a cervix, begin getting regular cervical cancer screening tests at age 21. Most people who have regular screenings with normal results can stop after age 65. Talk about this with your provider.  Breast cancer scan (mammogram): If you've ever had breasts, begin having regular mammograms starting at age 40. This is a scan to check for breast cancer.  Colon cancer screening: It is important to start screening for colon cancer at age 45.  Have a colonoscopy test every 10 years (or more often if you're at risk) Or, ask your provider about stool tests like a FIT test every year or Cologuard test every 3 years.  To learn more about your testing  options, visit: www.fvfiles.com/534641.pdf.  For help making a decision, visit: nida.arline/lw30471.  Prostate cancer screening test: If you have a prostate and are age 55 to 69, ask your provider if you would benefit from a yearly prostate cancer screening test.  Lung cancer screening: If you are a current or former smoker age 50 to 80, ask your care team if ongoing lung cancer screenings are right for you.  For informational purposes only. Not to replace the advice of your health care provider. Copyright   2023 [employer]. All rights reserved. Clinically reviewed by the [facility] Transitions Program. [place] 275717 - REV 04/24.    Learning About Activities of Daily Living  What are activities of daily living?     Activities of daily living (ADLs) are the basic self-care tasks you do every day. These include eating, bathing, dressing, and moving around.  As you age, and if you have health problems, you may find that it's harder to do some of these tasks. If so, your doctor can suggest ideas that may help.  To measure what kind of help you may need, your doctor will ask how well you are able to do ADLs. Let your doctor know if there are any tasks that you are having trouble doing. This is an important first step to getting help. And when you have the help you need, you can stay as independent as possible.  How will a doctor assess your ADLs?  Asking about ADLs is part of a routine health checkup your doctor will likely do as you age. Your health check might be done in a doctor's office, in your home, or at a hospital. The goal is to find out if you are having any problems that could make it hard to care for yourself or that make it unsafe for you to be on your own.  To measure your ADLs, your doctor will ask how hard it is for you to do routine tasks. Your doctor may also want to know if you have changed the way you do a task because of a health problem. Your doctor may watch how you:  Walk back  and forth.  Keep your balance while you stand or walk.  Move from sitting to standing or from a bed to a chair.  Button or unbutton a shirt or sweater.  Remove and put on your shoes.  It's common to feel a little worried or anxious if you find you can't do all the things you used to be able to do. Talking with your doctor about ADLs is a way to make sure you're as safe as possible and able to care for yourself as well as you can. You may want to bring a caregiver, friend, or family member to your checkup. They can help you talk to your doctor.  Follow-up care is a key part of your treatment and safety. Be sure to make and go to all appointments, and call your doctor if you are having problems. It's also a good idea to know your test results and keep a list of the medicines you take.  Current as of: October 24, 2023               Content Version: 14.0    7771-4344 [employer].   Care instructions adapted under license by your healthcare professional. If you have questions about a medical condition or this instruction, always ask your healthcare professional. [employer] disclaims any warranty or liability for your use of this information.      Hearing Loss: Care Instructions  Overview     Hearing loss is a sudden or slow decrease in how well you hear. It can range from slight to profound. Permanent hearing loss can occur with aging. It also can happen when you are exposed long-term to loud noise. Examples include listening to loud music, riding motorcycles, or being around other loud machines.  Hearing loss can affect your work and home life. It can make you feel lonely or depressed. You may feel that you have lost your independence. But hearing aids and other devices can help you hear better and feel connected to others.  Follow-up care is a key part of your treatment and safety. Be sure to make and go to all appointments, and call your doctor if you are having problems. It's also a good  idea to know your test results and keep a list of the medicines you take.  How can you care for yourself at home?  Avoid loud noises whenever possible. This helps keep your hearing from getting worse.  Always wear hearing protection around loud noises.  Wear a hearing aid as directed.  A professional can help you pick a hearing aid that will work best for you.  You can also get hearing aids over the counter for mild to moderate hearing loss.  Have hearing tests as your doctor suggests. They can show whether your hearing has changed. Your hearing aid may need to be adjusted.  Use other devices as needed. These may include:  Telephone amplifiers and hearing aids that can connect to a television, stereo, radio, or microphone.  Devices that use lights or vibrations. These alert you to the doorbell, a ringing telephone, or a baby monitor.  Television closed-captioning. This shows the words at the bottom of the screen. Most new TVs can do this.  TTY (text telephone). This lets you type messages back and forth on the telephone instead of talking or listening. These devices are also called TDD. When messages are typed on the keyboard, they are sent over the phone line to a receiving TTY. The message is shown on a monitor.  Use text messaging, social media, and email if it is hard for you to communicate by telephone.  Try to learn a listening technique called speechreading. It is not lipreading. You pay attention to people's gestures, expressions, posture, and tone of voice. These clues can help you understand what a person is saying. Face the person you are talking to, and have them face you. Make sure the lighting is good. You need to see the other person's face clearly.  Think about counseling if you need help to adjust to your hearing loss.  When should you call for help?  Watch closely for changes in your health, and be sure to contact your doctor if:    You think your hearing is getting worse.     You have new  "symptoms, such as dizziness or nausea.   Where can you learn more?  Go to https://www.[employer].net/patiented  Enter R798 in the search box to learn more about \"Hearing Loss: Care Instructions.\"  Current as of: September 27, 2023               Content Version: 14.0    1431-2591 [employer].   Care instructions adapted under license by your healthcare professional. If you have questions about a medical condition or this instruction, always ask your healthcare professional. [employer], [employer] disclaims any warranty or liability for your use of this information.      "

## 2024-06-06 NOTE — Clinical Note
Cruz Logan,   Is this Medicare annual wellness or regular preventative visit?  Thanks & Regards, Amber Raya MD on 6/6/2024

## 2024-06-07 LAB
ALBUMIN SERPL BCG-MCNC: 4.5 G/DL (ref 3.5–5.2)
ALP SERPL-CCNC: 78 U/L (ref 40–150)
ALT SERPL W P-5'-P-CCNC: 21 U/L (ref 0–50)
ANION GAP SERPL CALCULATED.3IONS-SCNC: 13 MMOL/L (ref 7–15)
AST SERPL W P-5'-P-CCNC: 25 U/L (ref 0–45)
BILIRUB SERPL-MCNC: 1.1 MG/DL
BUN SERPL-MCNC: 21.9 MG/DL (ref 8–23)
CALCIUM SERPL-MCNC: 9.3 MG/DL (ref 8.8–10.2)
CHLORIDE SERPL-SCNC: 110 MMOL/L (ref 98–107)
CHOLEST SERPL-MCNC: 198 MG/DL
CREAT SERPL-MCNC: 0.92 MG/DL (ref 0.51–0.95)
CREAT UR-MCNC: 157 MG/DL
DEPRECATED HCO3 PLAS-SCNC: 21 MMOL/L (ref 22–29)
EGFRCR SERPLBLD CKD-EPI 2021: 65 ML/MIN/1.73M2
FASTING STATUS PATIENT QL REPORTED: YES
FASTING STATUS PATIENT QL REPORTED: YES
GLUCOSE SERPL-MCNC: 97 MG/DL (ref 70–99)
HDLC SERPL-MCNC: 67 MG/DL
LDLC SERPL CALC-MCNC: 113 MG/DL
MICROALBUMIN UR-MCNC: 58.8 MG/L
MICROALBUMIN/CREAT UR: 37.45 MG/G CR (ref 0–25)
NONHDLC SERPL-MCNC: 131 MG/DL
POTASSIUM SERPL-SCNC: 3.9 MMOL/L (ref 3.4–5.3)
PROT SERPL-MCNC: 7.4 G/DL (ref 6.4–8.3)
SODIUM SERPL-SCNC: 144 MMOL/L (ref 135–145)
TRIGL SERPL-MCNC: 88 MG/DL
VIT D+METAB SERPL-MCNC: 34 NG/ML (ref 20–50)

## 2024-06-07 ASSESSMENT — ASTHMA QUESTIONNAIRES: ACT_TOTALSCORE: 25

## 2024-07-23 ENCOUNTER — TELEPHONE (OUTPATIENT)
Dept: FAMILY MEDICINE | Facility: CLINIC | Age: 74
End: 2024-07-23
Payer: COMMERCIAL

## 2024-08-22 DIAGNOSIS — Z00.00 ROUTINE GENERAL MEDICAL EXAMINATION AT A HEALTH CARE FACILITY: ICD-10-CM

## 2024-08-22 DIAGNOSIS — I10 ESSENTIAL HYPERTENSION: ICD-10-CM

## 2024-08-22 DIAGNOSIS — I77.810 AORTIC ROOT DILATATION (H): ICD-10-CM

## 2024-08-23 RX ORDER — BISOPROLOL FUMARATE 10 MG/1
10 TABLET, FILM COATED ORAL DAILY
Qty: 90 TABLET | Refills: 1 | Status: SHIPPED | OUTPATIENT
Start: 2024-08-23

## 2024-08-23 RX ORDER — LOSARTAN POTASSIUM 100 MG/1
TABLET ORAL
Qty: 90 TABLET | Refills: 1 | Status: SHIPPED | OUTPATIENT
Start: 2024-08-23

## 2024-08-24 RX ORDER — AMLODIPINE BESYLATE 5 MG/1
5 TABLET ORAL DAILY
Qty: 90 TABLET | Refills: 0 | Status: SHIPPED | OUTPATIENT
Start: 2024-08-24

## 2024-10-22 ENCOUNTER — OFFICE VISIT (OUTPATIENT)
Dept: FAMILY MEDICINE | Facility: CLINIC | Age: 74
End: 2024-10-22
Payer: COMMERCIAL

## 2024-10-22 VITALS
HEART RATE: 63 BPM | WEIGHT: 246.7 LBS | BODY MASS INDEX: 40.61 KG/M2 | RESPIRATION RATE: 20 BRPM | OXYGEN SATURATION: 96 % | SYSTOLIC BLOOD PRESSURE: 136 MMHG | TEMPERATURE: 98 F | DIASTOLIC BLOOD PRESSURE: 84 MMHG

## 2024-10-22 DIAGNOSIS — H90.6 MIXED HEARING LOSS, BILATERAL: ICD-10-CM

## 2024-10-22 DIAGNOSIS — H66.003 ACUTE SUPPURATIVE OTITIS MEDIA OF BOTH EARS WITHOUT SPONTANEOUS RUPTURE OF TYMPANIC MEMBRANES, RECURRENCE NOT SPECIFIED: ICD-10-CM

## 2024-10-22 DIAGNOSIS — H60.393 INFECTIVE OTITIS EXTERNA, BILATERAL: Primary | ICD-10-CM

## 2024-10-22 PROCEDURE — 99213 OFFICE O/P EST LOW 20 MIN: CPT | Performed by: PHYSICIAN ASSISTANT

## 2024-10-22 RX ORDER — CIPROFLOXACIN AND DEXAMETHASONE 3; 1 MG/ML; MG/ML
4 SUSPENSION/ DROPS AURICULAR (OTIC) 2 TIMES DAILY
Qty: 7.5 ML | Refills: 0 | Status: SHIPPED | OUTPATIENT
Start: 2024-10-22 | End: 2024-11-01

## 2024-10-22 ASSESSMENT — PAIN SCALES - GENERAL: PAINLEVEL: NO PAIN (0)

## 2024-10-22 NOTE — PROGRESS NOTES
Assessment and Plan:     (H60.393) Infective otitis externa, bilateral  (primary encounter diagnosis)  Comment: right external canal with purulent drainage, also has fluid behind the right TM, cannot visualize TM fully and unsure if defect/perf, had frequent otitis as a child, will use ciprodex (not ototoxic) in case perfed TM  Plan: amoxicillin-clavulanate (AUGMENTIN) 875-125 MG         tablet, ciprofloxacin-dexAMETHasone (CIPRODEX)         0.3-0.1 % otic suspension, Adult ENT          Referral    (H66.003) Acute suppurative otitis media of both ears without spontaneous rupture of tympanic membranes, recurrence not specified  Comment: left TM with fluid and bulging, ext canal with small amount of purulent debris and swelling, not a swimmer  Plan: amoxicillin-clavulanate (AUGMENTIN) 875-125 MG         tablet, ciprofloxacin-dexAMETHasone (CIPRODEX)         0.3-0.1 % otic suspension, Adult ENT          Referral        See above    (H90.6) Mixed hearing loss, bilateral  Comment: would like to see audiology for hearing loss over the last few years   Plan: Adult Audiology  Referral          [facility]     Subjective   Arline is a 74 year old, presenting for the following health issues:  Ear Problem (Patient is here with complaints of ears hurting and drainage.)      10/22/2024     2:36 PM   Additional Questions   Roomed by Liang DAO MA   Accompanied by Jessica daughter and able to interpret.     History of Present Illness       Reason for visit:  Ear infection  Symptom onset:  3-7 days ago She is missing 1 dose(s) of medications per week.       Arline is here for bilateral ear drainage  Onset 3-4 days ago  She denies ear pain, fever/chills  She does not swim regularly  She hasn't had recent URI symptoms   Had similar symptoms about 10 years ago    Had frequent ear infections as a child    Her daughter has noticed gradual hearing loss over the last several  years, would like to see audiology         Objective    /84 (BP Location: Right arm, Patient Position: Sitting, Cuff Size: Adult Large)   Pulse 63   Temp 98  F (36.7  C) (Temporal)   Resp 20   Wt 111.9 kg (246 lb 11.2 oz)   SpO2 96%   BMI 40.61 kg/m    Body mass index is 40.61 kg/m .    Physical Exam     EXAM:  GENERAL APPEARANCE: healthy, alert and no distress  EYES: Eyes grossly normal to inspection  HENT: ear canals with purulent fluid R>L, left TM with purulent fluid and bulging TM, no visible TM defect, mouth without ulcers or lesions, oropharynx is clear without exudate or erythema, no tonsillar swelling  NECK: supple no adenopathy, no asymmetry, masses  RESP: lungs clear to auscultation - no rales, rhonchi or wheezes  CV: regular rates and rhythm, normal S1 S2, no S3 or S4 and no murmur, click or rub            Signed Electronically by: Marley Del Rosario PA-C

## 2024-10-22 NOTE — PATIENT INSTRUCTIONS
Start antibiotics today    Follow-up with ENT    [facility] if you can't get in with [facility] in the next month for follow-up

## 2024-11-15 DIAGNOSIS — I77.810 AORTIC ROOT DILATATION (H): ICD-10-CM

## 2024-11-15 DIAGNOSIS — J45.40 MODERATE PERSISTENT ASTHMA WITHOUT COMPLICATION: ICD-10-CM

## 2024-11-15 DIAGNOSIS — Z00.00 ROUTINE GENERAL MEDICAL EXAMINATION AT A HEALTH CARE FACILITY: ICD-10-CM

## 2024-11-15 DIAGNOSIS — I10 ESSENTIAL HYPERTENSION: ICD-10-CM

## 2024-11-15 DIAGNOSIS — E78.5 DYSLIPIDEMIA: ICD-10-CM

## 2024-11-15 RX ORDER — SIMVASTATIN 10 MG
10 TABLET ORAL AT BEDTIME
Qty: 90 TABLET | Refills: 1 | Status: SHIPPED | OUTPATIENT
Start: 2024-11-15

## 2024-11-15 RX ORDER — ALBUTEROL SULFATE 90 UG/1
2 AEROSOL, METERED RESPIRATORY (INHALATION) EVERY 6 HOURS PRN
Qty: 18 G | Refills: 1 | Status: SHIPPED | OUTPATIENT
Start: 2024-11-15

## 2024-11-15 RX ORDER — BISOPROLOL FUMARATE 10 MG/1
10 TABLET, FILM COATED ORAL DAILY
Qty: 90 TABLET | Refills: 1 | OUTPATIENT
Start: 2024-11-15

## 2024-11-20 DIAGNOSIS — I10 ESSENTIAL HYPERTENSION: ICD-10-CM

## 2024-11-21 DIAGNOSIS — J45.40 MODERATE PERSISTENT ASTHMA WITHOUT COMPLICATION: ICD-10-CM

## 2024-11-21 DIAGNOSIS — Z00.00 ROUTINE GENERAL MEDICAL EXAMINATION AT A HEALTH CARE FACILITY: ICD-10-CM

## 2024-11-21 RX ORDER — IPRATROPIUM BROMIDE AND ALBUTEROL SULFATE 2.5; .5 MG/3ML; MG/3ML
SOLUTION RESPIRATORY (INHALATION)
Qty: 90 ML | Refills: 0 | Status: SHIPPED | OUTPATIENT
Start: 2024-11-21

## 2024-11-21 RX ORDER — AMLODIPINE BESYLATE 5 MG/1
5 TABLET ORAL DAILY
Qty: 90 TABLET | Refills: 1 | Status: SHIPPED | OUTPATIENT
Start: 2024-11-21

## 2024-11-21 RX ORDER — FLUTICASONE PROPIONATE AND SALMETEROL 50; 500 UG/1; UG/1
1 POWDER RESPIRATORY (INHALATION) EVERY 12 HOURS
Qty: 180 EACH | Refills: 0 | Status: SHIPPED | OUTPATIENT
Start: 2024-11-21

## 2024-11-29 DIAGNOSIS — Z00.00 ROUTINE GENERAL MEDICAL EXAMINATION AT A HEALTH CARE FACILITY: ICD-10-CM

## 2024-11-29 DIAGNOSIS — J45.40 MODERATE PERSISTENT ASTHMA WITHOUT COMPLICATION: ICD-10-CM

## 2024-12-02 DIAGNOSIS — I10 ESSENTIAL HYPERTENSION: ICD-10-CM

## 2024-12-02 DIAGNOSIS — I77.810 AORTIC ROOT DILATATION (H): ICD-10-CM

## 2024-12-02 RX ORDER — MONTELUKAST SODIUM 10 MG/1
1 TABLET ORAL AT BEDTIME
Qty: 90 TABLET | Refills: 0 | Status: SHIPPED | OUTPATIENT
Start: 2024-12-02

## 2024-12-02 RX ORDER — LOSARTAN POTASSIUM 100 MG/1
TABLET ORAL
Qty: 90 TABLET | Refills: 1 | OUTPATIENT
Start: 2024-12-02

## 2024-12-02 NOTE — TELEPHONE ENCOUNTER
Prescription approved per [facility] Refill Protocol.  Ita Levine RN  [facility]

## 2024-12-03 DIAGNOSIS — J45.40 MODERATE PERSISTENT ASTHMA WITHOUT COMPLICATION: ICD-10-CM

## 2024-12-03 DIAGNOSIS — Z00.00 ROUTINE GENERAL MEDICAL EXAMINATION AT A HEALTH CARE FACILITY: ICD-10-CM

## 2024-12-03 RX ORDER — MONTELUKAST SODIUM 10 MG/1
1 TABLET ORAL AT BEDTIME
Qty: 90 TABLET | Refills: 0 | OUTPATIENT
Start: 2024-12-03

## 2024-12-11 ENCOUNTER — TELEPHONE (OUTPATIENT)
Dept: FAMILY MEDICINE | Facility: CLINIC | Age: 74
End: 2024-12-11
Payer: COMMERCIAL

## 2024-12-11 NOTE — TELEPHONE ENCOUNTER
Patient Quality Outreach    Patient is due for the following:   Breast Cancer Screening - Mammogram    Action(s) Taken:   Patient needs to schedule screening    Type of outreach:    Sent [employer] message.    Questions for provider review:    None           Erlinda Franz MA

## 2024-12-26 ENCOUNTER — PATIENT OUTREACH (OUTPATIENT)
Dept: CARE COORDINATION | Facility: CLINIC | Age: 74
End: 2024-12-26
Payer: COMMERCIAL

## 2025-01-22 ENCOUNTER — TELEPHONE (OUTPATIENT)
Dept: FAMILY MEDICINE | Facility: CLINIC | Age: 75
End: 2025-01-22
Payer: COMMERCIAL

## 2025-01-22 NOTE — TELEPHONE ENCOUNTER
Patient Returning Call    Reason for call:  Pt's daughter called in and would like to know why the pharmacy won't fill the prescriptions - was told to contact the clinic. Daughter mentioned that care team has called in the past to get this straightened out. Daughter wanted to know if an appt is needed.       Patient has additional questions:  No      Could we send this information to you in [facility] or would you prefer to receive a phone call?:   Patient would prefer a phone call   Okay to leave a detailed message?: Yes at Other phone number:  Jessica (daughter) [phone]

## 2025-01-23 NOTE — TELEPHONE ENCOUNTER
Team has sent [employer] [facility] messages with appt requests x2   Please let the pt know that she should de-activate her mychart if she doesn't check it >> so someone can call her.       Please call the patient and schedule VV of medication follow up with me, which patient is due at this time, to further take care of the medical conditions and medications. OK to use same day slot / 8 AM on Tuesdays/Fridays in 1-2 weeks.     ( FYI - Pt doesn't respond [facility] [facility] messages - please call instead of sending [employer] [facility] message for scheduling )     Thank you,  Amber Raya MD on 1/22/2025

## 2025-01-27 ENCOUNTER — OFFICE VISIT (OUTPATIENT)
Dept: URGENT CARE | Facility: URGENT CARE | Age: 75
End: 2025-01-27
Payer: COMMERCIAL

## 2025-01-27 ENCOUNTER — HOSPITAL ENCOUNTER (INPATIENT)
Facility: CLINIC | Age: 75
End: 2025-01-27
Attending: EMERGENCY MEDICINE | Admitting: INTERNAL MEDICINE
Payer: COMMERCIAL

## 2025-01-27 ENCOUNTER — ANCILLARY PROCEDURE (OUTPATIENT)
Dept: GENERAL RADIOLOGY | Facility: CLINIC | Age: 75
End: 2025-01-27
Attending: FAMILY MEDICINE
Payer: COMMERCIAL

## 2025-01-27 VITALS
WEIGHT: 238.4 LBS | TEMPERATURE: 99 F | SYSTOLIC BLOOD PRESSURE: 95 MMHG | HEART RATE: 125 BPM | RESPIRATION RATE: 26 BRPM | DIASTOLIC BLOOD PRESSURE: 57 MMHG | OXYGEN SATURATION: 95 % | BODY MASS INDEX: 39.24 KG/M2

## 2025-01-27 DIAGNOSIS — J10.1 INFLUENZA A: ICD-10-CM

## 2025-01-27 DIAGNOSIS — I48.91 ATRIAL FIBRILLATION WITH RVR (H): ICD-10-CM

## 2025-01-27 DIAGNOSIS — I48.91 ATRIAL FIBRILLATION WITH RAPID VENTRICULAR RESPONSE (H): Primary | ICD-10-CM

## 2025-01-27 DIAGNOSIS — R00.0 RACING HEART BEAT: ICD-10-CM

## 2025-01-27 DIAGNOSIS — J45.40 MODERATE PERSISTENT ASTHMA WITHOUT COMPLICATION: ICD-10-CM

## 2025-01-27 DIAGNOSIS — R79.89 ELEVATED BRAIN NATRIURETIC PEPTIDE (BNP) LEVEL: ICD-10-CM

## 2025-01-27 DIAGNOSIS — R05.1 ACUTE COUGH: ICD-10-CM

## 2025-01-27 DIAGNOSIS — I50.31 ACUTE DIASTOLIC CONGESTIVE HEART FAILURE (H): ICD-10-CM

## 2025-01-27 DIAGNOSIS — Z00.00 ROUTINE GENERAL MEDICAL EXAMINATION AT A HEALTH CARE FACILITY: ICD-10-CM

## 2025-01-27 DIAGNOSIS — R50.9 FEVER AND CHILLS: Primary | ICD-10-CM

## 2025-01-27 DIAGNOSIS — E87.6 HYPOKALEMIA: ICD-10-CM

## 2025-01-27 DIAGNOSIS — I48.91 NEW ONSET ATRIAL FIBRILLATION (H): ICD-10-CM

## 2025-01-27 DIAGNOSIS — I48.91 ATRIAL FIBRILLATION, UNSPECIFIED TYPE (H): ICD-10-CM

## 2025-01-27 PROBLEM — I10 ESSENTIAL HYPERTENSION: Status: ACTIVE | Noted: 2019-02-27

## 2025-01-27 PROBLEM — N18.30 CHRONIC KIDNEY DISEASE, STAGE 3 (H): Status: ACTIVE | Noted: 2021-06-17

## 2025-01-27 PROBLEM — F43.10 POSTTRAUMATIC STRESS DISORDER: Status: ACTIVE | Noted: 2024-06-06

## 2025-01-27 LAB
ANION GAP SERPL CALCULATED.3IONS-SCNC: 12 MMOL/L (ref 7–15)
ATRIAL RATE - MUSE: NORMAL BPM
BASOPHILS # BLD AUTO: 0 10E3/UL (ref 0–0.2)
BASOPHILS NFR BLD AUTO: 0 %
BUN SERPL-MCNC: 16.4 MG/DL (ref 8–23)
CALCIUM SERPL-MCNC: 8.8 MG/DL (ref 8.8–10.4)
CHLORIDE SERPL-SCNC: 104 MMOL/L (ref 98–107)
CREAT SERPL-MCNC: 0.94 MG/DL (ref 0.51–0.95)
DIASTOLIC BLOOD PRESSURE - MUSE: NORMAL MMHG
EGFRCR SERPLBLD CKD-EPI 2021: 64 ML/MIN/1.73M2
EOSINOPHIL # BLD AUTO: 0 10E3/UL (ref 0–0.7)
EOSINOPHIL NFR BLD AUTO: 0 %
ERYTHROCYTE [DISTWIDTH] IN BLOOD BY AUTOMATED COUNT: 14.4 % (ref 10–15)
FLUAV AG SPEC QL IA: NEGATIVE
FLUAV RNA SPEC QL NAA+PROBE: POSITIVE
FLUBV AG SPEC QL IA: NEGATIVE
FLUBV RNA RESP QL NAA+PROBE: NEGATIVE
GLUCOSE SERPL-MCNC: 112 MG/DL (ref 70–99)
HCO3 SERPL-SCNC: 21 MMOL/L (ref 22–29)
HCT VFR BLD AUTO: 38.7 % (ref 35–47)
HGB BLD-MCNC: 12.4 G/DL (ref 11.7–15.7)
HOLD SPECIMEN: NORMAL
IMM GRANULOCYTES # BLD: 0 10E3/UL
IMM GRANULOCYTES NFR BLD: 0 %
INTERPRETATION ECG - MUSE: NORMAL
LYMPHOCYTES # BLD AUTO: 0.7 10E3/UL (ref 0.8–5.3)
LYMPHOCYTES NFR BLD AUTO: 10 %
MAGNESIUM SERPL-MCNC: 1.7 MG/DL (ref 1.7–2.3)
MAGNESIUM SERPL-MCNC: 1.7 MG/DL (ref 1.7–2.3)
MCH RBC QN AUTO: 29.1 PG (ref 26.5–33)
MCHC RBC AUTO-ENTMCNC: 32 G/DL (ref 31.5–36.5)
MCV RBC AUTO: 91 FL (ref 78–100)
MONOCYTES # BLD AUTO: 0.8 10E3/UL (ref 0–1.3)
MONOCYTES NFR BLD AUTO: 11 %
NEUTROPHILS # BLD AUTO: 5.6 10E3/UL (ref 1.6–8.3)
NEUTROPHILS NFR BLD AUTO: 79 %
NRBC # BLD AUTO: 0 10E3/UL
NRBC BLD AUTO-RTO: 0 /100
NT-PROBNP SERPL-MCNC: 4097 PG/ML (ref 0–900)
P AXIS - MUSE: NORMAL DEGREES
PLATELET # BLD AUTO: 142 10E3/UL (ref 150–450)
POTASSIUM SERPL-SCNC: 4.6 MMOL/L (ref 3.4–5.3)
PR INTERVAL - MUSE: NORMAL MS
QRS DURATION - MUSE: 92 MS
QT - MUSE: 318 MS
QTC - MUSE: 471 MS
R AXIS - MUSE: 7 DEGREES
RBC # BLD AUTO: 4.26 10E6/UL (ref 3.8–5.2)
RSV RNA SPEC NAA+PROBE: NEGATIVE
SARS-COV-2 RNA RESP QL NAA+PROBE: NEGATIVE
SARS-COV-2 RNA RESP QL NAA+PROBE: NEGATIVE
SODIUM SERPL-SCNC: 137 MMOL/L (ref 135–145)
SYSTOLIC BLOOD PRESSURE - MUSE: NORMAL MMHG
T AXIS - MUSE: 246 DEGREES
TROPONIN T SERPL HS-MCNC: 12 NG/L
TROPONIN T SERPL HS-MCNC: 13 NG/L
TSH SERPL DL<=0.005 MIU/L-ACNC: 0.7 UIU/ML (ref 0.3–4.2)
VENTRICULAR RATE- MUSE: 132 BPM
WBC # BLD AUTO: 7.1 10E3/UL (ref 4–11)

## 2025-01-27 PROCEDURE — 84484 ASSAY OF TROPONIN QUANT: CPT | Performed by: EMERGENCY MEDICINE

## 2025-01-27 PROCEDURE — 83880 ASSAY OF NATRIURETIC PEPTIDE: CPT | Performed by: EMERGENCY MEDICINE

## 2025-01-27 PROCEDURE — 82310 ASSAY OF CALCIUM: CPT | Performed by: EMERGENCY MEDICINE

## 2025-01-27 PROCEDURE — 250N000013 HC RX MED GY IP 250 OP 250 PS 637: Performed by: INTERNAL MEDICINE

## 2025-01-27 PROCEDURE — 96374 THER/PROPH/DIAG INJ IV PUSH: CPT

## 2025-01-27 PROCEDURE — 96375 TX/PRO/DX INJ NEW DRUG ADDON: CPT

## 2025-01-27 PROCEDURE — 210N000001 HC R&B IMCU HEART CARE

## 2025-01-27 PROCEDURE — 250N000011 HC RX IP 250 OP 636: Performed by: INTERNAL MEDICINE

## 2025-01-27 PROCEDURE — 93005 ELECTROCARDIOGRAM TRACING: CPT

## 2025-01-27 PROCEDURE — 84443 ASSAY THYROID STIM HORMONE: CPT | Performed by: INTERNAL MEDICINE

## 2025-01-27 PROCEDURE — 99223 1ST HOSP IP/OBS HIGH 75: CPT | Performed by: INTERNAL MEDICINE

## 2025-01-27 PROCEDURE — 250N000011 HC RX IP 250 OP 636: Performed by: EMERGENCY MEDICINE

## 2025-01-27 PROCEDURE — 80048 BASIC METABOLIC PNL TOTAL CA: CPT | Performed by: EMERGENCY MEDICINE

## 2025-01-27 PROCEDURE — 87804 INFLUENZA ASSAY W/OPTIC: CPT | Performed by: FAMILY MEDICINE

## 2025-01-27 PROCEDURE — 83735 ASSAY OF MAGNESIUM: CPT | Performed by: INTERNAL MEDICINE

## 2025-01-27 PROCEDURE — 71046 X-RAY EXAM CHEST 2 VIEWS: CPT | Mod: TC | Performed by: RADIOLOGY

## 2025-01-27 PROCEDURE — 250N000009 HC RX 250: Performed by: EMERGENCY MEDICINE

## 2025-01-27 PROCEDURE — 36415 COLL VENOUS BLD VENIPUNCTURE: CPT | Performed by: EMERGENCY MEDICINE

## 2025-01-27 PROCEDURE — 85025 COMPLETE CBC W/AUTO DIFF WBC: CPT | Performed by: EMERGENCY MEDICINE

## 2025-01-27 PROCEDURE — 87637 SARSCOV2&INF A&B&RSV AMP PRB: CPT | Performed by: EMERGENCY MEDICINE

## 2025-01-27 PROCEDURE — 36415 COLL VENOUS BLD VENIPUNCTURE: CPT | Performed by: INTERNAL MEDICINE

## 2025-01-27 PROCEDURE — 93000 ELECTROCARDIOGRAM COMPLETE: CPT | Performed by: FAMILY MEDICINE

## 2025-01-27 PROCEDURE — 87635 SARS-COV-2 COVID-19 AMP PRB: CPT | Performed by: FAMILY MEDICINE

## 2025-01-27 PROCEDURE — 258N000003 HC RX IP 258 OP 636: Performed by: EMERGENCY MEDICINE

## 2025-01-27 PROCEDURE — 250N000013 HC RX MED GY IP 250 OP 250 PS 637: Performed by: EMERGENCY MEDICINE

## 2025-01-27 PROCEDURE — 99215 OFFICE O/P EST HI 40 MIN: CPT | Performed by: FAMILY MEDICINE

## 2025-01-27 PROCEDURE — 99285 EMERGENCY DEPT VISIT HI MDM: CPT | Mod: 25

## 2025-01-27 PROCEDURE — 94640 AIRWAY INHALATION TREATMENT: CPT

## 2025-01-27 PROCEDURE — 85041 AUTOMATED RBC COUNT: CPT | Performed by: EMERGENCY MEDICINE

## 2025-01-27 RX ORDER — LIDOCAINE 40 MG/G
CREAM TOPICAL
Status: ACTIVE | OUTPATIENT
Start: 2025-01-27

## 2025-01-27 RX ORDER — LIDOCAINE 40 MG/G
CREAM TOPICAL
Status: DISCONTINUED | OUTPATIENT
Start: 2025-01-27 | End: 2025-01-27

## 2025-01-27 RX ORDER — METHYLPREDNISOLONE SODIUM SUCCINATE 125 MG/2ML
60 INJECTION INTRAMUSCULAR; INTRAVENOUS ONCE
Status: COMPLETED | OUTPATIENT
Start: 2025-01-27 | End: 2025-01-27

## 2025-01-27 RX ORDER — OSELTAMIVIR PHOSPHATE 75 MG/1
75 CAPSULE ORAL ONCE
Status: COMPLETED | OUTPATIENT
Start: 2025-01-27 | End: 2025-01-27

## 2025-01-27 RX ORDER — MONTELUKAST SODIUM 10 MG/1
10 TABLET ORAL AT BEDTIME
Status: DISPENSED | OUTPATIENT
Start: 2025-01-27

## 2025-01-27 RX ORDER — SPIRONOLACTONE 100 MG/1
100 TABLET, FILM COATED ORAL DAILY
Status: ON HOLD | COMMUNITY

## 2025-01-27 RX ORDER — ONDANSETRON 2 MG/ML
4 INJECTION INTRAMUSCULAR; INTRAVENOUS EVERY 6 HOURS PRN
Status: ACTIVE | OUTPATIENT
Start: 2025-01-27

## 2025-01-27 RX ORDER — ACETAMINOPHEN 650 MG/1
650 SUPPOSITORY RECTAL EVERY 4 HOURS PRN
Status: ACTIVE | OUTPATIENT
Start: 2025-01-27

## 2025-01-27 RX ORDER — DILTIAZEM HYDROCHLORIDE 5 MG/ML
25 INJECTION INTRAVENOUS ONCE
Status: COMPLETED | OUTPATIENT
Start: 2025-01-27 | End: 2025-01-27

## 2025-01-27 RX ORDER — SIMVASTATIN 10 MG
10 TABLET ORAL AT BEDTIME
Status: DISPENSED | OUTPATIENT
Start: 2025-01-27

## 2025-01-27 RX ORDER — IPRATROPIUM BROMIDE AND ALBUTEROL SULFATE 2.5; .5 MG/3ML; MG/3ML
3 SOLUTION RESPIRATORY (INHALATION) EVERY 4 HOURS PRN
Status: DISCONTINUED | OUTPATIENT
Start: 2025-01-27 | End: 2025-01-28

## 2025-01-27 RX ORDER — NITROGLYCERIN 0.4 MG/1
0.4 TABLET SUBLINGUAL EVERY 5 MIN PRN
Status: DISCONTINUED | OUTPATIENT
Start: 2025-01-27 | End: 2025-01-29

## 2025-01-27 RX ORDER — ONDANSETRON 4 MG/1
4 TABLET, ORALLY DISINTEGRATING ORAL EVERY 6 HOURS PRN
Status: ACTIVE | OUTPATIENT
Start: 2025-01-27

## 2025-01-27 RX ORDER — DILTIAZEM HYDROCHLORIDE 30 MG/1
30 TABLET, FILM COATED ORAL EVERY 6 HOURS SCHEDULED
Status: DISCONTINUED | OUTPATIENT
Start: 2025-01-28 | End: 2025-01-28

## 2025-01-27 RX ORDER — CALCIUM CARBONATE 500 MG/1
1000 TABLET, CHEWABLE ORAL 4 TIMES DAILY PRN
Status: ACTIVE | OUTPATIENT
Start: 2025-01-27

## 2025-01-27 RX ORDER — OSELTAMIVIR PHOSPHATE 75 MG/1
75 CAPSULE ORAL 2 TIMES DAILY
Status: DISPENSED | OUTPATIENT
Start: 2025-01-28

## 2025-01-27 RX ORDER — ACETAMINOPHEN 325 MG/1
650 TABLET ORAL EVERY 4 HOURS PRN
Status: ACTIVE | OUTPATIENT
Start: 2025-01-27

## 2025-01-27 RX ORDER — FLUTICASONE FUROATE AND VILANTEROL 200; 25 UG/1; UG/1
1 POWDER RESPIRATORY (INHALATION) DAILY
Status: DISPENSED | OUTPATIENT
Start: 2025-01-28

## 2025-01-27 RX ORDER — MAGNESIUM OXIDE 400 MG/1
400 TABLET ORAL EVERY 4 HOURS
Status: COMPLETED | OUTPATIENT
Start: 2025-01-27 | End: 2025-01-28

## 2025-01-27 RX ORDER — AMOXICILLIN 250 MG
2 CAPSULE ORAL 2 TIMES DAILY PRN
Status: ACTIVE | OUTPATIENT
Start: 2025-01-27

## 2025-01-27 RX ORDER — LEVALBUTEROL INHALATION SOLUTION 1.25 MG/3ML
1.25 SOLUTION RESPIRATORY (INHALATION) ONCE
Status: COMPLETED | OUTPATIENT
Start: 2025-01-27 | End: 2025-01-27

## 2025-01-27 RX ORDER — AMLODIPINE BESYLATE 5 MG/1
5 TABLET ORAL DAILY
Status: DISCONTINUED | OUTPATIENT
Start: 2025-01-28 | End: 2025-01-29

## 2025-01-27 RX ORDER — HEPARIN SODIUM 10000 [USP'U]/100ML
0-5000 INJECTION, SOLUTION INTRAVENOUS CONTINUOUS
Status: DISCONTINUED | OUTPATIENT
Start: 2025-01-27 | End: 2025-01-28

## 2025-01-27 RX ORDER — AMOXICILLIN 250 MG
1 CAPSULE ORAL 2 TIMES DAILY PRN
Status: ACTIVE | OUTPATIENT
Start: 2025-01-27

## 2025-01-27 RX ORDER — OMEGA-3 FATTY ACIDS/FISH OIL 300-1000MG
200 CAPSULE ORAL PRN
Status: ON HOLD | COMMUNITY

## 2025-01-27 RX ORDER — LEVALBUTEROL INHALATION SOLUTION 1.25 MG/3ML
1.25 SOLUTION RESPIRATORY (INHALATION) EVERY 6 HOURS PRN
Status: DISPENSED | OUTPATIENT
Start: 2025-01-27

## 2025-01-27 RX ORDER — FUROSEMIDE 10 MG/ML
40 INJECTION INTRAMUSCULAR; INTRAVENOUS ONCE
Status: COMPLETED | OUTPATIENT
Start: 2025-01-27 | End: 2025-01-27

## 2025-01-27 RX ADMIN — FUROSEMIDE 40 MG: 10 INJECTION, SOLUTION INTRAMUSCULAR; INTRAVENOUS at 15:38

## 2025-01-27 RX ADMIN — HEPARIN SODIUM 1200 UNITS/HR: 10000 INJECTION, SOLUTION INTRAVENOUS at 21:37

## 2025-01-27 RX ADMIN — DILTIAZEM HYDROCHLORIDE 30 MG: 30 TABLET, FILM COATED ORAL at 23:37

## 2025-01-27 RX ADMIN — SIMVASTATIN 10 MG: 10 TABLET, FILM COATED ORAL at 23:35

## 2025-01-27 RX ADMIN — DILTIAZEM HYDROCHLORIDE 5 MG/HR: 5 INJECTION, SOLUTION INTRAVENOUS at 16:35

## 2025-01-27 RX ADMIN — MONTELUKAST 10 MG: 10 TABLET, FILM COATED ORAL at 23:36

## 2025-01-27 RX ADMIN — LEVALBUTEROL HYDROCHLORIDE 1.25 MG: 1.25 SOLUTION RESPIRATORY (INHALATION) at 14:23

## 2025-01-27 RX ADMIN — OSELTAMIVIR PHOSPHATE 75 MG: 75 CAPSULE ORAL at 15:46

## 2025-01-27 RX ADMIN — DILTIAZEM HYDROCHLORIDE 25 MG: 5 INJECTION INTRAVENOUS at 14:23

## 2025-01-27 RX ADMIN — LEVALBUTEROL HYDROCHLORIDE 1.25 MG: 1.25 SOLUTION RESPIRATORY (INHALATION) at 16:47

## 2025-01-27 RX ADMIN — METHYLPREDNISOLONE SODIUM SUCCINATE 62.5 MG: 125 INJECTION, POWDER, FOR SOLUTION INTRAMUSCULAR; INTRAVENOUS at 15:46

## 2025-01-27 RX ADMIN — Medication 400 MG: at 23:36

## 2025-01-27 ASSESSMENT — COLUMBIA-SUICIDE SEVERITY RATING SCALE - C-SSRS
2. HAVE YOU ACTUALLY HAD ANY THOUGHTS OF KILLING YOURSELF IN THE PAST MONTH?: NO
1. IN THE PAST MONTH, HAVE YOU WISHED YOU WERE DEAD OR WISHED YOU COULD GO TO SLEEP AND NOT WAKE UP?: NO
6. HAVE YOU EVER DONE ANYTHING, STARTED TO DO ANYTHING, OR PREPARED TO DO ANYTHING TO END YOUR LIFE?: NO

## 2025-01-27 ASSESSMENT — ACTIVITIES OF DAILY LIVING (ADL)
ADLS_ACUITY_SCORE: 34
ADLS_ACUITY_SCORE: 41
ADLS_ACUITY_SCORE: 34
ADLS_ACUITY_SCORE: 41

## 2025-01-27 NOTE — PROGRESS NOTES
Patient de sats to 84 % when she cough and ambulated to the bathroom.  Now she is on oxygen 3 liters,saturations are 95% now.

## 2025-01-27 NOTE — ED PROVIDER NOTES
"  Emergency Department Note      History of Present Illness     Chief Complaint   Irregular Heart Beat    HPI   Arline Saini is a 74 year old female with hypertension presenting with several complaints.  She reports \"a couple days\" of symptoms that her and her family felt were likely due to influenza including cough, dyspnea, and fever up to 104  F last night.  She also describes diffuse myalgia and \"feels horrible\" without specific chest pain.  At urgent care she had a rapid influenza which was negative and negative CXR, but she was found to be in atrial fibrillation and referred to the emergency department.  She does not have a history of atrial fibrillation.  She has lower extremity edema which she feels is actually improved compared to 3 days ago.  She has not had vomiting or diarrhea.  She smoked intermittently for 20 to 25 years, but quit 20 years ago.  She lives alone and has no known sick contacts.    Independent Historian   Daughter as detailed above.  The patient prefers daughter to provide Thai interpretation.    Review of External Notes   She had a physical in 06/2024. She had CKD, hypertension, and asthma. Today, she went to urgent care for fever with \"respiratory symptoms.\" She was found to be in atrial fibrillation there. She was referred to the ED. They performed an influenza A/B which is negative. COVID is in process. CXR performed which was without pneumonia (see below).     EXAM: XR CHEST 2 VIEWS  LOCATION: [facility]  DATE: 1/27/2025  IMPRESSION: No acute cardiopulmonary disease. Stable borderline prominent cardiac silhouette.     Past Medical History     Medical History and Problem List   Past Medical History:   Diagnosis Date    Essential hypertension 2/27/2019    Obesity (BMI 35.0-39.9) with comorbidity (H) 2/27/2019     Medications   albuterol (PROAIR HFA/PROVENTIL HFA/VENTOLIN HFA) 108 (90 Base) MCG/ACT inhaler  albuterol (VENTOLIN HFA) 108 (90 Base) MCG/ACT " inhaler  amLODIPine (NORVASC) 5 MG tablet  bisoprolol (ZEBETA) 10 MG tablet  fluticasone-salmeterol (ADVAIR DISKUS) 500-50 MCG/ACT inhaler  ipratropium - albuterol 0.5 mg/2.5 mg/3 mL (DUONEB) 0.5-2.5 (3) MG/3ML neb solution  losartan (COZAAR) 100 MG tablet  montelukast (SINGULAIR) 10 MG tablet  omeprazole (PRILOSEC) 20 MG DR capsule  simvastatin (ZOCOR) 10 MG tablet    Surgical History   Past Surgical History:   Procedure Laterality Date    CHOLECYSTECTOMY  2018     Physical Exam     Patient Vitals for the past 24 hrs:   BP Temp Temp src Pulse Resp SpO2   01/27/25 1730 113/87 -- -- (!) 140 (!) 35 94 %   01/27/25 1721 (!) 138/101 -- -- (!) 133 (!) 46 95 %   01/27/25 1700 (!) 126/101 -- -- (!) 138 (!) 46 96 %   01/27/25 1649 -- -- -- (!) 149 (!) 32 96 %   01/27/25 1638 (!) 126/102 -- -- (!) 117 (!) 46 96 %   01/27/25 1634 (!) 126/102 -- -- (!) 115 (!) 45 96 %   01/27/25 1623 (!) 133/106 -- -- (!) 138 21 96 %   01/27/25 1608 -- -- -- (!) 112 (!) 42 99 %   01/27/25 1553 -- -- -- (!) 137 24 95 %   01/27/25 1538 (!) 118/91 -- -- (!) 114 (!) 47 95 %   01/27/25 1537 -- 100  F (37.8  C) Oral -- -- --   01/27/25 1500 (!) 118/91 100  F (37.8  C) -- 105 22 94 %   01/27/25 1325 -- -- -- -- -- 95 %   01/27/25 1323 133/74 98.3  F (36.8  C) Oral (!) 116 24 92 %     Physical Exam  General: Well-developed and well-nourished. Well appearing elderly [place] woman. Cooperative.  Head:  Atraumatic.  Eyes:  Conjunctivae, lids, and sclerae are normal.  Neck:  Supple. Normal range of motion.  CV:  Tachycardic rate and irregularly irregular rhythm. Normal heart sounds with no murmurs, rubs, or gallops detected.  Resp:  No respiratory distress.  Frequent cough.  End-expiratory wheezing.  No rales or rhonchi.  GI:  Soft. Non-distended. Non-tender.    MS:  Normal ROM.  1-2+ bilateral lower extremity edema.  Skin:  Warm. Non-diaphoretic. No pallor.  Neuro:  Awake. A&Ox3. Normal strength.  Psych: Normal mood and affect. Normal speech.  Vitals  reviewed.    Diagnostics     Lab Results   Labs Ordered and Resulted from Time of ED Arrival to Time of ED Departure   BASIC METABOLIC PANEL - Abnormal       Result Value    Sodium 137      Potassium 4.6      Chloride 104      Carbon Dioxide (CO2) 21 (*)     Anion Gap 12      Urea Nitrogen 16.4      Creatinine 0.94      GFR Estimate 64      Calcium 8.8      Glucose 112 (*)    NT PROBNP INPATIENT - Abnormal    N terminal Pro BNP Inpatient 4,097 (*)    INFLUENZA A/B, RSV AND SARS-COV2 PCR - Abnormal    Influenza A PCR Positive (*)     Influenza B PCR Negative      RSV PCR Negative      SARS CoV2 PCR Negative     CBC WITH PLATELETS AND DIFFERENTIAL - Abnormal    WBC Count 7.1      RBC Count 4.26      Hemoglobin 12.4      Hematocrit 38.7      MCV 91      MCH 29.1      MCHC 32.0      RDW 14.4      Platelet Count 142 (*)     % Neutrophils 79      % Lymphocytes 10      % Monocytes 11      % Eosinophils 0      % Basophils 0      % Immature Granulocytes 0      NRBCs per 100 WBC 0      Absolute Neutrophils 5.6      Absolute Lymphocytes 0.7 (*)     Absolute Monocytes 0.8      Absolute Eosinophils 0.0      Absolute Basophils 0.0      Absolute Immature Granulocytes 0.0      Absolute NRBCs 0.0     TROPONIN T, HIGH SENSITIVITY - Normal    Troponin T, High Sensitivity 13     TROPONIN T, HIGH SENSITIVITY     EKG  Indication: tachycardia, dyspnea  Time: 1329  Rate 132 bpm. QRS duration 92. QT/QTc 318/471.   Atrial fibrillation with rapid ventricular response  ST&T wave abnormality, consider inferolateral ischemia  Abnormal ECG   No acute ST changes.  Atrial fibrillation replaces sinus bradycardia and ST-T wave changes laterally are new as compared to prior, dated 11/7/23.  ED Course      Medications Administered   Medications   diltiazem (CARDIZEM) 125 mg in sodium chloride 0.9 % 125 mL infusion (5 mg/hr Intravenous $New Bag 1/27/25 1632)   levalbuterol (XOPENEX) neb solution 1.25 mg (1.25 mg Nebulization $Given 1/27/25 1647)    levalbuterol (XOPENEX) neb solution 1.25 mg (1.25 mg Nebulization $Given 1/27/25 1423)   diltiazem (CARDIZEM) injection 25 mg (25 mg Intravenous $Given 1/27/25 1423)   furosemide (LASIX) injection 40 mg (40 mg Intravenous $Given 1/27/25 1538)   methylPREDNISolone Na Suc (solu-MEDROL) injection 62.5 mg (62.5 mg Intravenous $Given 1/27/25 1546)   oseltamivir (TAMIFLU) capsule 75 mg (75 mg Oral $Given 1/27/25 1546)     Discussion of Management   Admitting Hospitalist, Dr. Hill    ED Course   ED Course as of 01/27/25 1757 Mon Jan 27, 2025   1348 I obtained the patient's history and examined as noted above. Her daughter is on the phone and serves as the patient's . Patient denies wanting a professional .     1730 I spoke with the patient and her daughter again and answered all their questions.   1754 I discussed patient's case with Dr. Hill, hospitalist, who accepts admission.  I evaluated the patient.  Heart rates around 120 bpm but she is up to the bedside commode.     Additional Documentation  Social Determinants of Health: Supportive daughter.    Medical Decision Making / Diagnosis   JASON Nunn is a 74 year old woman presenting with a couple of days of cough, dyspnea, fever, and myalgia.  This took her to urgent care where she had a negative rapid influenza and chest x-ray but was found to be in atrial fibrillation and referred to the emergency department.  She does have lower extremity edema though I am uncertain after interview the chronicity of this.  On exam she is generally well-appearing with irregularly irregular tachycardia and frequent cough.  She also has end expiratory wheezing and 1-2+ bilateral lower extremity edema.    EKG confirms atrial fibrillation with RVR for which she was given a Cardizem bolus. This is her first episode of atrial fibrillation. Cardizem did improve her rate, but it was transient so I initiated a Cardizem infusion.  BNP is elevated at 4097, as  expected, and for which she was given Lasix and did start to have diuresis.  We did have a hard time controlling her heart rate in the emergency department as she refused a pure wick and insisted she got up to the commode each time she needed to urinate.  She had a negative chest x-ray at urgent care but influenza A testing is positive here which explains her symptoms.    The remainder of her laboratory studies are reassuring including initial troponin of 13.  I did give her 2 Xopenex nebulizers for her expiratory wheezing.  She is a former smoker and reports a history of asthma.  As such, I gave her a dose of Solu-Medrol for presumptive exacerbation.  She was also given Tamiflu.  She clearly requires hospitalization for attention to these issues.  I updated her and her daughter and answered all their  questions.  I discussed patient's case with Dr. Hill, hospitalist, who accepts admission and has no further orders.    Disposition   The patient was admitted to the hospital.     Diagnosis     ICD-10-CM    1. Influenza A  J10.1       2. Atrial fibrillation with RVR (H)  I48.91       3. Elevated brain natriuretic peptide (BNP) level  R79.89       4. New onset atrial fibrillation (H)  I48.91            Scribe Disclosure:  I, Ana Lilia Tara, am serving as a scribe at 1:37 PM on 1/27/2025 to document services personally performed by Debbie Murdock MD based on my observations and the provider's statements to me.           Debbie Murdock MD  01/28/25 1038

## 2025-01-27 NOTE — ED NOTES
[facility]  ED Nurse Handoff Report    ED Chief complaint: Irregular Heart Beat      ED Diagnosis:   Final diagnoses:   None       Code Status: Full Code    Allergies: No Known Allergies    Patient Story:  Send by his clinic here for Afib, flu, cough, shortness of breath and leg swelling.  Focused Assessment:   Iv insertion, oxygen 2 liters, nebulizer treatment and cardizem    Treatments and/or interventions provided:  02, nebulizer  Patient's response to treatments and/or interventions: Responded well    To be done/followed up on inpatient unit:  Hospitalist orders    Does this patient have any cognitive concerns?:  None.    Patient is [place]ine[place] Speaker, does not speak English.    Activity level - Baseline/Home:  Stand with Assist  Activity Level - Current:   Stand with Assist    Patient's Preferred language: Russian   Needed?: Yes    Isolation: COVID r/o and special precautions  Infection: Not Applicable  Patient tested for COVID 19 prior to admission: YES  Bariatric?: Yes    Vital Signs:   Vitals:    01/27/25 1323   BP: 133/74   Pulse: (!) 116   Resp: 24   Temp: 98.3  F (36.8  C)   TempSrc: Oral   SpO2: 95%       Cardiac Rhythm:Cardiac Rhythm: Atrial fibrillation    Was the PSS-3 completed:    What interventions are required if any?               Family Comments: OBS brochure/video discussed/provided to patient/family: Yes              Name of person given brochure if not patient:              Relationship to patient:    For the majority of the shift this patient's behavior was Green.   Behavioral interventions performed were     ED NURSE PHONE NUMBER:

## 2025-01-27 NOTE — ED TRIAGE NOTES
Pt brought from clinic because she has new onset a fib and poss. Heart failure     Triage Assessment (Adult)       Row Name 01/27/25 1325          Triage Assessment    Airway WDL WDL        Skin Circulation/Temperature WDL    Skin Circulation/Temperature WDL WDL        Cardiac WDL    Cardiac WDL rhythm     Cardiac Rhythm Atrial fibrillation        Peripheral/Neurovascular WDL    Peripheral Neurovascular WDL WDL        Cognitive/Neuro/Behavioral WDL    Cognitive/Neuro/Behavioral WDL WDL

## 2025-01-27 NOTE — PROGRESS NOTES
Assessment & Plan     Fever and chills  - COVID-19 Virus (Coronavirus) by PCR Nose    Acute cough  - Influenza A & B Antigen - Clinic Collect  - XR Chest 2 Views    Racing heart beat  - EKG 12-lead complete w/read - Clinics    Atrial fibrillation, unspecified type (H)    Patient presenting with respiratory symptoms found here to be in atrial fibrillation with borderline soft blood pressure compared to her usual, she is treated for hypertension  Chest x-ray per my read compared to imaging 16 months ago heart may be slightly increased in size may be suggestive of early signs of heart failure overall the patient shows no shortness of breath at rest but walking shows she does get easily winded ---with the permission of administration I personally walked the patient to the emergency room from internal medicine clinic     Chart review does not discuss prior A-Fib diagnosis. Quite possibly fluid overloaded precipitating this event.     Informed [facility] ED regarding this case.     50 minutes spent on the date of the encounter doing chart review, history and exam, documentation and further activities per the note.     Nelson Brooks MD   [place] [facility]    Dalton Nunn is a 74 year old female who presents to clinic today for the following health issues:  Chief Complaint   Patient presents with    Urgent Care    URI     Pt presents to  clinic - Her Daughter is on the phone and is translating for patient   Cough, shallow breathing, SOB x 3 days -concerned about bronchitis/lungs infection   Fever and chills 103 F Tmax x 1 day  Took Ibuprofen 2 hours ago      HPI    Fever starting yesterday with respiratory symptoms in the last 2 to 3 days no known exposures to illnesses such as COVID or influenza.  No vomiting or diarrhea.  No formal history of lung disease was a smoker quit 20 years ago she is not on any inhalers.  She feels as if her heart is racing at times but no history of atrial fibrillation.  No  history of heart attack    Family member who is bilingual provides collateral information patient declines phone or video  services    Daughter Jessica who lives in the area was on the call during the visit       Patient Active Problem List    Diagnosis Date Noted    Posttraumatic stress disorder 06/06/2024     Priority: Medium    Dyslipidemia 06/06/2024     Priority: Medium    Cerebrovascular disease 06/06/2024     Priority: Medium    Moderate persistent asthma without complication 11/22/2023     Priority: Medium    Allergic bronchitis without complication 11/07/2023     Priority: Medium    Chronic kidney disease, stage 3 (H) 06/17/2021     Priority: Medium    External hemorrhoids 12/08/2020     Priority: Medium    Aortic root dilatation 05/23/2019     Priority: Medium    Essential hypertension 02/27/2019     Priority: Medium    Obesity (BMI 35.0-39.9) with comorbidity (H) 02/27/2019     Priority: Medium       Current Outpatient Medications   Medication Sig Dispense Refill    albuterol (PROAIR HFA/PROVENTIL HFA/VENTOLIN HFA) 108 (90 Base) MCG/ACT inhaler Inhale 2 puffs into the lungs every 6 hours as needed for shortness of breath, wheezing or cough 18 g 1    albuterol (VENTOLIN HFA) 108 (90 Base) MCG/ACT inhaler INHALE 2 PUFFS INTO THE LUNGS EVERY 6 HOURS AS NEEDED FOR SHORTNESS OF BREATH OR WHEEZING 18 g 1    amLODIPine (NORVASC) 5 MG tablet TAKE 1 TABLET(5 MG) BY MOUTH DAILY 90 tablet 1    benzonatate (TESSALON) 100 MG capsule Take 1-2 capsules by mouth up to 3 x a day as needed with food 45 capsule 0    bisoprolol (ZEBETA) 10 MG tablet TAKE 1 TABLET(10 MG) BY MOUTH DAILY 90 tablet 1    fluticasone-salmeterol (ADVAIR DISKUS) 500-50 MCG/ACT inhaler INHALE 1 PUFF INTO THE LUNGS EVERY 12 HOURS 180 each 0    ipratropium - albuterol 0.5 mg/2.5 mg/3 mL (DUONEB) 0.5-2.5 (3) MG/3ML neb solution TAKE 1 VIAL BY NEBULIZATION EVERY 6 HOURS AS NEEDED SHORTNESS OF BREATH, WHEEZING OR COUGH 90 mL 0    losartan  (COZAAR) 100 MG tablet TAKE 1 TABLET(100 MG) BY MOUTH DAILY 90 tablet 1    montelukast (SINGULAIR) 10 MG tablet TAKE 1 TABLET(10 MG) BY MOUTH AT BEDTIME 90 tablet 0    omeprazole (PRILOSEC) 20 MG DR capsule Take 1 capsule (20 mg) by mouth daily 90 capsule 2    simvastatin (ZOCOR) 10 MG tablet TAKE 1 TABLET(10 MG) BY MOUTH AT BEDTIME 90 tablet 1    amoxicillin-clavulanate (AUGMENTIN) 875-125 MG tablet Take 1 tablet by mouth 2 times daily. (Patient not taking: Reported on 1/27/2025) 14 tablet 0     No current facility-administered medications for this visit.           Objective    BP 95/57 (BP Location: Left arm, Patient Position: Sitting, Cuff Size: Adult Large)   Pulse (!) 125   Temp 99  F (37.2  C) (Tympanic)   Resp 26   Wt 108.1 kg (238 lb 6.4 oz)   SpO2 95%   Breastfeeding No   BMI 39.24 kg/m    Physical Exam   As noted above and including:   GEN: normal mentation, MMM  CV: tachycardic  Pulm: Wheezing apparent in lower lung fields and expiratory otherwise no obvious rhonchi or crackles.  Legs: mild 1+ swelling lower extremities  Results for orders placed or performed in visit on 01/27/25   XR Chest 2 Views     Status: None    Narrative    EXAM: XR CHEST 2 VIEWS  LOCATION: [facility]  DATE: 1/27/2025    INDICATION: Short of breath, chest discomfort. Cough and fever 2 days.  COMPARISON: 10/26/2023.      Impression    IMPRESSION: No acute cardiopulmonary disease. Stable borderline prominent cardiac silhouette.                Results for orders placed or performed in visit on 01/27/25   Influenza A & B Antigen - Clinic Collect     Status: Normal    Specimen: Nose; Swab   Result Value Ref Range    Influenza A antigen Negative Negative    Influenza B antigen Negative Negative    Narrative    Test results must be correlated with clinical data. If necessary, results should be confirmed by a molecular assay or viral culture.                     The use of Dragon/Perfectore dictation services may  have been used to construct the content in this note; any grammatical or spelling errors are non-intentional. Please contact the author of this note directly if you are in need of any clarification.

## 2025-01-27 NOTE — PATIENT INSTRUCTIONS
[facility] Emergency Room  You are being referred to the emergency room for:       1) Atrial fibrillation  ( new presentation)  2) concerns for mild dehydration

## 2025-01-28 ENCOUNTER — APPOINTMENT (OUTPATIENT)
Dept: CARDIOLOGY | Facility: CLINIC | Age: 75
End: 2025-01-28
Attending: INTERNAL MEDICINE
Payer: COMMERCIAL

## 2025-01-28 ENCOUNTER — APPOINTMENT (OUTPATIENT)
Dept: PHYSICAL THERAPY | Facility: CLINIC | Age: 75
End: 2025-01-28
Attending: INTERNAL MEDICINE
Payer: COMMERCIAL

## 2025-01-28 LAB
ANION GAP SERPL CALCULATED.3IONS-SCNC: 12 MMOL/L (ref 7–15)
BUN SERPL-MCNC: 25.2 MG/DL (ref 8–23)
CALCIUM SERPL-MCNC: 8.7 MG/DL (ref 8.8–10.4)
CHLORIDE SERPL-SCNC: 104 MMOL/L (ref 98–107)
CREAT SERPL-MCNC: 0.96 MG/DL (ref 0.51–0.95)
EGFRCR SERPLBLD CKD-EPI 2021: 62 ML/MIN/1.73M2
ERYTHROCYTE [DISTWIDTH] IN BLOOD BY AUTOMATED COUNT: 14.2 % (ref 10–15)
ERYTHROCYTE [DISTWIDTH] IN BLOOD BY AUTOMATED COUNT: 14.3 % (ref 10–15)
GLUCOSE SERPL-MCNC: 132 MG/DL (ref 70–99)
HCO3 SERPL-SCNC: 23 MMOL/L (ref 22–29)
HCT VFR BLD AUTO: 35.4 % (ref 35–47)
HCT VFR BLD AUTO: 38.7 % (ref 35–47)
HGB BLD-MCNC: 11.7 G/DL (ref 11.7–15.7)
HGB BLD-MCNC: 13 G/DL (ref 11.7–15.7)
LVEF ECHO: NORMAL
MAGNESIUM SERPL-MCNC: 1.9 MG/DL (ref 1.7–2.3)
MCH RBC QN AUTO: 29.5 PG (ref 26.5–33)
MCH RBC QN AUTO: 29.8 PG (ref 26.5–33)
MCHC RBC AUTO-ENTMCNC: 33.1 G/DL (ref 31.5–36.5)
MCHC RBC AUTO-ENTMCNC: 33.6 G/DL (ref 31.5–36.5)
MCV RBC AUTO: 89 FL (ref 78–100)
MCV RBC AUTO: 89 FL (ref 78–100)
PLATELET # BLD AUTO: 123 10E3/UL (ref 150–450)
PLATELET # BLD AUTO: 167 10E3/UL (ref 150–450)
POTASSIUM SERPL-SCNC: 4.1 MMOL/L (ref 3.4–5.3)
RBC # BLD AUTO: 3.97 10E6/UL (ref 3.8–5.2)
RBC # BLD AUTO: 4.36 10E6/UL (ref 3.8–5.2)
SODIUM SERPL-SCNC: 139 MMOL/L (ref 135–145)
UFH PPP CHRO-ACNC: 0.82 IU/ML
UFH PPP CHRO-ACNC: 1.05 IU/ML
WBC # BLD AUTO: 4.8 10E3/UL (ref 4–11)
WBC # BLD AUTO: 6.9 10E3/UL (ref 4–11)

## 2025-01-28 PROCEDURE — 99233 SBSQ HOSP IP/OBS HIGH 50: CPT | Performed by: STUDENT IN AN ORGANIZED HEALTH CARE EDUCATION/TRAINING PROGRAM

## 2025-01-28 PROCEDURE — 93306 TTE W/DOPPLER COMPLETE: CPT | Mod: 26 | Performed by: INTERNAL MEDICINE

## 2025-01-28 PROCEDURE — 999N000157 HC STATISTIC RCP TIME EA 10 MIN

## 2025-01-28 PROCEDURE — 250N000013 HC RX MED GY IP 250 OP 250 PS 637: Performed by: INTERNAL MEDICINE

## 2025-01-28 PROCEDURE — 210N000001 HC R&B IMCU HEART CARE

## 2025-01-28 PROCEDURE — 97530 THERAPEUTIC ACTIVITIES: CPT | Mod: GP

## 2025-01-28 PROCEDURE — 250N000013 HC RX MED GY IP 250 OP 250 PS 637: Performed by: STUDENT IN AN ORGANIZED HEALTH CARE EDUCATION/TRAINING PROGRAM

## 2025-01-28 PROCEDURE — 36415 COLL VENOUS BLD VENIPUNCTURE: CPT | Performed by: INTERNAL MEDICINE

## 2025-01-28 PROCEDURE — 97161 PT EVAL LOW COMPLEX 20 MIN: CPT | Mod: GP

## 2025-01-28 PROCEDURE — 85014 HEMATOCRIT: CPT | Performed by: INTERNAL MEDICINE

## 2025-01-28 PROCEDURE — 80048 BASIC METABOLIC PNL TOTAL CA: CPT | Performed by: INTERNAL MEDICINE

## 2025-01-28 PROCEDURE — 255N000002 HC RX 255 OP 636: Performed by: INTERNAL MEDICINE

## 2025-01-28 PROCEDURE — 83735 ASSAY OF MAGNESIUM: CPT | Performed by: INTERNAL MEDICINE

## 2025-01-28 PROCEDURE — 99223 1ST HOSP IP/OBS HIGH 75: CPT | Mod: 25 | Performed by: INTERNAL MEDICINE

## 2025-01-28 PROCEDURE — 85520 HEPARIN ASSAY: CPT | Performed by: INTERNAL MEDICINE

## 2025-01-28 PROCEDURE — 250N000009 HC RX 250: Performed by: STUDENT IN AN ORGANIZED HEALTH CARE EDUCATION/TRAINING PROGRAM

## 2025-01-28 PROCEDURE — 250N000009 HC RX 250: Performed by: INTERNAL MEDICINE

## 2025-01-28 PROCEDURE — 258N000003 HC RX IP 258 OP 636: Performed by: INTERNAL MEDICINE

## 2025-01-28 PROCEDURE — 94640 AIRWAY INHALATION TREATMENT: CPT

## 2025-01-28 RX ORDER — MAGNESIUM OXIDE 400 MG/1
400 TABLET ORAL EVERY 4 HOURS
Status: COMPLETED | OUTPATIENT
Start: 2025-01-28 | End: 2025-01-28

## 2025-01-28 RX ORDER — IPRATROPIUM BROMIDE AND ALBUTEROL SULFATE 2.5; .5 MG/3ML; MG/3ML
3 SOLUTION RESPIRATORY (INHALATION)
Status: DISCONTINUED | OUTPATIENT
Start: 2025-01-28 | End: 2025-01-28

## 2025-01-28 RX ORDER — METOPROLOL TARTRATE 50 MG
50 TABLET ORAL 2 TIMES DAILY
Status: DISCONTINUED | OUTPATIENT
Start: 2025-01-28 | End: 2025-01-29

## 2025-01-28 RX ORDER — METOPROLOL TARTRATE 25 MG/1
25 TABLET, FILM COATED ORAL 2 TIMES DAILY
Status: DISCONTINUED | OUTPATIENT
Start: 2025-01-28 | End: 2025-01-28

## 2025-01-28 RX ORDER — PANTOPRAZOLE SODIUM 40 MG/1
40 TABLET, DELAYED RELEASE ORAL DAILY
Status: DISPENSED | OUTPATIENT
Start: 2025-01-28

## 2025-01-28 RX ADMIN — AMLODIPINE BESYLATE 5 MG: 5 TABLET ORAL at 09:13

## 2025-01-28 RX ADMIN — Medication 400 MG: at 06:43

## 2025-01-28 RX ADMIN — OSELTAMIVIR PHOSPHATE 75 MG: 75 CAPSULE ORAL at 06:43

## 2025-01-28 RX ADMIN — Medication 400 MG: at 21:09

## 2025-01-28 RX ADMIN — RIVAROXABAN 20 MG: 20 TABLET, FILM COATED ORAL at 10:47

## 2025-01-28 RX ADMIN — PERFLUTREN 10 ML: 6.52 INJECTION, SUSPENSION INTRAVENOUS at 09:56

## 2025-01-28 RX ADMIN — IPRATROPIUM BROMIDE AND ALBUTEROL SULFATE 3 ML: .5; 3 SOLUTION RESPIRATORY (INHALATION) at 16:18

## 2025-01-28 RX ADMIN — DILTIAZEM HYDROCHLORIDE 30 MG: 30 TABLET, FILM COATED ORAL at 06:43

## 2025-01-28 RX ADMIN — DILTIAZEM HYDROCHLORIDE 5 MG/HR: 5 INJECTION, SOLUTION INTRAVENOUS at 03:45

## 2025-01-28 RX ADMIN — METOPROLOL TARTRATE 50 MG: 50 TABLET, FILM COATED ORAL at 21:09

## 2025-01-28 RX ADMIN — FLUTICASONE FUROATE AND VILANTEROL TRIFENATATE 1 PUFF: 200; 25 POWDER RESPIRATORY (INHALATION) at 10:46

## 2025-01-28 RX ADMIN — SIMVASTATIN 10 MG: 10 TABLET, FILM COATED ORAL at 21:09

## 2025-01-28 RX ADMIN — OSELTAMIVIR PHOSPHATE 75 MG: 75 CAPSULE ORAL at 21:09

## 2025-01-28 RX ADMIN — METOPROLOL TARTRATE 25 MG: 25 TABLET, FILM COATED ORAL at 10:47

## 2025-01-28 RX ADMIN — PANTOPRAZOLE SODIUM 40 MG: 40 TABLET, DELAYED RELEASE ORAL at 17:00

## 2025-01-28 RX ADMIN — MONTELUKAST 10 MG: 10 TABLET, FILM COATED ORAL at 21:09

## 2025-01-28 RX ADMIN — Medication 400 MG: at 18:34

## 2025-01-28 ASSESSMENT — ACTIVITIES OF DAILY LIVING (ADL)
ADLS_ACUITY_SCORE: 39
ADLS_ACUITY_SCORE: 42
ADLS_ACUITY_SCORE: 39
ADLS_ACUITY_SCORE: 40
ADLS_ACUITY_SCORE: 42
ADLS_ACUITY_SCORE: 40
ADLS_ACUITY_SCORE: 42
ADLS_ACUITY_SCORE: 38
ADLS_ACUITY_SCORE: 40
ADLS_ACUITY_SCORE: 39
ADLS_ACUITY_SCORE: 40
ADLS_ACUITY_SCORE: 39
ADLS_ACUITY_SCORE: 40
ADLS_ACUITY_SCORE: 42
ADLS_ACUITY_SCORE: 40
ADLS_ACUITY_SCORE: 42
ADLS_ACUITY_SCORE: 40
ADLS_ACUITY_SCORE: 40
ADLS_ACUITY_SCORE: 39
ADLS_ACUITY_SCORE: 39
ADLS_ACUITY_SCORE: 42

## 2025-01-28 NOTE — CONSULTS
"[facility]    Cardiology Consultation     Date of Admission:  1/27/2025    Assessment & Plan   Arline Saini is a 74 year old female who was admitted on 1/27/2025.    1.  Atrial fibrillation in the setting of influenza (CHADS2 Vasc of 3)  2.  Influenza A infection  3.  Obesity  4.  Essential hypertension  5.  Thrombocytopenia- Plt 218 in 06/24. Now 142>>123    Recommendation:   1.  Keep magnesium more than 2 and potassium over 4.  2.  Will focus on rate control.  Off dilt drip since morning. Switch to Po metoprolol.  3.  Stop heparin drip drip. Switch to xeralto 20 mg daily.   4.  Hoping for spontaneous conversion to NSR but if not, will perform BERNARDO/DCCV in a few days or DCCV without BERNARDO after 3 weeks of AC.    High complexity     Pedro Light MD, MD    Primary Care Physician   [facility]    Reason for Consult   Reason for consult: I was asked to evaluate this patient for atrial fibrillation.      History of Present Illness   Arline Saini is a 74 year old female with significant regarding for fever and cough for the last 3 days.  She was noted to be leukopenic and was directed to emergency department where she was noted to be in A-fib with RVR.  Initial EKG showed heart rate of 122 bpm.  She also tested positive for influenza.  She has no history of prior atrial fibrillation.  The risk factors include obesity and hypertension.    Past Medical History   Past Medical History:   Diagnosis Date    Essential hypertension 2/27/2019    Obesity (BMI 35.0-39.9) with comorbidity (H) 2/27/2019       Past Surgical History   Past Surgical History:   Procedure Laterality Date    CHOLECYSTECTOMY  2018       Prior to Admission Medications   Prior to Admission Medications   Prescriptions Last Dose Informant Patient Reported? Taking?   UNABLE TO FIND   Yes Yes   Sig: MEDICATION NAME: \"panangin\" - plain white pill, likely from [place]. Pt reports \"vitamin for heart.\" Unable to " confirm content but may have potassium/magnesium. Unclear if once or twice per day   albuterol (VENTOLIN HFA) 108 (90 Base) MCG/ACT inhaler   No Yes   Sig: INHALE 2 PUFFS INTO THE LUNGS EVERY 6 HOURS AS NEEDED FOR SHORTNESS OF BREATH OR WHEEZING   Patient taking differently: Inhale into the lungs. Initially reported as 2 puffs every 4 hours then later states 1 puff ~3x/day   amLODIPine (NORVASC) 5 MG tablet 1/27/2025  No Yes   Sig: TAKE 1 TABLET(5 MG) BY MOUTH DAILY   bisoprolol (ZEBETA) 10 MG tablet 1/26/2025  No Yes   Sig: TAKE 1 TABLET(10 MG) BY MOUTH DAILY   fluticasone-salmeterol (ADVAIR DISKUS) 500-50 MCG/ACT inhaler 1/27/2025  No Yes   Sig: INHALE 1 PUFF INTO THE LUNGS EVERY 12 HOURS   Patient taking differently: Inhale 1 puff into the lungs. 2-3x/day   ibuprofen (ADVIL/MOTRIN) 200 MG capsule   Yes Yes   Sig: Take 200 mg by mouth as needed for fever or other (pain).   ipratropium - albuterol 0.5 mg/2.5 mg/3 mL (DUONEB) 0.5-2.5 (3) MG/3ML neb solution   No Yes   Sig: TAKE 1 VIAL BY NEBULIZATION EVERY 6 HOURS AS NEEDED SHORTNESS OF BREATH, WHEEZING OR COUGH   losartan (COZAAR) 100 MG tablet 1/27/2025  No Yes   Sig: TAKE 1 TABLET(100 MG) BY MOUTH DAILY   montelukast (SINGULAIR) 10 MG tablet 1/26/2025  No Yes   Sig: TAKE 1 TABLET(10 MG) BY MOUTH AT BEDTIME   omeprazole (PRILOSEC) 20 MG DR capsule   No Yes   Sig: Take 1 capsule (20 mg) by mouth daily   Patient taking differently: Take 20 mg by mouth daily as needed.   simvastatin (ZOCOR) 10 MG tablet 1/26/2025  No Yes   Sig: TAKE 1 TABLET(10 MG) BY MOUTH AT BEDTIME   spironolactone (ALDACTONE) 100 MG tablet 1/26/2025  Yes Yes   Sig: Take 100 mg by mouth daily. Pt reports she started taking this own her own ~1 week ago. No recent fills in [employer]      Facility-Administered Medications: None     Current Facility-Administered Medications   Medication Dose Route Frequency Provider Last Rate Last Admin    acetaminophen (TYLENOL) tablet 650 mg  650 mg Oral Q4H PRN  Caleb Hill MD        Or    acetaminophen (TYLENOL) Suppository 650 mg  650 mg Rectal Q4H PRN Caleb Hill MD        amLODIPine (NORVASC) tablet 5 mg  5 mg Oral Daily Caleb Hill MD   5 mg at 01/28/25 0913    calcium carbonate (TUMS) chewable tablet 1,000 mg  1,000 mg Oral 4x Daily PRN Caleb Hill MD        diltiazem (CARDIZEM) 125 mg in sodium chloride 0.9 % 125 mL infusion  5-15 mg/hr Intravenous Continuous Caleb Hill MD   Paused at 01/28/25 0645    diltiazem (CARDIZEM) tablet 30 mg  30 mg Oral Q6H VIDAL Caleb Hill MD   30 mg at 01/28/25 0643    fluticasone-vilanterol (BREO ELLIPTA) 200-25 MCG/ACT inhaler 1 puff  1 puff Inhalation Daily Caleb Hill MD        heparin 25,000 units in 0.45% NaCl 250 mL ANTICOAGULANT infusion  0-5,000 Units/hr Intravenous Continuous Caleb Hill MD 6 mL/hr at 01/28/25 0912 600 Units/hr at 01/28/25 0912    ipratropium - albuterol 0.5 mg/2.5 mg/3 mL (DUONEB) neb solution 3 mL  3 mL Nebulization Q4H PRN Caleb Hill MD        levalbuterol (XOPENEX) neb solution 1.25 mg  1.25 mg Nebulization Q6H PRN Debbie Murdock MD   1.25 mg at 01/27/25 1647    lidocaine (LMX4) cream   Topical Q1H PRN Caleb Hill MD        lidocaine 1 % 0.1-1 mL  0.1-1 mL Other Q1H PRN Caleb Hill MD        montelukast (SINGULAIR) tablet 10 mg  10 mg Oral At Bedtime Caleb Hill MD   10 mg at 01/27/25 2336    nitroGLYcerin (NITROSTAT) sublingual tablet 0.4 mg  0.4 mg Sublingual Q5 Min PRN Caleb Hill MD        No anticoagulants IF patient has had acute trauma/surgery or recent intracranial, GI or urinary tract bleeding.    Other DOES NOT GO TO Caleb Lazar MD        ondansetron (ZOFRAN ODT) ODT tab 4 mg  4 mg Oral Q6H PRN Caleb Hill MD        Or    ondansetron (ZOFRAN) injection 4 mg  4 mg Intravenous Q6H PRN Caleb Hill MD        oseltamivir (TAMIFLU) capsule 75 mg  75 mg Oral BID Caleb Hill MD   75 mg at 01/28/25 0643    senna-docusate  (SENOKOT-S/PERICOLACE) 8.6-50 MG per tablet 1 tablet  1 tablet Oral BID PRN Caleb Hill MD        Or    senna-docusate (SENOKOT-S/PERICOLACE) 8.6-50 MG per tablet 2 tablet  2 tablet Oral BID PRN Caleb Hill MD        simvastatin (ZOCOR) tablet 10 mg  10 mg Oral At Bedtime Caleb Hill MD   10 mg at 01/27/25 2335    sodium chloride (PF) 0.9% PF flush 3 mL  3 mL Intracatheter Q8H Caleb Hill MD        sodium chloride (PF) 0.9% PF flush 3 mL  3 mL Intracatheter q1 min prn Caleb Hill MD         Current Facility-Administered Medications   Medication Dose Route Frequency Provider Last Rate Last Admin    acetaminophen (TYLENOL) tablet 650 mg  650 mg Oral Q4H PRN Caleb Hill MD        Or    acetaminophen (TYLENOL) Suppository 650 mg  650 mg Rectal Q4H PRN Caleb Hill MD        amLODIPine (NORVASC) tablet 5 mg  5 mg Oral Daily Caleb Hill MD   5 mg at 01/28/25 0913    calcium carbonate (TUMS) chewable tablet 1,000 mg  1,000 mg Oral 4x Daily PRN Caleb Hlil MD        diltiazem (CARDIZEM) 125 mg in sodium chloride 0.9 % 125 mL infusion  5-15 mg/hr Intravenous Continuous Caleb Hill MD   Paused at 01/28/25 0645    diltiazem (CARDIZEM) tablet 30 mg  30 mg Oral Q6H VIDAL Caleb Hill MD   30 mg at 01/28/25 0643    fluticasone-vilanterol (BREO ELLIPTA) 200-25 MCG/ACT inhaler 1 puff  1 puff Inhalation Daily Caleb Hill MD        heparin 25,000 units in 0.45% NaCl 250 mL ANTICOAGULANT infusion  0-5,000 Units/hr Intravenous Continuous Caleb Hill MD 6 mL/hr at 01/28/25 0912 600 Units/hr at 01/28/25 0912    ipratropium - albuterol 0.5 mg/2.5 mg/3 mL (DUONEB) neb solution 3 mL  3 mL Nebulization Q4H PRN Caleb Hill MD        levalbuterol (XOPENEX) neb solution 1.25 mg  1.25 mg Nebulization Q6H PRN Debbie Murdock MD   1.25 mg at 01/27/25 1647    lidocaine (LMX4) cream   Topical Q1H PRN Caleb Hill MD        lidocaine 1 % 0.1-1 mL  0.1-1 mL Other Q1H PRN Caleb Hill MD         montelukast (SINGULAIR) tablet 10 mg  10 mg Oral At Bedtime Caleb Hill MD   10 mg at 01/27/25 2336    nitroGLYcerin (NITROSTAT) sublingual tablet 0.4 mg  0.4 mg Sublingual Q5 Min PRN Caleb Hill MD        No anticoagulants IF patient has had acute trauma/surgery or recent intracranial, GI or urinary tract bleeding.    Other DOES NOT GO TO MAR Caleb Hill MD        ondansetron (ZOFRAN ODT) ODT tab 4 mg  4 mg Oral Q6H PRN Caleb Hill MD        Or    ondansetron (ZOFRAN) injection 4 mg  4 mg Intravenous Q6H PRN Caleb Hill MD        oseltamivir (TAMIFLU) capsule 75 mg  75 mg Oral BID Caleb Hill MD   75 mg at 01/28/25 0643    senna-docusate (SENOKOT-S/PERICOLACE) 8.6-50 MG per tablet 1 tablet  1 tablet Oral BID PRN Caleb Hill MD        Or    senna-docusate (SENOKOT-S/PERICOLACE) 8.6-50 MG per tablet 2 tablet  2 tablet Oral BID PRN Caleb Hill MD        simvastatin (ZOCOR) tablet 10 mg  10 mg Oral At Bedtime Caleb Hill MD   10 mg at 01/27/25 2335    sodium chloride (PF) 0.9% PF flush 3 mL  3 mL Intracatheter Q8H Caleb Hill MD        sodium chloride (PF) 0.9% PF flush 3 mL  3 mL Intracatheter q1 min prn Caleb Hill MD         Allergies   No Known Allergies    Social History    reports that she quit smoking about 6 years ago. Her smoking use included cigarettes. She has never used smokeless tobacco. She reports that she does not drink alcohol and does not use drugs.      Family History   I have reviewed this patient's family history and updated it with pertinent information if needed.  Family History   Problem Relation Age of Onset    Cerebrovascular Disease Mother     Hypertension Mother     Hyperlipidemia Mother     Obesity Mother     Cerebrovascular Disease Sister 40    Unknown/Adopted Father           Review of Systems   A comprehensive review of system was performed and is negative other than that noted in the HPI or here.     Physical Exam   Vital Signs with  "Ranges  Temp:  [98  F (36.7  C)-100  F (37.8  C)] 98  F (36.7  C)  Pulse:  [] 110  Resp:  [16-48] 16  BP: ()/() 132/87  SpO2:  [89 %-100 %] 100 %  Wt Readings from Last 4 Encounters:   01/28/25 104.8 kg (231 lb)   01/27/25 108.1 kg (238 lb 6.4 oz)   10/22/24 111.9 kg (246 lb 11.2 oz)   06/06/24 106.1 kg (234 lb)     I/O last 3 completed shifts:  In: 360 [P.O.:360]  Out: 315 [Urine:315]      Vitals: /87   Pulse (!) 110   Temp 98  F (36.7  C) (Oral)   Resp 16   Wt 104.8 kg (231 lb)   SpO2 100%   BMI 38.03 kg/m      Physical Exam:   General - Alert and oriented to time place and person in no acute distress  Eyes - No scleral icterus  HEENT - Neck supple, moist mucous membranes  Cardiovascular - IRR  Extremities - There is no edema  Respiratory - B/l crackles  Skin - No pallor or cyanosis  Gastrointestinal - Non tender and non distended without rebound or guarding  Psych - Appropriate affect   Neurological - No gross motor neurological focal deficits    No lab results found in last 7 days.    Invalid input(s): \"TROPONINIES\"    Recent Labs   Lab 01/28/25  0823 01/27/25  1429   WBC 4.8 7.1   HGB 11.7 12.4   MCV 89 91   * 142*    137   POTASSIUM 4.1 4.6   CHLORIDE 104 104   CO2 23 21*   BUN 25.2* 16.4   CR 0.96* 0.94   GFRESTIMATED 62 64   ANIONGAP 12 12   YOANDY 8.7* 8.8   * 112*     Recent Labs   Lab Test 06/06/24  1035 05/31/23  0935   CHOL 198 203*   HDL 67 64   * 129*   TRIG 88 52     Recent Labs   Lab 01/28/25  0823 01/27/25  1429   WBC 4.8 7.1   HGB 11.7 12.4   HCT 35.4 38.7   MCV 89 91   * 142*     No results for input(s): \"PH\", \"PHV\", \"PO2\", \"PO2V\", \"SAT\", \"PCO2\", \"PCO2V\", \"HCO3\", \"HCO3V\" in the last 168 hours.  Recent Labs   Lab 01/27/25  1429   NTBNPI 4,097*     No results for input(s): \"DD\" in the last 168 hours.  No results for input(s): \"SED\", \"CRP\" in the last 168 hours.  Recent Labs   Lab 01/28/25  0823 01/27/25  1429   * 142*     Recent " "Labs   Lab 01/27/25  2118   TSH 0.70     No results for input(s): \"COLOR\", \"APPEARANCE\", \"URINEGLC\", \"URINEBILI\", \"URINEKETONE\", \"SG\", \"UBLD\", \"URINEPH\", \"PROTEIN\", \"UROBILINOGEN\", \"NITRITE\", \"LEUKEST\", \"RBCU\", \"WBCU\" in the last 168 hours.    Imaging:  Recent Results (from the past 48 hours)   XR Chest 2 Views    Narrative    EXAM: XR CHEST 2 VIEWS  LOCATION: [facility]  DATE: 1/27/2025    INDICATION: Short of breath, chest discomfort. Cough and fever 2 days.  COMPARISON: 10/26/2023.      Impression    IMPRESSION: No acute cardiopulmonary disease. Stable borderline prominent cardiac silhouette.                    Echo:  No results found for this or any previous visit (from the past 4320 hours).    Clinically Significant Risk Factors Present on Admission                 # Thrombocytopenia: Lowest platelets = 123 in last 2 days, will monitor for bleeding   # Hypertension: Noted on problem list               # Asthma: noted on problem list       Cardiac Arrhythmia: Atrial fibrillation: Paroxysmal        Fluid overload, unspecified      "

## 2025-01-28 NOTE — PHARMACY-ADMISSION MEDICATION HISTORY
"Pharmacist Admission Medication History    Admission medication history is complete. The information provided in this note is only as accurate as the sources available at the time of the update.    Information Source(s): Patient, Family member, [facility], and pills in pillbox  via in-person    Pertinent Information:   -[place]  was declined. Daughter interpreted conversation and also helped with history.   -There was quite a bit of confusion about patient's medications. Daughter states she picks up meds but patient seems to manage them on her own. Daughter states she doesn't think patient is taking as prescribed but is likely \"pretty close\" and also wonders if patient runs out of meds sometimes or if some meds may be . Patient had a pillbox but it was not labeled with days in English. It seems there were only 6 days of meds in box despite box having more than 6 boxes.  One of the 6 boxes was missing some of what seem to be patient's regularly prescribed meds. Unclear if patient just took some of them or if they are not always set up properly.  -amlodipine: this was missing in 1 box of pillbox. Pt reports taking this daily  -bisoprolol: pt states taking this prescribed but some boxes in pillbox had 0 of these, others had 1, and others had 2 pills.   -Advair Diskus: pt/daughter did not mention the name Advair Diskus but pt reports taking a round inhaler and this is the only maintenance inhaler in [facility]. Last filled  for 30-day supply per [facility]. Patient reports taking this 2-3 x/day.  -patient states she took furosemide 40 mg x1 ~5 days ago. No recent fills in [facility].  -losartan: per [facility], only 100 mg tab filled in past year. Patient had some boxes in pillbox with 1 x 100 mg tab but others with 2 x 50 mg tabs. Unclear how old these 50 mg pills may be.  -spironolactone: no recent fills in [facility]. Pills found in a box in pillbox and identified using " "Micromedex. Patient reports she started taking this on her own ~1 week ago.  -\"unable to find\" \"Panangin\": this was a white pill in pillbox with no markings. Daughter showed me on her phone that this is \"panangin\" and it likely came from [place]. Unable to confirm what this product is. Patient states it is a \"vitamin for the heart.\" May contain potassium/magnesium but cannot confirm. Unclear if this is prescribed by someone or not.     Changes made to PTA medication list:  Added: ibuprofen, spironolactone (see note above, pt started taking on her own), \"unable to find\" \"Panangin\" (see note above)  Deleted: Augmentin, benzonatate, duplicate albuterol  Changed: omeprazole (daily --> daily PRN), losartan (added note pt has some 50 mg tabs she takes 2 of, see note above), Advair (1 puff q12h -->1 puff 2-3x/day, see note above), bisoprolol (added note about quantities in boxes, see note above), albuterol (2 puffs q6h PRN --> updated per patient (unclear how often pt uses)    Allergies reviewed with patient and updates made in EHR: no    Medication History Completed By: Iman Whitehead RPH 1/27/2025 7:52 PM    PTA Med List   Medication Sig Note Last Dose/Taking    albuterol (VENTOLIN HFA) 108 (90 Base) MCG/ACT inhaler INHALE 2 PUFFS INTO THE LUNGS EVERY 6 HOURS AS NEEDED FOR SHORTNESS OF BREATH OR WHEEZING (Patient taking differently: Inhale into the lungs. Initially reported as 2 puffs every 4 hours then later states 1 puff ~3x/day)  Taking Differently    amLODIPine (NORVASC) 5 MG tablet TAKE 1 TABLET(5 MG) BY MOUTH DAILY 1/27/2025: Reported as taking in evening but reports taking 1/27 AM 1/27/2025    bisoprolol (ZEBETA) 10 MG tablet TAKE 1 TABLET(10 MG) BY MOUTH DAILY 1/27/2025: Pt reports stating she takes this as prescribed but some boxes in pillbox had 0 of these, others 1, and others 2 tabs 1/26/2025    fluticasone-salmeterol (ADVAIR DISKUS) 500-50 MCG/ACT inhaler INHALE 1 PUFF INTO THE LUNGS EVERY 12 HOURS (Patient " "taking differently: Inhale 1 puff into the lungs. 2-3x/day) 1/27/2025: Last filled 11/16 per [facility] for 30 day supply 1/27/2025    ibuprofen (ADVIL/MOTRIN) 200 MG capsule Take 200 mg by mouth as needed for fever or other (pain).  Taking As Needed    ipratropium - albuterol 0.5 mg/2.5 mg/3 mL (DUONEB) 0.5-2.5 (3) MG/3ML neb solution TAKE 1 VIAL BY NEBULIZATION EVERY 6 HOURS AS NEEDED SHORTNESS OF BREATH, WHEEZING OR COUGH  Taking    losartan (COZAAR) 100 MG tablet TAKE 1 TABLET(100 MG) BY MOUTH DAILY 1/27/2025: Takes 100 mg x1 or 50 mg x2 (no recent fills in [facility] for 50 mg tabs) 1/27/2025    montelukast (SINGULAIR) 10 MG tablet TAKE 1 TABLET(10 MG) BY MOUTH AT BEDTIME  1/26/2025    omeprazole (PRILOSEC) 20 MG DR capsule Take 1 capsule (20 mg) by mouth daily (Patient taking differently: Take 20 mg by mouth daily as needed.)  Taking Differently    simvastatin (ZOCOR) 10 MG tablet TAKE 1 TABLET(10 MG) BY MOUTH AT BEDTIME  1/26/2025    spironolactone (ALDACTONE) 100 MG tablet Take 100 mg by mouth daily. Pt reports she started taking this own her own ~1 week ago. No recent fills in [facility]  1/26/2025    UNABLE TO FIND MEDICATION NAME: \"panangin\" - plain white pill, likely from [place]. Pt reports \"vitamin for heart.\" Unable to confirm content but may have potassium/magnesium. Unclear if once or twice per day  Taking       "

## 2025-01-28 NOTE — H&P
[facility]    History and Physical - Hospitalist Service       Date of Admission:  1/27/2025    Assessment & Plan      Arline Saini is a 74 year old female with history of CKD 3, hypertension, asthma admitted on 1/27/2025 with cough and fever for 3 days and was found to be in A-fib with RVR.    # Acute hypoxemic respiratory failure.  Desaturates to 84% on minimal ambulation.  Now on 3 L  # Cough and fever  # Influenza A  # h/o Asthma  -Was started on Tamiflu.  -Continue conservative management    #A-fib with RVR: a)Ensure stable electrolytes K > 4, magnesium> 2.  Will check TSH,  Likely related to infection.  Admission troponin of 13 but BNP 4097  B)  AF rate/rhythm management: Started on IV diltiazem gtt. in the ER.  Will continue and uptitrate as tolerated.  Add p.o. diltiazem 30 mg every 6 hours if BP tolerates.  - cardiology consult  C) Anticoagulation:   [id number] 2 score 3 (age, female sex, history of hypertension).  Will start her on IV heparin gtt. for now pending further cardiology review tomorrow    # Elevated BNP but clear lungs on my examination (apparently had wheezes and crackles on presentation and received Lasix)  -Will get echocardiogram  -Reevaluate in the a.m. for need for repeat Lasix    # CKD III   - monitor    Discussed with Pharm.D., significant concerns with PTA meds  Resumed her inhaler, Advair discus, Singulair, simvastatin.   For hypertension will start with Norvasc and further clarify losartan, spironolactone and bisoprolol        Diet: 2 Gram Sodium Diet    DVT Prophylaxis: heparin gtt   Mendez Catheter: Not present  Lines: None     Cardiac Monitoring: None  Code Status:  Full     Clinically Significant Risk Factors Present on Admission                   # Hypertension: Noted on problem list               # Asthma: noted on problem list        Disposition Plan     Medically Ready for Discharge: Anticipated in 2-4 Days       Caleb Hill MD  Hospitalist  "[facility]  Securely message with [employer] (more info)  Text page via [employer] Paging/Directory   \"This dictation was performed with voice recognition software and may contain errors,  omissions and inadvertent word substitution.\"    ______________________________________________________________________    Chief Complaint   Cough, SOB    History is obtained from the patient, daughter (refused  )    History of Present Illness   Arline Saini is a 74 year old female with history of CKD 3, hypertension, asthma admitted on 1/27/2025 with cough and fever for 3 days and was found to be in A-fib with RVR.  Noted to have cough and fever for the last 3 days.  Presented to urgent care and was found to have atrial fibrillation with RVR so transferred to ER.  Did had some awake chest pain 3 days ago which has been intermittent without any radiation.  Eases on laying flat and mild on sitting up.  Denies any relation to movement or ambulation.  Denies any specific aggravating or relieving factors and at the time of interview she denies any pain    In the ER she was started on Tamiflu.  Consult received 62.5 mg Solu-Medrol, Xopenex neb, 40 mg of Lasix and Cardizem 25 mg IV push followed by gtt.    Past Medical History    Past Medical History:   Diagnosis Date    Essential hypertension 2/27/2019    Obesity (BMI 35.0-39.9) with comorbidity (H) 2/27/2019       Past Surgical History   Past Surgical History:   Procedure Laterality Date    CHOLECYSTECTOMY  2018       Prior to Admission Medications   Prior to Admission Medications   Prescriptions Last Dose Informant Patient Reported? Taking?   albuterol (PROAIR HFA/PROVENTIL HFA/VENTOLIN HFA) 108 (90 Base) MCG/ACT inhaler   No No   Sig: Inhale 2 puffs into the lungs every 6 hours as needed for shortness of breath, wheezing or cough   albuterol (VENTOLIN HFA) 108 (90 Base) MCG/ACT inhaler   No No   Sig: INHALE 2 PUFFS INTO THE LUNGS EVERY 6 " HOURS AS NEEDED FOR SHORTNESS OF BREATH OR WHEEZING   amLODIPine (NORVASC) 5 MG tablet   No No   Sig: TAKE 1 TABLET(5 MG) BY MOUTH DAILY   amoxicillin-clavulanate (AUGMENTIN) 875-125 MG tablet   No No   Sig: Take 1 tablet by mouth 2 times daily.   Patient not taking: Reported on 1/27/2025   benzonatate (TESSALON) 100 MG capsule   No No   Sig: Take 1-2 capsules by mouth up to 3 x a day as needed with food   bisoprolol (ZEBETA) 10 MG tablet   No No   Sig: TAKE 1 TABLET(10 MG) BY MOUTH DAILY   fluticasone-salmeterol (ADVAIR DISKUS) 500-50 MCG/ACT inhaler   No No   Sig: INHALE 1 PUFF INTO THE LUNGS EVERY 12 HOURS   ipratropium - albuterol 0.5 mg/2.5 mg/3 mL (DUONEB) 0.5-2.5 (3) MG/3ML neb solution   No No   Sig: TAKE 1 VIAL BY NEBULIZATION EVERY 6 HOURS AS NEEDED SHORTNESS OF BREATH, WHEEZING OR COUGH   losartan (COZAAR) 100 MG tablet   No No   Sig: TAKE 1 TABLET(100 MG) BY MOUTH DAILY   montelukast (SINGULAIR) 10 MG tablet   No No   Sig: TAKE 1 TABLET(10 MG) BY MOUTH AT BEDTIME   omeprazole (PRILOSEC) 20 MG DR capsule   No No   Sig: Take 1 capsule (20 mg) by mouth daily   simvastatin (ZOCOR) 10 MG tablet   No No   Sig: TAKE 1 TABLET(10 MG) BY MOUTH AT BEDTIME      Facility-Administered Medications: None        Review of Systems    The 10 point Review of Systems is negative other than noted in the HPI or here.      Physical Exam   /87   Pulse (!) 122   Temp 100  F (37.8  C) (Oral)   Resp (!) 43   SpO2 97%   Gen- pleasant , smiling  Neck- supple  CVS- I+II+ no m/r/g, irregularly irregular, tachycardia  RS- CTAB, decreased at base but no gross crackles or wheeze  Abdo- soft, no tenderness .   Ext-trace edema       Medical Decision Making       76 MINUTES SPENT BY ME on the date of service doing chart review, history, exam, documentation & further activities per the note.      Data   ------------------------- PAST 24 HR DATA REVIEWED -----------------------------------------------    I have personally reviewed  the following data over the past 24 hrs:    7.1  \   12.4   / 142 (L)     137 104 16.4 /  112 (H)   4.6 21 (L) 0.94 \     Trop: 13 BNP: 4,097 (H)       Imaging results reviewed over the past 24 hrs:   Recent Results (from the past 24 hours)   XR Chest 2 Views    Narrative    EXAM: XR CHEST 2 VIEWS  LOCATION: [facility]  DATE: 1/27/2025    INDICATION: Short of breath, chest discomfort. Cough and fever 2 days.  COMPARISON: 10/26/2023.      Impression    IMPRESSION: No acute cardiopulmonary disease. Stable borderline prominent cardiac silhouette.

## 2025-01-28 NOTE — PLAN OF CARE
Care plan note:      Recent Vitals:  Temp: 98  F (36.7  C) Temp src: Oral BP: (!) 138/107 Pulse: 88   Resp: (!) 38 SpO2: 97 % O2 Device: Nasal cannula      Orientation/Neuro: Alert and Oriented x 4, Maltese speaking, daughter used as  via telephone  Pain: Pain is controlled without any medications.   Tele: Atrial fibrillation - controlled and Atrial fibrillation - rapid   IV medications: Heparin and Diltiazem   Mobility: St. by assist, bed alarm armed for saftey   Skin: Bruises: scattered and on hands/arms from blood draws and IV sites   GI: no complaints  : Urgency, using bedside commode.     Diet: Tolerating diet:   Well  Orders Placed This Encounter      Combination Diet Low Saturated Fat Na <2400mg Diet, No Caffeine Diet      Safety/Concerns:  Fall Risk  Aggression Color: Green    Plan: Control heart rate, cardiology consult, magnesium replaced recheck 8:30   Continue to monitor.      Nori Najera RN

## 2025-01-28 NOTE — PROGRESS NOTES
[facility]    Medicine Progress Note - Hospitalist Service    Date of Admission:  1/27/2025    Assessment & Plan   Arline Saini is a 74 year old female with history of CKD 3, hypertension, asthma admitted on 1/27/2025 with cough and fever for 3 days and was found to be in A-fib with RVR.     Acute hypoxemic respiratory failure  Influenza A  h/o Asthma  Several days of cough, fevers, body aches and dyspnea. Presented to the ED in respiratory distress from urgent care where she had a CXR performed that was negative for infiltrates. Requiring 3L of oxygen in the ED. Vital panel positive for Flu A    - weaned off oxygen today however wheezing noted with dyspnea, will start duonebs and consider steroid burst pending clinical trend  - continue home inhaler regime  - continue supportive care for the flu  - pending echo results consider more lasix given her elevated BNP however appears relatively dry on exam  - continue tamiflu     A-fib with RVR Likely related to infection.  Admission troponin of 13 but BNP 4097    - TSH normal  - TTE remains pending  - rate controlled on IV and oral diltiazem over the night however remains in afib. Cardiology switched to oral metoprolol, titrate up as needed  - cardiology recommends to continue rate control for now.  Consider cardioversion here vs outpatient pending clinical trend  - transitioned to xarelto today     CKD III   - stable, monitor          Diet: Fluid restriction 2000 ML FLUID  Combination Diet Low Saturated Fat Na <2400mg Diet, No Caffeine Diet    DVT Prophylaxis: DOAC  Mendez Catheter: Not present  Lines: None     Cardiac Monitoring: ACTIVE order. Indication: Tachyarrhythmias, acute (48 hours)  Code Status: Full Code      Clinically Significant Risk Factors Present on Admission                   # Hypertension: Noted on problem list               # Asthma: noted on problem list        Social Drivers of Health    Tobacco Use: Medium Risk  (1/27/2025)    Patient History     Smoking Tobacco Use: Former     Smokeless Tobacco Use: Never   Physical Activity: Inactive (6/5/2024)    Exercise Vital Sign     Days of Exercise per Week: 0 days     Minutes of Exercise per Session: 0 min   Social Connections: Unknown (6/5/2024)    Social Connection and Isolation Panel [NHANES]     Frequency of Social Gatherings with Friends and Family: Three times a week          Disposition Plan     Medically Ready for Discharge: Anticipated in 2-4 Days1-2 days pending clinical improvement and Afib plan             Nai Cha DO  Hospitalist Service  [facility]  Securely message with [employer] (more info)  Text page via [employer] Paging/Directory   ______________________________________________________________________    Interval History   Assumed care today. Patient doing better since admission. She said 30% better. She did not feel palpitation or her heart beating fast. Cough is bothering her still. No Gi complaints but no appetite.     Physical Exam   Vital Signs: Temp: 98  F (36.7  C) Temp src: Oral BP: 116/87 Pulse: 60   Resp: (!) 38 SpO2: 95 % O2 Device: None (Room air) Oxygen Delivery: 3 LPM  Weight: 231 lbs 0 oz    Constitutional: Awake, alert, cooperative, no apparent distress  Respiratory: trace expiratory wheezing   Cardiovascular: irregular, no obvious murmur  GI: Normal bowel sounds, soft, non-distended, non-tender  Skin/Integumen: No rashes, no cyanosis, no edema  Other:      Medical Decision Making       55 MINUTES SPENT BY ME on the date of service doing chart review, history, exam, documentation & further activities per the note.  Assumed care reviewed urgent care notes, personally reviewed outside CXR    Data

## 2025-01-28 NOTE — PROGRESS NOTES
01/28/25 1001   Appointment Info   Signing Clinician's Name / Credentials (PT) Marni London, PT, DPT   Living Environment   Living Environment Comments Pt not very forthcoming with home set up questions, reports she has a daughter and that she is not concerned about stairs   Self-Care   Usual Activity Tolerance good   Current Activity Tolerance fair   Regular Exercise No   Equipment Currently Used at Home cane, straight   Activity/Exercise/Self-Care Comment Pt denies assistive device use, reports she is independent at baseline   General Information   Onset of Illness/Injury or Date of Surgery 01/27/25   Referring Physician Caleb Hill MD   Patient/Family Therapy Goals Statement (PT) To get stronger   Pertinent History of Current Problem (include personal factors and/or comorbidities that impact the POC) Pt is 74 year old female adm on 1/27/25 from urgent care due to a-fib with RVR, pt also + for flu. PMH: CKD 3, HTN, asthma   Cognition   Affect/Mental Status (Cognition) WFL   Orientation Status (Cognition) oriented x 3   Follows Commands (Cognition) WFL   Cognitive Status Comments Pt is Ukrainian speaking, using phone for translation   Pain Assessment   Patient Currently in Pain No   Integumentary/Edema   Integumentary/Edema no deficits were identifed   Posture    Posture Forward head position   Range of Motion (ROM)   Range of Motion ROM is WFL   Strength (Manual Muscle Testing)   Strength (Manual Muscle Testing) strength is WFL   Bed Mobility   Comment, (Bed Mobility) SBA   Transfers   Comment, (Transfers) SBA   Gait/Stairs (Locomotion)   Comment, (Gait/Stairs) SBA   Balance   Balance Comments No overt LOB   Clinical Impression   Criteria for Skilled Therapeutic Intervention Yes, treatment indicated   PT Diagnosis (PT) Impaired mobility   Influenced by the following impairments Decreased activity tolerance   Functional limitations due to impairments Decreased ability to participate in daily tasks    Clinical Presentation (PT Evaluation Complexity) stable   Clinical Presentation Rationale Current presentation, [facility]   Clinical Decision Making (Complexity) low complexity   Planned Therapy Interventions (PT) balance training;bed mobility training;gait training;home exercise program;patient/family education;stair training;strengthening;transfer training;progressive activity/exercise   Risk & Benefits of therapy have been explained evaluation/treatment results reviewed;care plan/treatment goals reviewed;risks/benefits reviewed;current/potential barriers reviewed;participants voiced agreement with care plan;participants included;patient   PT Total Evaluation Time   PT Eval, Low Complexity Minutes (53218) 10   Physical Therapy Goals   PT Frequency 3x/week   PT Predicted Duration/Target Date for Goal Attainment 02/08/25   PT Goals Bed Mobility;Transfers;Gait;Stairs   PT: Bed Mobility Independent;Supine to/from sit;Rolling   PT: Transfers Independent;Sit to/from stand;Bed to/from chair   PT: Gait Modified independent;Assistive device;150 feet   PT: Stairs Supervision/stand-by assist;5 stairs   Interventions   Interventions Quick Adds Gait Training;Therapeutic Activity;Therapeutic Procedure   Therapeutic Activity   Therapeutic Activities: dynamic activities to improve functional performance Minutes (67992) 10   Symptoms Noted During/After Treatment Fatigue   Treatment Detail/Skilled Intervention Pt moves around bed independently, sits at EOB independently. Pt using phone to translate, states her daughter will be here soon and will help her perform ADLs. Pt states she is still far from discharge and is not worried about mobility for home therefore declining any further mobility at this time.   PT Discharge Planning   PT Plan Ambulation as able, progress activity as miladis   PT Discharge Recommendation (DC Rec) home with assist   PT Rationale for DC Rec Pt appears close to baseline level of function, anticipate return to home  when medically stable   PT Brief overview of current status Goals of therapy will be to address safe mobility and make recs for d/c to next level of care. Pt and RN will continue to follow all falls risk precautions as documented by RN staff while hospitalized   Physical Therapy Time and Intention   Timed Code Treatment Minutes 10   Total Session Time (sum of timed and untimed services) 20

## 2025-01-29 LAB
ANION GAP SERPL CALCULATED.3IONS-SCNC: 13 MMOL/L (ref 7–15)
BASOPHILS # BLD AUTO: 0 10E3/UL (ref 0–0.2)
BASOPHILS NFR BLD AUTO: 0 %
BUN SERPL-MCNC: 31.1 MG/DL (ref 8–23)
CALCIUM SERPL-MCNC: 8.7 MG/DL (ref 8.8–10.4)
CHLORIDE SERPL-SCNC: 103 MMOL/L (ref 98–107)
CREAT SERPL-MCNC: 1.12 MG/DL (ref 0.51–0.95)
EGFRCR SERPLBLD CKD-EPI 2021: 52 ML/MIN/1.73M2
EOSINOPHIL # BLD AUTO: 0 10E3/UL (ref 0–0.7)
EOSINOPHIL NFR BLD AUTO: 0 %
ERYTHROCYTE [DISTWIDTH] IN BLOOD BY AUTOMATED COUNT: 14.4 % (ref 10–15)
GLUCOSE SERPL-MCNC: 108 MG/DL (ref 70–99)
HCO3 SERPL-SCNC: 23 MMOL/L (ref 22–29)
HCT VFR BLD AUTO: 40.5 % (ref 35–47)
HGB BLD-MCNC: 13.5 G/DL (ref 11.7–15.7)
IMM GRANULOCYTES # BLD: 0 10E3/UL
IMM GRANULOCYTES NFR BLD: 1 %
LYMPHOCYTES # BLD AUTO: 1.7 10E3/UL (ref 0.8–5.3)
LYMPHOCYTES NFR BLD AUTO: 21 %
MAGNESIUM SERPL-MCNC: 2.2 MG/DL (ref 1.7–2.3)
MCH RBC QN AUTO: 29.9 PG (ref 26.5–33)
MCHC RBC AUTO-ENTMCNC: 33.3 G/DL (ref 31.5–36.5)
MCV RBC AUTO: 90 FL (ref 78–100)
MONOCYTES # BLD AUTO: 0.7 10E3/UL (ref 0–1.3)
MONOCYTES NFR BLD AUTO: 9 %
NEUTROPHILS # BLD AUTO: 5.5 10E3/UL (ref 1.6–8.3)
NEUTROPHILS NFR BLD AUTO: 69 %
NRBC # BLD AUTO: 0 10E3/UL
NRBC BLD AUTO-RTO: 0 /100
PLATELET # BLD AUTO: 166 10E3/UL (ref 150–450)
POTASSIUM SERPL-SCNC: 4.2 MMOL/L (ref 3.4–5.3)
RBC # BLD AUTO: 4.52 10E6/UL (ref 3.8–5.2)
SODIUM SERPL-SCNC: 139 MMOL/L (ref 135–145)
WBC # BLD AUTO: 7.9 10E3/UL (ref 4–11)

## 2025-01-29 PROCEDURE — 85025 COMPLETE CBC W/AUTO DIFF WBC: CPT | Performed by: STUDENT IN AN ORGANIZED HEALTH CARE EDUCATION/TRAINING PROGRAM

## 2025-01-29 PROCEDURE — 99233 SBSQ HOSP IP/OBS HIGH 50: CPT | Performed by: INTERNAL MEDICINE

## 2025-01-29 PROCEDURE — 250N000011 HC RX IP 250 OP 636: Performed by: NURSE PRACTITIONER

## 2025-01-29 PROCEDURE — 99233 SBSQ HOSP IP/OBS HIGH 50: CPT | Mod: FS | Performed by: NURSE PRACTITIONER

## 2025-01-29 PROCEDURE — 83735 ASSAY OF MAGNESIUM: CPT | Performed by: STUDENT IN AN ORGANIZED HEALTH CARE EDUCATION/TRAINING PROGRAM

## 2025-01-29 PROCEDURE — 250N000011 HC RX IP 250 OP 636: Performed by: STUDENT IN AN ORGANIZED HEALTH CARE EDUCATION/TRAINING PROGRAM

## 2025-01-29 PROCEDURE — 82374 ASSAY BLOOD CARBON DIOXIDE: CPT | Performed by: STUDENT IN AN ORGANIZED HEALTH CARE EDUCATION/TRAINING PROGRAM

## 2025-01-29 PROCEDURE — 250N000013 HC RX MED GY IP 250 OP 250 PS 637: Performed by: STUDENT IN AN ORGANIZED HEALTH CARE EDUCATION/TRAINING PROGRAM

## 2025-01-29 PROCEDURE — 210N000002 HC R&B HEART CARE

## 2025-01-29 PROCEDURE — 36415 COLL VENOUS BLD VENIPUNCTURE: CPT | Performed by: STUDENT IN AN ORGANIZED HEALTH CARE EDUCATION/TRAINING PROGRAM

## 2025-01-29 PROCEDURE — 250N000013 HC RX MED GY IP 250 OP 250 PS 637: Performed by: INTERNAL MEDICINE

## 2025-01-29 PROCEDURE — 80048 BASIC METABOLIC PNL TOTAL CA: CPT | Performed by: STUDENT IN AN ORGANIZED HEALTH CARE EDUCATION/TRAINING PROGRAM

## 2025-01-29 PROCEDURE — 250N000009 HC RX 250: Performed by: STUDENT IN AN ORGANIZED HEALTH CARE EDUCATION/TRAINING PROGRAM

## 2025-01-29 RX ORDER — METHYLPREDNISOLONE SODIUM SUCCINATE 125 MG/2ML
60 INJECTION INTRAMUSCULAR; INTRAVENOUS ONCE
Status: COMPLETED | OUTPATIENT
Start: 2025-01-29 | End: 2025-01-29

## 2025-01-29 RX ORDER — FUROSEMIDE 10 MG/ML
40 INJECTION INTRAMUSCULAR; INTRAVENOUS ONCE
Status: COMPLETED | OUTPATIENT
Start: 2025-01-29 | End: 2025-01-29

## 2025-01-29 RX ORDER — DILTIAZEM HYDROCHLORIDE 60 MG/1
60 CAPSULE, EXTENDED RELEASE ORAL 2 TIMES DAILY
Status: DISCONTINUED | OUTPATIENT
Start: 2025-01-29 | End: 2025-01-30

## 2025-01-29 RX ADMIN — METOPROLOL TARTRATE 50 MG: 50 TABLET, FILM COATED ORAL at 21:25

## 2025-01-29 RX ADMIN — IPRATROPIUM BROMIDE 0.5 MG: 0.5 SOLUTION RESPIRATORY (INHALATION) at 05:55

## 2025-01-29 RX ADMIN — OSELTAMIVIR PHOSPHATE 75 MG: 75 CAPSULE ORAL at 10:01

## 2025-01-29 RX ADMIN — PANTOPRAZOLE SODIUM 40 MG: 40 TABLET, DELAYED RELEASE ORAL at 10:01

## 2025-01-29 RX ADMIN — MONTELUKAST 10 MG: 10 TABLET, FILM COATED ORAL at 21:25

## 2025-01-29 RX ADMIN — METHYLPREDNISOLONE SODIUM SUCCINATE 62.5 MG: 125 INJECTION, POWDER, FOR SOLUTION INTRAMUSCULAR; INTRAVENOUS at 05:52

## 2025-01-29 RX ADMIN — FUROSEMIDE 40 MG: 10 INJECTION, SOLUTION INTRAMUSCULAR; INTRAVENOUS at 10:01

## 2025-01-29 RX ADMIN — DILTIAZEM HYDROCHLORIDE 60 MG: 60 CAPSULE, EXTENDED RELEASE ORAL at 21:25

## 2025-01-29 RX ADMIN — RIVAROXABAN 20 MG: 20 TABLET, FILM COATED ORAL at 10:01

## 2025-01-29 RX ADMIN — DILTIAZEM HYDROCHLORIDE 60 MG: 60 CAPSULE, EXTENDED RELEASE ORAL at 10:01

## 2025-01-29 RX ADMIN — METOPROLOL TARTRATE 50 MG: 50 TABLET, FILM COATED ORAL at 10:01

## 2025-01-29 RX ADMIN — OSELTAMIVIR PHOSPHATE 75 MG: 75 CAPSULE ORAL at 21:25

## 2025-01-29 RX ADMIN — SIMVASTATIN 10 MG: 10 TABLET, FILM COATED ORAL at 21:25

## 2025-01-29 RX ADMIN — FLUTICASONE FUROATE AND VILANTEROL TRIFENATATE 1 PUFF: 200; 25 POWDER RESPIRATORY (INHALATION) at 10:12

## 2025-01-29 ASSESSMENT — ACTIVITIES OF DAILY LIVING (ADL)
ADLS_ACUITY_SCORE: 38

## 2025-01-29 NOTE — PLAN OF CARE
Goal Outcome Evaluation:      Plan of Care Reviewed With: patient, child  Patient alert, steady with ambulation. Peruvian speaking, declines use of , using daughter and google translate on pt's phone. Pt remains in AF, started metoprolol with increased dose this afternoon. Rate was . Dilt drip was stopped by Dr Light. Pt had duoneb for wheezing, went back to -150. Updated Dr Light, order to restart Diltiazem drip at 5mg. Pt denies any symptoms. BP stable. Daughter updated on plan/condition.

## 2025-01-29 NOTE — PROGRESS NOTES
[facility]    Medicine Progress Note - Hospitalist Service    Date of Admission:  1/27/2025    Assessment & Plan   Arline Saini is a 74 year old female with history of CKD 3, hypertension, asthma admitted on 1/27/2025 with cough and fever for 3 days and was found to be in A-fib with RVR.      Acute hypoxemic respiratory failure  Influenza A  h/o Asthma  Several days of cough, fevers, body aches and dyspnea. Presented to the ED in respiratory distress from urgent care where she had a CXR performed that was negative for infiltrates. Requiring 3L of oxygen in the ED. Vital panel positive for Flu A     -Continue DuoNeb.  Had 1 dose of steroid this morning but likely related to fluid overload so continue on IV diuresis.  - continue home inhaler regime  - continue supportive care for the flu  - continue tamiflu     A-fib with RVR Likely related to infection.  Admission troponin of 13 but BNP 4097     - TSH normal  - TTE reviewed  -Has been on IV diltiazem and p.o. metoprolol was started 01/28.  Heart rate still 110-120 and p.o. diltiazem being added    - cardiology recommends to continue rate control for now.  Consider cardioversion here vs outpatient pending clinical trend  -Heparin gtt. transitioned to xarelto on 1/28      CKD III   - stable, monitor          Diet: Fluid restriction 2000 ML FLUID  Combination Diet Low Saturated Fat Na <2400mg Diet, No Caffeine Diet    DVT Prophylaxis: DOAC  Mendez Catheter: Not present  Lines: None     Cardiac Monitoring: ACTIVE order. Indication: Tachyarrhythmias, acute (48 hours)  Code Status: Full Code      Clinically Significant Risk Factors                 # Thrombocytopenia: Lowest platelets = 123 in last 2 days, will monitor for bleeding   # Hypertension: Noted on problem list                # Asthma: noted on problem list        Social Drivers of Health    Tobacco Use: Medium Risk (1/27/2025)    Patient History     Smoking Tobacco Use: Former      "Smokeless Tobacco Use: Never   Physical Activity: Inactive (6/5/2024)    Exercise Vital Sign     Days of Exercise per Week: 0 days     Minutes of Exercise per Session: 0 min   Social Connections: Unknown (6/5/2024)    Social Connection and Isolation Panel [NHANES]     Frequency of Social Gatherings with Friends and Family: Three times a week          Disposition Plan     Medically Ready for Discharge: Anticipated in 2-4 Days       Caleb Hill MD  Hospitalist Service  [facility]  Securely message with [employer] (more info)  Text page via [employer] Paging/Directory   \"This dictation was performed with voice recognition software and may contain errors,  omissions and inadvertent word substitution.\"  ______________________________________________________________________    Interval History   Last 24-hour events noted.  This morning had increased wheezing and was given nebulizer and 1 dose of Solu-Medrol.  Feels a bit better now    Physical Exam   BP (!) 152/110   Pulse 89   Temp 98.1  F (36.7  C) (Oral)   Resp 16   Wt 105.1 kg (231 lb 9.6 oz)   SpO2 99%   BMI 38.12 kg/m    Gen- pleasant   Neck- supple  CVS- I+II+ no m/r/g  RS- CTAB, decreased at base  Abdo- soft, no tenderness . No g/r/r  Ext-edema       Medical Decision Making       51 MINUTES SPENT BY ME on the date of service doing chart review, history, exam, documentation & further activities per the note.      Data   ------------------------- PAST 24 HR DATA REVIEWED -----------------------------------------------    I have personally reviewed the following data over the past 24 hrs:    7.9  \   13.5   / 166     139 103 31.1 (H) /  108 (H)   4.2 23 1.12 (H) \       Imaging results reviewed over the past 24 hrs:   Recent Results (from the past 24 hours)   Echocardiogram Complete   Result Value    LVEF  50-55%    Narrative    [id number]  [id number]  [id number]  [id number]^HARPAL[facility]  [facility] " Laboratory  [address] [place] [address]     Name: SARA MCKEON  MRN: [id number]  : 1950  Study Date: 2025 02:53 PM  Age: 74 yrs  Gender: Female  Patient Location: [facility]  Reason For Study: Atrial Fibrillation  Ordering Physician: GENOVEVA ROWAN  Referring Physician: Haley Valmy Elise Bullitt  Performed By: Cleo Pereira RDCS     BSA: 2.1 m2  Height: 65 in  Weight: 231 lb  HR: 103  BP: 115/88 mmHg  ______________________________________________________________________________  Procedure  Echocardiogram with two-dimensional, color and spectral Doppler. Definity (NDC  #19323-616) given intravenously.  ______________________________________________________________________________  Interpretation Summary     Left ventricular systolic function is normal.  The visual ejection fraction is 50-55%.  Normal right ventricular size and systolic function.  Moderate biatrial enlargement.  Mild mitral valve regurgitation.  Dilated inferior vena cava.  Stable ascending aorta dilatation of 4.0 cm.  Rhythm is atrial fibrillation.     ______________________________________________________________________________  Left Ventricle  The left ventricle is normal in size. There is normal left ventricular wall  thickness. Left ventricular systolic function is normal. The visual ejection  fraction is 50-55%. Diastolic function not assessed due to atrial  fibrillation. No regional wall motion abnormalities noted.     Right Ventricle  The right ventricle is normal in size and function.     Atria  There is moderate biatrial enlargement. There is no atrial shunt seen.     Mitral Valve  The mitral valve leaflets appear normal. There is no evidence of stenosis,  fluttering, or prolapse. There is mild (1+) mitral regurgitation. There is no  mitral valve stenosis.     Tricuspid Valve  Normal tricuspid valve. There is trace tricuspid regurgitation. The right  ventricular systolic pressure is approximated at 18.5  mmHg plus the right  atrial pressure.     Aortic Valve  The aortic valve is trileaflet with aortic valve sclerosis. The aortic valve  is not well visualized. There is trace aortic regurgitation. No aortic  stenosis is present.     Pulmonic Valve  There is trace pulmonic valvular regurgitation.     Vessels  The aortic root is normal size. Ascending aorta dilatation is present. 4.0 cm.  Dilation of the inferior vena cava is present with abnormal respiratory  variation in diameter.     Pericardium  There is no pericardial effusion.     Rhythm  The rhythm was atrial fibrillation.  ______________________________________________________________________________  MMode/2D Measurements & Calculations  IVSd: 1.1 cm     LVIDd: 5.3 cm  LVIDs: 3.7 cm  LVPWd: 1.0 cm  FS: 31.0 %  LV mass(C)d: 225.6 grams  LV mass(C)dI: 107.3 grams/m2  Ao root diam: 3.6 cm  asc Aorta Diam: 4.0 cm  LVOT diam: 2.3 cm  LVOT area: 4.3 cm2  Ao root diam index Ht(cm/m): 2.2  Ao root diam index BSA (cm/m2): 1.7  Asc Ao diam index BSA (cm/m2): 1.9  Asc Ao diam index Ht(cm/m): 2.4  LA Volume (BP): 95.0 ml     LA Volume Index (BP): 45.2 ml/m2  RWT: 0.39     Doppler Measurements & Calculations  MV E max jordy: 84.4 cm/sec  TR max jordy: 213.6 cm/sec  TR max P.5 mmHg     ______________________________________________________________________________  Report approved by: Dr Dann Enriquez on 2025 03:52 PM

## 2025-01-29 NOTE — PROGRESS NOTES
A&O x4 pleasant and cooperative, translation via patient's translation jeanna on phone. Afib CVR 90 to RVR 110s, BP within parameters. Coarse lower lung sounds, audible expiratory wheezes this AM. Iptratopium neb and solumedrol for possible asthma attack, given with relief. Up independent in room to bedside commode.

## 2025-01-29 NOTE — PROGRESS NOTES
[facility]    Cardiology Progress Note    Primary Cardiologist: Dr. Walker    Date of Admission: 1/27/2025  Service Date: 01/29/25    Summary:  Ms. Arline Saini is a very pleasant 74 year old female with a past medical history of obesity and hypertension who was admitted on 1/27/2025 for fever and cough x 3 days. Cardiology was consulted for atrial fibrillation with RVR.    Interval History   Patient had an episode of non-hypoxic respiratory distress with audible wheezing felt to be an asthma attack. She was given IV solumedrol and nebulizers. This morning she still feels short of breath. Denies chest pain. She uses her phone translation PRICILLA during our discussion.     Telemetry: Atrial fibrillation, rates 80-90s    Assessment & Plan   1.  Atrial fibrillation in the setting of influenza, [id number] 3  - Rate control improved following diuresis, continued recovery from influenza and diltiazem drip titration  -TTE revealed low normal LVEF 50-55% and moderate bi atrial enlargement with IVC dilation    2.  Influenza A infection    3.  Obesity, BMI 38    4. Essential hypertension  - PTA amlodipine 5mg daily, losartan 100mg daily, spironolactone 100mg daily,     5. Thrombocytopenia   - Platelet low on admission 142->now normalized     6. Ascending aortic dilation, stable, measuring 4.0cm    Plan:   1. Continue xarelto 20mg daily   2. Taper diltiazem drip and discontinue  3. Start oral diltiazem 60mg BID  4. Continue metoprolol tartrate 50mg BID  5. Continue simvastatin 10mg daily   6. Stop amlodipine in favor of addition of diltiazem for rate control   7. Give additional furosemide 40mg IV x 1 given evidence of IVC dilatation of echo and continued LE edema/SOB  8. Cardiology will continue to follow     Thank you for the opportunity to participate in this pleasant patient's care.     REGGIE Perez, CNP   Nurse Practitioner  [facility]  (8am - 5pm, M-F)    Patient  Active Problem List   Diagnosis    Essential hypertension    Obesity (BMI 35.0-39.9) with comorbidity (H)    Aortic root dilatation    External hemorrhoids    Chronic kidney disease, stage 3 (H)    Allergic bronchitis without complication    Moderate persistent asthma without complication    Posttraumatic stress disorder    Dyslipidemia    Cerebrovascular disease    Atrial fibrillation with rapid ventricular response (H)    Influenza A    New onset atrial fibrillation (H)    Atrial fibrillation with RVR (H)    Elevated brain natriuretic peptide (BNP) level       Physical Exam   Temp: 98.1  F (36.7  C) Temp src: Oral BP: (!) 142/107 Pulse: 74   Resp: 16 SpO2: 99 % O2 Device: None (Room air)    Vitals:    01/27/25 2054 01/28/25 0617 01/29/25 0640   Weight: 105.3 kg (232 lb 3.2 oz) 104.8 kg (231 lb) 105.1 kg (231 lb 9.6 oz)     Vital Signs with Ranges  Temp:  [97.7  F (36.5  C)-98.1  F (36.7  C)] 98.1  F (36.7  C)  Pulse:  [] 74  Resp:  [16-18] 16  BP: ()/() 142/107  SpO2:  [94 %-100 %] 99 %  I/O last 3 completed shifts:  In: 925 [P.O.:925]  Out: 300 [Urine:300]    Constitutional:  Appears her stated age, well nourished, and in no acute distress.  Eyes: Pupils equal, round. Sclerae anicteric.   HEENT: Normocephalic, atraumatic.   Neck: Supple. No JVD appreciated.  Respiratory: Breathing non-labored. Audible wheezing. Crackles throught all lobes  Cardiovascular: Irregularly irregular rate and rhythm, normal S1 and S2. No murmur, rub, or gallop.  GI: Soft, non-distended  Skin: Warm, dry. 1+ BLE edema into feet  Musculoskeletal/Extremities: Moves all extremities well and symmetrically.   Neurologic: No gross focal deficits. Alert, awake, and oriented to person, place and time.  Psychiatric: Affect appropriate. Mentation normal.    Medications   Current Facility-Administered Medications   Medication Dose Route Frequency Provider Last Rate Last Admin    diltiazem (CARDIZEM) 125 mg in sodium chloride 0.9 %  125 mL infusion  5-15 mg/hr Intravenous Continuous Pedro Light MD 5 mL/hr at 25 1736 5 mg/hr at 25 1736    No anticoagulants IF patient has had acute trauma/surgery or recent intracranial, GI or urinary tract bleeding.    Other DOES NOT GO TO Caleb Lazar MD         Current Facility-Administered Medications   Medication Dose Route Frequency Provider Last Rate Last Admin    diltiazem ER (CARDIZEM SR) 12 hr capsule 60 mg  60 mg Oral BID Pedro Light MD        fluticasone-vilanterol (BREO ELLIPTA) 200-25 MCG/ACT inhaler 1 puff  1 puff Inhalation Daily Caleb Hill MD   1 puff at 25 104    metoprolol tartrate (LOPRESSOR) tablet 50 mg  50 mg Oral BID Pedro Light MD   50 mg at 25    montelukast (SINGULAIR) tablet 10 mg  10 mg Oral At Bedtime Caleb Hill MD   10 mg at 25    oseltamivir (TAMIFLU) capsule 75 mg  75 mg Oral BID Caleb Hill MD   75 mg at 25    pantoprazole (PROTONIX) EC tablet 40 mg  40 mg Oral Daily Nai Cha DO   40 mg at 25 1700    rivaroxaban ANTICOAGULANT (XARELTO) tablet 20 mg  20 mg Oral Daily with breakfast Pedro Light MD   20 mg at 25 104    simvastatin (ZOCOR) tablet 10 mg  10 mg Oral At Bedtime Caleb Hill MD   10 mg at 25    sodium chloride (PF) 0.9% PF flush 3 mL  3 mL Intracatheter Q8H Caleb Hill MD           Data   Recent Results (from the past 24 hours)   Echocardiogram Complete   Result Value    LVEF  50-55%    Narrative    [id number]  [id number]  [id number]^CALEB     [facility]  Echocardiography Laboratory  [address] [place] [id number]     Name: SARA MCKEON  MRN: [id number]  : 1950  Study Date: 2025 02:53 PM  Age: 74 yrs  Gender: Female  Patient Location: [facility] [address]  Reason For Study: Atrial Fibrillation  Ordering Physician: CALEB HILL  Referring Physician: [facility] [place] [facility] [place]  Performed By:  Cleo Pereira, [facility]     BSA: 2.1 m2  Height: 65 in  Weight: 231 lb  HR: 103  BP: 115/88 mmHg  ______________________________________________________________________________  Procedure  Echocardiogram with two-dimensional, color and spectral Doppler. Definity (NDC  #11535-974) given intravenously.  ______________________________________________________________________________  Interpretation Summary     Left ventricular systolic function is normal.  The visual ejection fraction is 50-55%.  Normal right ventricular size and systolic function.  Moderate biatrial enlargement.  Mild mitral valve regurgitation.  Dilated inferior vena cava.  Stable ascending aorta dilatation of 4.0 cm.  Rhythm is atrial fibrillation.     ______________________________________________________________________________  Left Ventricle  The left ventricle is normal in size. There is normal left ventricular wall  thickness. Left ventricular systolic function is normal. The visual ejection  fraction is 50-55%. Diastolic function not assessed due to atrial  fibrillation. No regional wall motion abnormalities noted.     Right Ventricle  The right ventricle is normal in size and function.     Atria  There is moderate biatrial enlargement. There is no atrial shunt seen.     Mitral Valve  The mitral valve leaflets appear normal. There is no evidence of stenosis,  fluttering, or prolapse. There is mild (1+) mitral regurgitation. There is no  mitral valve stenosis.     Tricuspid Valve  Normal tricuspid valve. There is trace tricuspid regurgitation. The right  ventricular systolic pressure is approximated at 18.5 mmHg plus the right  atrial pressure.     Aortic Valve  The aortic valve is trileaflet with aortic valve sclerosis. The aortic valve  is not well visualized. There is trace aortic regurgitation. No aortic  stenosis is present.     Pulmonic Valve  There is trace pulmonic valvular regurgitation.     Vessels  The aortic root is normal size.  Ascending aorta dilatation is present. 4.0 cm.  Dilation of the inferior vena cava is present with abnormal respiratory  variation in diameter.     Pericardium  There is no pericardial effusion.     Rhythm  The rhythm was atrial fibrillation.  ______________________________________________________________________________  MMode/2D Measurements & Calculations  IVSd: 1.1 cm     LVIDd: 5.3 cm  LVIDs: 3.7 cm  LVPWd: 1.0 cm  FS: 31.0 %  LV mass(C)d: 225.6 grams  LV mass(C)dI: 107.3 grams/m2  Ao root diam: 3.6 cm  asc Aorta Diam: 4.0 cm  LVOT diam: 2.3 cm  LVOT area: 4.3 cm2  Ao root diam index Ht(cm/m): 2.2  Ao root diam index BSA (cm/m2): 1.7  Asc Ao diam index BSA (cm/m2): 1.9  Asc Ao diam index Ht(cm/m): 2.4  LA Volume (BP): 95.0 ml     LA Volume Index (BP): 45.2 ml/m2  RWT: 0.39     Doppler Measurements & Calculations  MV E max jordy: 84.4 cm/sec  TR max jordy: 213.6 cm/sec  TR max P.5 mmHg     ______________________________________________________________________________  Report approved by: Dr Dann Enriquez on 2025 03:52 PM             Recent Labs   Lab 25  0625  1801 25  0823   WBC 7.9 6.9 4.8   HGB 13.5 13.0 11.7   HCT 40.5 38.7 35.4   MCV 90 89 89    167 123*     Recent Labs   Lab 25  0624 25  0823 25  1429    139 137   POTASSIUM 4.2 4.1 4.6   CHLORIDE 103 104 104   CO2 23 23 21*   ANIONGAP 13 12 12   * 132* 112*   BUN 31.1* 25.2* 16.4   CR 1.12* 0.96* 0.94   GFRESTIMATED 52* 62 64   YOANDY 8.7* 8.7* 8.8        This note was completed in part using Dragon voice recognition software. Although reviewed after completion, some word and grammatical errors may occur.

## 2025-01-29 NOTE — SIGNIFICANT EVENT
Significant Event Note    Time of event: 5:47 AM January 29, 2025    Description of event:  Respiratory distress, nonhypoxic, wheezing audible  Likely asthma attack    Plan:  Iptratopium nebs without albuterol as it causes RVR in AM  Solumedrol 60mg    Jj Allred MD

## 2025-01-29 NOTE — PROGRESS NOTES
"MD Notification    Notified Person: MD Brigida  Notification Date/Time: 0528 1-29-25  Notification Interaction: Jose Antonio  Purpose of Notification:     Pt here with Afib RVR on dilt gtt. Pt was given duoneb today and went back into RVR and that medication was discontinued. Pt does have order for levalbuterol: is it okay for her to receive that given her HR? Currently Afib CVR 90s on 5 Dilt. Pt audibly wheezey, coughing, states via Google Translate \"I'm suffocating\" but vitals stable 99% SpO2. Iso for Flu. OK to give her levalbuterol or what other neb/inhaler would be safer? Berto [phone].    I spoke with the pharmacist, he recommends maybe a half-dose of Xopenex? Current dose is the 1.25 mg. What are your thoughts?    Orders Received: Orders paced  Comments:    "

## 2025-01-30 VITALS
DIASTOLIC BLOOD PRESSURE: 93 MMHG | OXYGEN SATURATION: 96 % | RESPIRATION RATE: 18 BRPM | SYSTOLIC BLOOD PRESSURE: 126 MMHG | TEMPERATURE: 98 F | HEART RATE: 113 BPM | BODY MASS INDEX: 37.7 KG/M2 | WEIGHT: 229 LBS

## 2025-01-30 LAB
ANION GAP SERPL CALCULATED.3IONS-SCNC: 13 MMOL/L (ref 7–15)
BUN SERPL-MCNC: 34 MG/DL (ref 8–23)
CALCIUM SERPL-MCNC: 9.2 MG/DL (ref 8.8–10.4)
CHLORIDE SERPL-SCNC: 102 MMOL/L (ref 98–107)
CREAT SERPL-MCNC: 1.07 MG/DL (ref 0.51–0.95)
EGFRCR SERPLBLD CKD-EPI 2021: 55 ML/MIN/1.73M2
GLUCOSE SERPL-MCNC: 141 MG/DL (ref 70–99)
HCO3 SERPL-SCNC: 23 MMOL/L (ref 22–29)
MAGNESIUM SERPL-MCNC: 2.1 MG/DL (ref 1.7–2.3)
POTASSIUM SERPL-SCNC: 4.1 MMOL/L (ref 3.4–5.3)
SODIUM SERPL-SCNC: 138 MMOL/L (ref 135–145)

## 2025-01-30 PROCEDURE — 99233 SBSQ HOSP IP/OBS HIGH 50: CPT | Mod: FS | Performed by: NURSE PRACTITIONER

## 2025-01-30 PROCEDURE — 250N000013 HC RX MED GY IP 250 OP 250 PS 637: Performed by: INTERNAL MEDICINE

## 2025-01-30 PROCEDURE — 250N000011 HC RX IP 250 OP 636: Performed by: NURSE PRACTITIONER

## 2025-01-30 PROCEDURE — 99233 SBSQ HOSP IP/OBS HIGH 50: CPT | Performed by: INTERNAL MEDICINE

## 2025-01-30 PROCEDURE — 250N000013 HC RX MED GY IP 250 OP 250 PS 637: Performed by: NURSE PRACTITIONER

## 2025-01-30 PROCEDURE — 36415 COLL VENOUS BLD VENIPUNCTURE: CPT | Performed by: INTERNAL MEDICINE

## 2025-01-30 PROCEDURE — 210N000002 HC R&B HEART CARE

## 2025-01-30 PROCEDURE — 250N000013 HC RX MED GY IP 250 OP 250 PS 637: Performed by: STUDENT IN AN ORGANIZED HEALTH CARE EDUCATION/TRAINING PROGRAM

## 2025-01-30 PROCEDURE — 83735 ASSAY OF MAGNESIUM: CPT | Performed by: INTERNAL MEDICINE

## 2025-01-30 PROCEDURE — 80048 BASIC METABOLIC PNL TOTAL CA: CPT | Performed by: INTERNAL MEDICINE

## 2025-01-30 RX ORDER — DILTIAZEM HYDROCHLORIDE 90 MG/1
90 CAPSULE, EXTENDED RELEASE ORAL 2 TIMES DAILY
Status: DISPENSED | OUTPATIENT
Start: 2025-01-30

## 2025-01-30 RX ORDER — FUROSEMIDE 10 MG/ML
20 INJECTION INTRAMUSCULAR; INTRAVENOUS ONCE
Status: COMPLETED | OUTPATIENT
Start: 2025-01-30 | End: 2025-01-30

## 2025-01-30 RX ADMIN — FLUTICASONE FUROATE AND VILANTEROL TRIFENATATE 1 PUFF: 200; 25 POWDER RESPIRATORY (INHALATION) at 09:48

## 2025-01-30 RX ADMIN — PANTOPRAZOLE SODIUM 40 MG: 40 TABLET, DELAYED RELEASE ORAL at 09:48

## 2025-01-30 RX ADMIN — OSELTAMIVIR PHOSPHATE 75 MG: 75 CAPSULE ORAL at 09:48

## 2025-01-30 RX ADMIN — METOPROLOL TARTRATE 75 MG: 50 TABLET, FILM COATED ORAL at 09:48

## 2025-01-30 RX ADMIN — MONTELUKAST 10 MG: 10 TABLET, FILM COATED ORAL at 21:58

## 2025-01-30 RX ADMIN — OSELTAMIVIR PHOSPHATE 75 MG: 75 CAPSULE ORAL at 21:58

## 2025-01-30 RX ADMIN — DILTIAZEM HYDROCHLORIDE 90 MG: 90 CAPSULE, EXTENDED RELEASE ORAL at 21:58

## 2025-01-30 RX ADMIN — DILTIAZEM HYDROCHLORIDE 60 MG: 60 CAPSULE, EXTENDED RELEASE ORAL at 09:48

## 2025-01-30 RX ADMIN — RIVAROXABAN 20 MG: 20 TABLET, FILM COATED ORAL at 09:48

## 2025-01-30 RX ADMIN — METOPROLOL TARTRATE 75 MG: 50 TABLET, FILM COATED ORAL at 21:58

## 2025-01-30 RX ADMIN — SIMVASTATIN 10 MG: 10 TABLET, FILM COATED ORAL at 21:58

## 2025-01-30 RX ADMIN — FUROSEMIDE 20 MG: 10 INJECTION, SOLUTION INTRAMUSCULAR; INTRAVENOUS at 14:17

## 2025-01-30 ASSESSMENT — ACTIVITIES OF DAILY LIVING (ADL)
ADLS_ACUITY_SCORE: 38

## 2025-01-30 NOTE — PROGRESS NOTES
[facility]    Medicine Progress Note - Hospitalist Service    Date of Admission:  1/27/2025    Assessment & Plan   Arline Saini is a 74 year old female with history of CKD 3, hypertension, asthma admitted on 1/27/2025 with cough and fever for 3 days and was found to be in A-fib with RVR.      Acute hypoxemic respiratory failure  Influenza A  h/o Asthma  Several days of cough, fevers, body aches and dyspnea. Presented to the ED in respiratory distress from urgent care where she had a CXR performed that was negative for infiltrates. Requiring 3L of oxygen in the ED. Vital panel positive for Flu A     -Continue DuoNeb.  Had 1 dose of steroid on admission but likely related to fluid overload so continue on IV diuresis.  Additional 20 mg IV Lasix on 01/30 due to leg edema  - continue home inhaler regime  - continue supportive care for the flu  - continue tamiflu     A-fib with RVR Likely related to infection.  Admission troponin of 13 but BNP 4097     - TSH normal  - TTE reviewed  -Has been on IV diltiazem and p.o. metoprolol 75 mg twice daily was started 01/28.  Heart rate still 110-120 and p.o. diltiazem added 01/29  Increased diltiazem 01/3 to 90 mg twice daily  -Heparin gtt. transitioned to xarelto on 1/28      CKD III   - stable, monitor          Diet: Fluid restriction 2000 ML FLUID  Combination Diet Low Saturated Fat Na <2400mg Diet, No Caffeine Diet    DVT Prophylaxis: DOAC  Mendez Catheter: Not present  Lines: None     Cardiac Monitoring: ACTIVE order. Indication: Tachyarrhythmias, acute (48 hours)  Code Status: Full Code      Clinically Significant Risk Factors                   # Hypertension: Noted on problem list                # Asthma: noted on problem list        Social Drivers of Health    Tobacco Use: Medium Risk (1/27/2025)    Patient History     Smoking Tobacco Use: Former     Smokeless Tobacco Use: Never   Physical Activity: Inactive (6/5/2024)    Exercise Vital Sign      "Days of Exercise per Week: 0 days     Minutes of Exercise per Session: 0 min   Social Connections: Unknown (6/5/2024)    Social Connection and Isolation Panel [NHANES]     Frequency of Social Gatherings with Friends and Family: Three times a week          Disposition Plan     Medically Ready for Discharge: Anticipated in 2-4 Days       Caleb Hill MD  Hospitalist Service  [facility]  Securely message with [employer] (more info)  Text page via [employer] Paging/Directory   \"This dictation was performed with voice recognition software and may contain errors,  omissions and inadvertent word substitution.\"  ______________________________________________________________________    Interval History   Last 24-hour events noted.   Breathing is better but continues to have high heart rate  Denies any fever/chills  Physical Exam   /70 (BP Location: Right arm)   Pulse 70   Temp 98.4  F (36.9  C) (Oral)   Resp 16   Wt 103.9 kg (229 lb)   SpO2 97%   BMI 37.70 kg/m    Gen- pleasant   Neck- supple  CVS- I+II+ no m/r/g  RS- CTAB, decreased at base  Abdo- soft, no tenderness . No g/r/r  Ext-edema       Medical Decision Making       51 MINUTES SPENT BY ME on the date of service doing chart review, history, exam, documentation & further activities per the note.  Discussion patient, her daughter, bedside RN    Data   ------------------------- PAST 24 HR DATA REVIEWED -----------------------------------------------    I have personally reviewed the following data over the past 24 hrs:    N/A  \   N/A   / N/A     138 102 34.0 (H) /  141 (H)   4.1 23 1.07 (H) \       Imaging results reviewed over the past 24 hrs:   No results found for this or any previous visit (from the past 24 hours).    "

## 2025-01-30 NOTE — PLAN OF CARE
[facility] Shift Note    Orientation: Alert & Oriented x 4  Vitals: VSS, Hypertensive at times.  Tele: A. Nargis RVR   Lines/Drains: SL  Skin/Wounds: WDL  GI/: WDL  Ambulation/Assist: Independently  Plan: Rate control Possible cardioversion.  JANETTE: Pt is Citizen of the [place] speaking, uses translation jeanna on her phone to communicate.Pt has been urinating over the night but has been dumping her own urine, Pt was asked to safe urine for nursing staff.     Thomas Sandoval RN

## 2025-01-30 NOTE — PROGRESS NOTES
[id number]:    A&O x4 pleasant and cooperative, translation via patient's translation jeanna on phone. Afib CVR 90 to RVR 110s, PO diltiazem and metoprolol. Clear LS, declines need for nebulizer treatments at this time. Up independent in room to bedside commode

## 2025-01-30 NOTE — PLAN OF CARE
A&O x4,  jeanna on phone. Pt denied pain. VSS, on RA. Up IND. Tele: A fib CVR/RVR 's.  Breathing improved per pt. Dilt gtt transitioned to PO. 1x dose of IV lasix this am. Showered this evening. Continue to monitor.

## 2025-01-30 NOTE — PROGRESS NOTES
[facility]    Cardiology Progress Note    Primary Cardiologist: Dr. Walker    Date of Admission: 1/27/2025  Service Date: 01/30/25    Summary:  Ms. Arline Saini is a very pleasant 74 year old female with a past medical history of obesity and hypertension who was admitted on 1/27/2025 for fever and cough x 3 days. Cardiology was consulted for atrial fibrillation with RVR.    Interval History   Weight down 2 lbs overnight. 800ml output, however nursing notes patient has been dumping her urine, so suspect inaccuracy. Reportedly net negative 500ml yesterday. Still with LE edema and shortness of breath. Denies chest pain. Uses her translation jeanna on her phone for our visit today.     Telemetry: Atrial fibrillation, rates 90-100s    Assessment & Plan   1.  Atrial fibrillation in the setting of influenza, [id number] 3  - Rate control improved following diuresis, up titration of metoprolol and diltizem, suspect rates will continue to improve with ongoing recovery from influenza and diltiazem/metoprolol titration   -TTE revealed low normal LVEF 50-55% and moderate bi atrial enlargement with IVC dilation-> furosemide given x2    2.  Influenza A infection, symptomatically improving     3.  Obesity, BMI 38    4. Essential hypertension  - PTA amlodipine 5mg daily, losartan 100mg daily, spironolactone 100mg daily,     5. Thrombocytopenia   - Platelet low on admission 142->now normalized     6. Ascending aortic dilation, stable, measuring 4.0cm    Plan:   1. Continue xarelto 20mg daily   2. Increase oral diltiazem to 90mg BID  3. Continue metoprolol tartrate 75mg BID  4. Continue simvastatin 10mg daily   5. Pal for outpatient cardioversion in 3-4 weeks following recovery from influenza and uninterrupted anticoagulation '  6. IV furosemide 20mg x1 today  7. Added daily ace wraps   8. Cardiology will continue to follow     Thank you for the opportunity to participate in this pleasant patient's care.      REGGIE Perez, CNP   Nurse Practitioner  [facility]  (8am - 5pm, M-F)    Patient Active Problem List   Diagnosis    Essential hypertension    Obesity (BMI 35.0-39.9) with comorbidity (H)    Aortic root dilatation    External hemorrhoids    Chronic kidney disease, stage 3 (H)    Allergic bronchitis without complication    Moderate persistent asthma without complication    Posttraumatic stress disorder    Dyslipidemia    Cerebrovascular disease    Atrial fibrillation with rapid ventricular response (H)    Influenza A    New onset atrial fibrillation (H)    Atrial fibrillation with RVR (H)    Elevated brain natriuretic peptide (BNP) level       Physical Exam   Temp: 98.4  F (36.9  C) Temp src: Oral BP: 130/70 Pulse: 70   Resp: 16 SpO2: 97 % O2 Device: None (Room air)    Vitals:    01/28/25 0617 01/29/25 0640 01/30/25 0609   Weight: 104.8 kg (231 lb) 105.1 kg (231 lb 9.6 oz) 103.9 kg (229 lb)     Vital Signs with Ranges  Temp:  [97.2  F (36.2  C)-98.7  F (37.1  C)] 98.4  F (36.9  C)  Pulse:  [] 70  Resp:  [16-18] 16  BP: (121-152)/() 130/70  SpO2:  [95 %-98 %] 97 %  I/O last 3 completed shifts:  In: 1050 [P.O.:1050]  Out: 500 [Urine:500]    Constitutional:  Appears her stated age, well nourished, and in no acute distress.  Eyes: Pupils equal, round. Sclerae anicteric.   HEENT: Normocephalic, atraumatic.   Neck: Supple. No JVD appreciated.  Respiratory: Breathing non-labored. Audible wheezing. Crackles throught all lobes  Cardiovascular: Irregularly irregular rate and rhythm, normal S1 and S2. No murmur, rub, or gallop.  GI: Soft, non-distended  Skin: Warm, dry. 1+ BLE edema into feet  Musculoskeletal/Extremities: Moves all extremities well and symmetrically.   Neurologic: No gross focal deficits. Alert, awake, and oriented to person, place and time.  Psychiatric: Affect appropriate. Mentation normal.    Medications   Current Facility-Administered Medications   Medication Dose  Route Frequency Provider Last Rate Last Admin    diltiazem (CARDIZEM) 125 mg in sodium chloride 0.9 % 125 mL infusion  5-15 mg/hr Intravenous Continuous Pedro Light MD   Stopped at 01/29/25 1232    No anticoagulants IF patient has had acute trauma/surgery or recent intracranial, GI or urinary tract bleeding.    Other DOES NOT GO TO MAR Caleb Hill MD         Current Facility-Administered Medications   Medication Dose Route Frequency Provider Last Rate Last Admin    diltiazem ER (CARDIZEM SR) 12 hr capsule 60 mg  60 mg Oral BID Pedro Light MD   60 mg at 01/29/25 2125    fluticasone-vilanterol (BREO ELLIPTA) 200-25 MCG/ACT inhaler 1 puff  1 puff Inhalation Daily Caleb Hill MD   1 puff at 01/29/25 1012    metoprolol tartrate (LOPRESSOR) tablet 75 mg  75 mg Oral BID Pedro Light MD        montelukast (SINGULAIR) tablet 10 mg  10 mg Oral At Bedtime Caleb Hill MD   10 mg at 01/29/25 2125    oseltamivir (TAMIFLU) capsule 75 mg  75 mg Oral BID Caleb Hill MD   75 mg at 01/29/25 2125    pantoprazole (PROTONIX) EC tablet 40 mg  40 mg Oral Daily Nai Cha DO   40 mg at 01/29/25 1001    rivaroxaban ANTICOAGULANT (XARELTO) tablet 20 mg  20 mg Oral Daily with breakfast Pedro Light MD   20 mg at 01/29/25 1001    simvastatin (ZOCOR) tablet 10 mg  10 mg Oral At Bedtime Caleb Hill MD   10 mg at 01/29/25 2125    sodium chloride (PF) 0.9% PF flush 3 mL  3 mL Intracatheter Q8H Caleb Hill MD           Data   No results found for this or any previous visit (from the past 24 hours).      Recent Labs   Lab 01/29/25  0624 01/28/25  1801 01/28/25  0823   WBC 7.9 6.9 4.8   HGB 13.5 13.0 11.7   HCT 40.5 38.7 35.4   MCV 90 89 89    167 123*     Recent Labs   Lab 01/30/25  0718 01/29/25  0624 01/28/25  0823    139 139   POTASSIUM 4.1 4.2 4.1   CHLORIDE 102 103 104   CO2 23 23 23   ANIONGAP 13 13 12   * 108* 132*   BUN 34.0* 31.1* 25.2*   CR 1.07* 1.12* 0.96*   GFRESTIMATED  55* 52* 62   [facility] 9.2 8.7* 8.7*        This note was completed in part using Dragon voice recognition software. Although reviewed after completion, some word and grammatical errors may occur.

## 2025-01-30 NOTE — PLAN OF CARE
A&O x4,  jeanna on phone. Pt denied pain. VSS, on RA. Up IND. Tele: A fib CVR/RVR 's, po dilt increased. Breathing improved per pt- still has cough. 1x dose of IV lasix and ace wraps placed to help with BLLE edema. Plan for opt cardioversion in 3 weeks once recovered from influenza and on PO anticoagulation for 3 weeks. Continue to monitor

## 2025-01-31 ENCOUNTER — APPOINTMENT (OUTPATIENT)
Dept: GENERAL RADIOLOGY | Facility: CLINIC | Age: 75
End: 2025-01-31
Attending: INTERNAL MEDICINE
Payer: COMMERCIAL

## 2025-01-31 LAB
ANION GAP SERPL CALCULATED.3IONS-SCNC: 12 MMOL/L (ref 7–15)
BUN SERPL-MCNC: 33.1 MG/DL (ref 8–23)
CALCIUM SERPL-MCNC: 8.8 MG/DL (ref 8.8–10.4)
CHLORIDE SERPL-SCNC: 103 MMOL/L (ref 98–107)
CREAT SERPL-MCNC: 0.97 MG/DL (ref 0.51–0.95)
EGFRCR SERPLBLD CKD-EPI 2021: 61 ML/MIN/1.73M2
GLUCOSE SERPL-MCNC: 102 MG/DL (ref 70–99)
HCO3 SERPL-SCNC: 23 MMOL/L (ref 22–29)
MAGNESIUM SERPL-MCNC: 1.8 MG/DL (ref 1.7–2.3)
NT-PROBNP SERPL-MCNC: 1000 PG/ML (ref 0–900)
POTASSIUM SERPL-SCNC: 3.6 MMOL/L (ref 3.4–5.3)
SODIUM SERPL-SCNC: 138 MMOL/L (ref 135–145)

## 2025-01-31 PROCEDURE — 250N000013 HC RX MED GY IP 250 OP 250 PS 637: Performed by: INTERNAL MEDICINE

## 2025-01-31 PROCEDURE — 36415 COLL VENOUS BLD VENIPUNCTURE: CPT | Performed by: INTERNAL MEDICINE

## 2025-01-31 PROCEDURE — 83735 ASSAY OF MAGNESIUM: CPT | Performed by: INTERNAL MEDICINE

## 2025-01-31 PROCEDURE — 99232 SBSQ HOSP IP/OBS MODERATE 35: CPT | Performed by: INTERNAL MEDICINE

## 2025-01-31 PROCEDURE — 250N000011 HC RX IP 250 OP 636: Performed by: NURSE PRACTITIONER

## 2025-01-31 PROCEDURE — 250N000013 HC RX MED GY IP 250 OP 250 PS 637: Performed by: STUDENT IN AN ORGANIZED HEALTH CARE EDUCATION/TRAINING PROGRAM

## 2025-01-31 PROCEDURE — 210N000002 HC R&B HEART CARE

## 2025-01-31 PROCEDURE — 71046 X-RAY EXAM CHEST 2 VIEWS: CPT

## 2025-01-31 PROCEDURE — 250N000013 HC RX MED GY IP 250 OP 250 PS 637: Performed by: NURSE PRACTITIONER

## 2025-01-31 PROCEDURE — 80048 BASIC METABOLIC PNL TOTAL CA: CPT | Performed by: INTERNAL MEDICINE

## 2025-01-31 PROCEDURE — 99233 SBSQ HOSP IP/OBS HIGH 50: CPT | Mod: FS | Performed by: NURSE PRACTITIONER

## 2025-01-31 PROCEDURE — 83880 ASSAY OF NATRIURETIC PEPTIDE: CPT | Performed by: INTERNAL MEDICINE

## 2025-01-31 RX ORDER — METOPROLOL TARTRATE 100 MG/1
100 TABLET ORAL 2 TIMES DAILY
Status: DISCONTINUED | OUTPATIENT
Start: 2025-01-31 | End: 2025-02-04 | Stop reason: HOSPADM

## 2025-01-31 RX ORDER — FUROSEMIDE 10 MG/ML
20 INJECTION INTRAMUSCULAR; INTRAVENOUS ONCE
Status: COMPLETED | OUTPATIENT
Start: 2025-01-31 | End: 2025-01-31

## 2025-01-31 RX ORDER — FUROSEMIDE 10 MG/ML
40 INJECTION INTRAMUSCULAR; INTRAVENOUS DAILY
Status: DISCONTINUED | OUTPATIENT
Start: 2025-01-31 | End: 2025-02-01

## 2025-01-31 RX ORDER — MAGNESIUM OXIDE 400 MG/1
400 TABLET ORAL EVERY 4 HOURS
Status: COMPLETED | OUTPATIENT
Start: 2025-01-31 | End: 2025-01-31

## 2025-01-31 RX ORDER — POTASSIUM CHLORIDE 1500 MG/1
20 TABLET, EXTENDED RELEASE ORAL ONCE
Status: COMPLETED | OUTPATIENT
Start: 2025-01-31 | End: 2025-01-31

## 2025-01-31 RX ORDER — DILTIAZEM HYDROCHLORIDE 60 MG/1
120 CAPSULE, EXTENDED RELEASE ORAL 2 TIMES DAILY
Status: DISCONTINUED | OUTPATIENT
Start: 2025-02-01 | End: 2025-02-04 | Stop reason: HOSPADM

## 2025-01-31 RX ADMIN — RIVAROXABAN 20 MG: 20 TABLET, FILM COATED ORAL at 09:00

## 2025-01-31 RX ADMIN — Medication 400 MG: at 13:44

## 2025-01-31 RX ADMIN — POTASSIUM CHLORIDE 20 MEQ: 1500 TABLET, EXTENDED RELEASE ORAL at 09:01

## 2025-01-31 RX ADMIN — SIMVASTATIN 10 MG: 10 TABLET, FILM COATED ORAL at 21:42

## 2025-01-31 RX ADMIN — FLUTICASONE FUROATE AND VILANTEROL TRIFENATATE 1 PUFF: 200; 25 POWDER RESPIRATORY (INHALATION) at 09:01

## 2025-01-31 RX ADMIN — DILTIAZEM HYDROCHLORIDE 90 MG: 90 CAPSULE, EXTENDED RELEASE ORAL at 20:05

## 2025-01-31 RX ADMIN — FUROSEMIDE 40 MG: 10 INJECTION, SOLUTION INTRAMUSCULAR; INTRAVENOUS at 16:32

## 2025-01-31 RX ADMIN — METOPROLOL TARTRATE 75 MG: 50 TABLET, FILM COATED ORAL at 09:00

## 2025-01-31 RX ADMIN — DILTIAZEM HYDROCHLORIDE 90 MG: 90 CAPSULE, EXTENDED RELEASE ORAL at 09:01

## 2025-01-31 RX ADMIN — Medication 400 MG: at 09:00

## 2025-01-31 RX ADMIN — OSELTAMIVIR PHOSPHATE 75 MG: 75 CAPSULE ORAL at 09:00

## 2025-01-31 RX ADMIN — PANTOPRAZOLE SODIUM 40 MG: 40 TABLET, DELAYED RELEASE ORAL at 09:01

## 2025-01-31 RX ADMIN — MONTELUKAST 10 MG: 10 TABLET, FILM COATED ORAL at 21:42

## 2025-01-31 RX ADMIN — METOPROLOL TARTRATE 100 MG: 100 TABLET, FILM COATED ORAL at 20:04

## 2025-01-31 RX ADMIN — OSELTAMIVIR PHOSPHATE 75 MG: 75 CAPSULE ORAL at 20:05

## 2025-01-31 RX ADMIN — FUROSEMIDE 20 MG: 10 INJECTION, SOLUTION INTRAMUSCULAR; INTRAVENOUS at 13:45

## 2025-01-31 ASSESSMENT — ACTIVITIES OF DAILY LIVING (ADL)
ADLS_ACUITY_SCORE: 42
ADLS_ACUITY_SCORE: 38
ADLS_ACUITY_SCORE: 42
ADLS_ACUITY_SCORE: 42
ADLS_ACUITY_SCORE: 38
ADLS_ACUITY_SCORE: 42
ADLS_ACUITY_SCORE: 38
ADLS_ACUITY_SCORE: 42
ADLS_ACUITY_SCORE: 38
ADLS_ACUITY_SCORE: 38
ADLS_ACUITY_SCORE: 42
ADLS_ACUITY_SCORE: 38
ADLS_ACUITY_SCORE: 42
ADLS_ACUITY_SCORE: 38
ADLS_ACUITY_SCORE: 42
ADLS_ACUITY_SCORE: 42
ADLS_ACUITY_SCORE: 39
ADLS_ACUITY_SCORE: 42
ADLS_ACUITY_SCORE: 38

## 2025-01-31 NOTE — PLAN OF CARE
VSS, no c/o pain, IGLESIAS-with shortness of breath noted at rest--1 time dose of IV lasix given, up ad charlie, CxR pending, k and mag replaced, cards signed off, 2000 fluid restriction, continue to monitor.

## 2025-01-31 NOTE — PLAN OF CARE
Date & Time: 1/31 [id number]  Diagnosis: Influenza A, Afib  Procedures: NA  Orientation/Cognitive: AOx4, French speaking - using jeanna on phone for translation  VS/O2: VSS ex tachy at times, RA  Mobility: Independent  Diet: Cardiac, 2000ml fluid restriction  Pain Management: Denies - PRNs available  Neuro: Intact  Bowel & Bladder: Continent  Skin: +2 BLE edema, refused ACE wraps  Abnormal Labs: K 3.6 (replaced, recheck in AM), BUN 33.1, Mg 1.8 (replacing)  IV Access/Drips/Fluids: R PIV SL  Drains: NA  Tests/Imaging: NA  Consults: Cardiology, hospitalist  Discharge Plan: Pending  Other: LS dim, crackles, exp wheezes. Will need cardioversion in ~3 weeks.

## 2025-01-31 NOTE — PROGRESS NOTES
A&O x4 pleasant and cooperative, translation via patient's translation jeanna on phone. Afib CVR to RVR. Fine crackles to bases, expiratory wheezes. Up independent in room to bedside commode. Acewraps removed overnight. Infrequent dry cough.

## 2025-01-31 NOTE — PROGRESS NOTES
[facility]    Medicine Progress Note - Hospitalist Service    Date of Admission:  1/27/2025    Assessment & Plan   Arline Saini is a 74 year old female with history of CKD 3, hypertension, asthma admitted on 1/27/2025 with cough and fever for 3 days and was found to be in A-fib with RVR.      Acute hypoxemic respiratory failure  Influenza A  h/o Asthma  Several days of cough, fevers, body aches and dyspnea. Presented to the ED in respiratory distress from urgent care where she had a CXR performed that was negative for infiltrates. Requiring 3L of oxygen in the ED. Vital panel positive for Flu A     -Continue DuoNeb.  Had 1 dose of steroid on admission but likely related to fluid overload so continue on IV diuresis.  Additional 20 mg IV Lasix on 01/30 due to leg edema  - continue home inhaler regime  - continue supportive care for the flu  - continue tamiflu    Receiving intermittent Lasix.  Will give additional 20 mg IV Lasix today.  Will repeat chest x-ray    A-fib with RVR Likely related to infection.  Admission troponin of 13 but BNP 4097     - TSH normal  - TTE reviewed  -Has been on IV diltiazem and p.o. metoprolol 75 mg twice daily was started 01/28.  Heart rate still 110-120 and p.o. diltiazem added 01/29  Increased diltiazem 01/30 to 90 mg twice daily  -Heparin gtt. transitioned to xarelto on 1/28     CKD III   - stable, monitor          Diet: Fluid restriction 2000 ML FLUID  Combination Diet Low Saturated Fat Na <2400mg Diet, No Caffeine Diet    DVT Prophylaxis: DOAC  Mendez Catheter: Not present  Lines: None     Cardiac Monitoring: ACTIVE order. Indication: Tachyarrhythmias, acute (48 hours)  Code Status: Full Code      Clinically Significant Risk Factors                   # Hypertension: Noted on problem list                # Asthma: noted on problem list        Social Drivers of Health    Tobacco Use: Medium Risk (1/27/2025)    Patient History     Smoking Tobacco Use: Former  "    Smokeless Tobacco Use: Never   Physical Activity: Inactive (6/5/2024)    Exercise Vital Sign     Days of Exercise per Week: 0 days     Minutes of Exercise per Session: 0 min   Social Connections: Unknown (6/5/2024)    Social Connection and Isolation Panel [NHANES]     Frequency of Social Gatherings with Friends and Family: Three times a week          Disposition Plan     Medically Ready for Discharge: Anticipated Tomorrow       Caleb Hill MD  Hospitalist Service  [facility]  Securely message with Vocera (more info)  Text page via [employer] Paging/Directory   \"This dictation was performed with voice recognition software and may contain errors,  omissions and inadvertent word substitution.\"  ______________________________________________________________________    Interval History   Last 24-hour events noted.   Breathing stable.  Heart rate still 100-1 20    Physical Exam   /89 (BP Location: Left arm)   Pulse 103   Temp 99.4  F (37.4  C) (Oral)   Resp 18   Wt 102.5 kg (226 lb)   SpO2 96%   BMI 37.20 kg/m    Gen- pleasant   Neck- supple  CVS- I+II+ no m/r/g, tachycardia  RS- CTAB, decreased at base  Abdo- soft, no tenderness . No g/r/r  Ext-edema       Medical Decision Making       51 MINUTES SPENT BY ME on the date of service doing chart review, history, exam, documentation & further activities per the note.  Discussion patient, her daughter, bedside RN, cardiology service    Data   ------------------------- PAST 24 HR DATA REVIEWED -----------------------------------------------    I have personally reviewed the following data over the past 24 hrs:    N/A  \   N/A   / N/A     138 103 33.1 (H) /  102 (H)   3.6 23 0.97 (H) \       Imaging results reviewed over the past 24 hrs:   No results found for this or any previous visit (from the past 24 hours).    "

## 2025-01-31 NOTE — PROGRESS NOTES
[facility]    Cardiology Progress Note    Primary Cardiologist: Dr. Walker    Date of Admission: 1/27/2025  Service Date: 01/31/25    Summary:  Ms. Arline Saini is a very pleasant 74 year old female with a past medical history of obesity and hypertension who was admitted on 1/27/2025 for fever and cough x 3 days. Cardiology was consulted for atrial fibrillation with RVR.    Interval History   Rates better controlled today. No significant pauses on tele or bradycardia. Edema improved with ace wraps and continued diuresis. Weight down 3 lbs.     Continues to have cough and wheezing.     Telemetry: Atrial fibrillation, rates 80-90s    Assessment & Plan   1.  Atrial fibrillation in the setting of influenza, [id number] 3  - Rate control improved following diuresis, up titration of metoprolol and diltizem, suspect rates will continue to improve with ongoing recovery from influenza and diltiazem/metoprolol titration   -TTE revealed low normal LVEF 50-55% and moderate bi atrial enlargement with IVC dilation-> furosemide given x3    2.  Influenza A infection, symptomatically improving     3.  Obesity, BMI 38    4. Essential hypertension  - PTA amlodipine 5mg daily, losartan 100mg daily, spironolactone 100mg daily,     5. Thrombocytopenia   - Platelet low on admission 142->now normalized     6. Ascending aortic dilation, stable, measuring 4.0cm    Plan:   1. Continue xarelto 20mg daily   2. Continue oral diltiazem to 90mg BID  3. Continue metoprolol tartrate 75mg BID  4. Continue simvastatin 10mg daily   5. Plan for outpatient cardioversion in 3-4 weeks following recovery from influenza and uninterrupted anticoagulation   6. Recommend continued daily compression stockings following discharge, would not recommend resumption of amlodipine with LE edema concerns, hold spironolactone in lieu of higher dose AVN blocking agents  7. Cardiology will sign off and outpatient follow up arranged on  3/6/25    Thank you for the opportunity to participate in this pleasant patient's care.     REGGIE Perez, CNP   Nurse Practitioner  [facility]  (8am - 5pm, M-F)    Patient Active Problem List   Diagnosis    Essential hypertension    Obesity (BMI 35.0-39.9) with comorbidity (H)    Aortic root dilatation    External hemorrhoids    Chronic kidney disease, stage 3 (H)    Allergic bronchitis without complication    Moderate persistent asthma without complication    Posttraumatic stress disorder    Dyslipidemia    Cerebrovascular disease    Atrial fibrillation with rapid ventricular response (H)    Influenza A    New onset atrial fibrillation (H)    Atrial fibrillation with RVR (H)    Elevated brain natriuretic peptide (BNP) level       Physical Exam   Temp: 99.4  F (37.4  C) Temp src: Oral BP: 101/89 Pulse: 103   Resp: 18 SpO2: 96 % O2 Device: None (Room air)    Vitals:    01/29/25 0640 01/30/25 0609 01/31/25 0645   Weight: 105.1 kg (231 lb 9.6 oz) 103.9 kg (229 lb) 102.5 kg (226 lb)     Vital Signs with Ranges  Temp:  [97.7  F (36.5  C)-99.4  F (37.4  C)] 99.4  F (37.4  C)  Pulse:  [] 103  Resp:  [18] 18  BP: (101-133)/(89-97) 101/89  SpO2:  [94 %-97 %] 96 %  No intake/output data recorded.    Constitutional:  Appears her stated age, well nourished, and in no acute distress.  Eyes: Pupils equal, round. Sclerae anicteric.   HEENT: Normocephalic, atraumatic.   Neck: Supple. No JVD appreciated.  Respiratory: Breathing non-labored. Audible wheezing. Crackles throught all lobes  Cardiovascular: Irregularly irregular rate and rhythm, normal S1 and S2. No murmur, rub, or gallop.  GI: Soft, non-distended  Skin: Warm, dry. trace BLE edema into feet  Musculoskeletal/Extremities: Moves all extremities well and symmetrically.   Neurologic: No gross focal deficits. Alert, awake, and oriented to person, place and time.  Psychiatric: Affect appropriate. Mentation normal.    Medications   Current  Facility-Administered Medications   Medication Dose Route Frequency Provider Last Rate Last Admin    No anticoagulants IF patient has had acute trauma/surgery or recent intracranial, GI or urinary tract bleeding.    Other DOES NOT GO TO Caleb Lazar MD         Current Facility-Administered Medications   Medication Dose Route Frequency Provider Last Rate Last Admin    diltiazem ER (CARDIZEM SR) 12 hr capsule 90 mg  90 mg Oral BID Andreea Mancilla NP   90 mg at 01/30/25 2158    fluticasone-vilanterol (BREO ELLIPTA) 200-25 MCG/ACT inhaler 1 puff  1 puff Inhalation Daily Caleb Hill MD   1 puff at 01/30/25 0948    magnesium oxide (MAG-OX) tablet 400 mg  400 mg Oral Q4H Caleb Hill MD        metoprolol tartrate (LOPRESSOR) tablet 75 mg  75 mg Oral BID Pedro Light MD   75 mg at 01/30/25 2158    montelukast (SINGULAIR) tablet 10 mg  10 mg Oral At Bedtime Caleb Hill MD   10 mg at 01/30/25 2158    oseltamivir (TAMIFLU) capsule 75 mg  75 mg Oral BID Caleb Hill MD   75 mg at 01/30/25 2158    pantoprazole (PROTONIX) EC tablet 40 mg  40 mg Oral Daily Nai Cha DO   40 mg at 01/30/25 0948    potassium chloride rocio ER (KLOR-CON M20) CR tablet 20 mEq  20 mEq Oral Once Caleb Hill MD        rivaroxaban ANTICOAGULANT (XARELTO) tablet 20 mg  20 mg Oral Daily with breakfast Pedro Light MD   20 mg at 01/30/25 0948    simvastatin (ZOCOR) tablet 10 mg  10 mg Oral At Bedtime Caleb Hill MD   10 mg at 01/30/25 2158    sodium chloride (PF) 0.9% PF flush 3 mL  3 mL Intracatheter Q8H Caleb Hill MD   3 mL at 01/30/25 1417       Data   No results found for this or any previous visit (from the past 24 hours).      Recent Labs   Lab 01/29/25  0624 01/28/25  1801 01/28/25  0823   WBC 7.9 6.9 4.8   HGB 13.5 13.0 11.7   HCT 40.5 38.7 35.4   MCV 90 89 89    167 123*     Recent Labs   Lab 01/31/25  0640 01/30/25  0718 01/29/25  0624    138 139   POTASSIUM 3.6 4.1 4.2   CHLORIDE 103  102 103   CO2 23 23 23   ANIONGAP 12 13 13   * 141* 108*   BUN 33.1* 34.0* 31.1*   CR 0.97* 1.07* 1.12*   GFRESTIMATED 61 55* 52*   YOANDY 8.8 9.2 8.7*        This note was completed in part using Dragon voice recognition software. Although reviewed after completion, some word and grammatical errors may occur.

## 2025-02-01 ENCOUNTER — APPOINTMENT (OUTPATIENT)
Dept: CT IMAGING | Facility: CLINIC | Age: 75
End: 2025-02-01
Attending: INTERNAL MEDICINE
Payer: COMMERCIAL

## 2025-02-01 LAB
ANION GAP SERPL CALCULATED.3IONS-SCNC: 13 MMOL/L (ref 7–15)
BUN SERPL-MCNC: 24.7 MG/DL (ref 8–23)
CALCIUM SERPL-MCNC: 8.9 MG/DL (ref 8.8–10.4)
CHLORIDE SERPL-SCNC: 99 MMOL/L (ref 98–107)
CREAT SERPL-MCNC: 1 MG/DL (ref 0.51–0.95)
CRP SERPL-MCNC: 40.14 MG/L
EGFRCR SERPLBLD CKD-EPI 2021: 59 ML/MIN/1.73M2
GLUCOSE SERPL-MCNC: 90 MG/DL (ref 70–99)
HCO3 SERPL-SCNC: 26 MMOL/L (ref 22–29)
MAGNESIUM SERPL-MCNC: 1.9 MG/DL (ref 1.7–2.3)
POTASSIUM SERPL-SCNC: 3.6 MMOL/L (ref 3.4–5.3)
POTASSIUM SERPL-SCNC: 3.7 MMOL/L (ref 3.4–5.3)
PROCALCITONIN SERPL IA-MCNC: 0.04 NG/ML
SODIUM SERPL-SCNC: 138 MMOL/L (ref 135–145)

## 2025-02-01 PROCEDURE — 84145 PROCALCITONIN (PCT): CPT | Performed by: INTERNAL MEDICINE

## 2025-02-01 PROCEDURE — 99232 SBSQ HOSP IP/OBS MODERATE 35: CPT | Performed by: INTERNAL MEDICINE

## 2025-02-01 PROCEDURE — 84132 ASSAY OF SERUM POTASSIUM: CPT | Performed by: INTERNAL MEDICINE

## 2025-02-01 PROCEDURE — 250N000013 HC RX MED GY IP 250 OP 250 PS 637: Performed by: INTERNAL MEDICINE

## 2025-02-01 PROCEDURE — 86140 C-REACTIVE PROTEIN: CPT | Performed by: INTERNAL MEDICINE

## 2025-02-01 PROCEDURE — 83735 ASSAY OF MAGNESIUM: CPT | Performed by: INTERNAL MEDICINE

## 2025-02-01 PROCEDURE — 36415 COLL VENOUS BLD VENIPUNCTURE: CPT | Performed by: INTERNAL MEDICINE

## 2025-02-01 PROCEDURE — 210N000002 HC R&B HEART CARE

## 2025-02-01 PROCEDURE — 250N000013 HC RX MED GY IP 250 OP 250 PS 637: Performed by: STUDENT IN AN ORGANIZED HEALTH CARE EDUCATION/TRAINING PROGRAM

## 2025-02-01 PROCEDURE — 71250 CT THORAX DX C-: CPT

## 2025-02-01 PROCEDURE — 80048 BASIC METABOLIC PNL TOTAL CA: CPT | Performed by: INTERNAL MEDICINE

## 2025-02-01 RX ORDER — POTASSIUM CHLORIDE 1500 MG/1
20 TABLET, EXTENDED RELEASE ORAL DAILY
Status: DISCONTINUED | OUTPATIENT
Start: 2025-02-02 | End: 2025-02-04 | Stop reason: HOSPADM

## 2025-02-01 RX ORDER — FUROSEMIDE 40 MG/1
40 TABLET ORAL DAILY
Status: DISCONTINUED | OUTPATIENT
Start: 2025-02-01 | End: 2025-02-04

## 2025-02-01 RX ORDER — POTASSIUM CHLORIDE 1500 MG/1
20 TABLET, EXTENDED RELEASE ORAL ONCE
Status: COMPLETED | OUTPATIENT
Start: 2025-02-01 | End: 2025-02-01

## 2025-02-01 RX ORDER — MAGNESIUM OXIDE 400 MG/1
400 TABLET ORAL EVERY 4 HOURS
Status: COMPLETED | OUTPATIENT
Start: 2025-02-01 | End: 2025-02-01

## 2025-02-01 RX ADMIN — MONTELUKAST 10 MG: 10 TABLET, FILM COATED ORAL at 22:14

## 2025-02-01 RX ADMIN — OSELTAMIVIR PHOSPHATE 75 MG: 75 CAPSULE ORAL at 22:14

## 2025-02-01 RX ADMIN — RIVAROXABAN 20 MG: 20 TABLET, FILM COATED ORAL at 10:34

## 2025-02-01 RX ADMIN — DILTIAZEM HYDROCHLORIDE 120 MG: 60 CAPSULE, EXTENDED RELEASE ORAL at 22:14

## 2025-02-01 RX ADMIN — FUROSEMIDE 40 MG: 40 TABLET ORAL at 16:31

## 2025-02-01 RX ADMIN — PANTOPRAZOLE SODIUM 40 MG: 40 TABLET, DELAYED RELEASE ORAL at 10:34

## 2025-02-01 RX ADMIN — Medication 400 MG: at 10:35

## 2025-02-01 RX ADMIN — METOPROLOL TARTRATE 100 MG: 100 TABLET, FILM COATED ORAL at 10:35

## 2025-02-01 RX ADMIN — Medication 400 MG: at 14:42

## 2025-02-01 RX ADMIN — POTASSIUM CHLORIDE 20 MEQ: 1500 TABLET, EXTENDED RELEASE ORAL at 10:35

## 2025-02-01 RX ADMIN — OSELTAMIVIR PHOSPHATE 75 MG: 75 CAPSULE ORAL at 10:34

## 2025-02-01 RX ADMIN — DILTIAZEM HYDROCHLORIDE 120 MG: 60 CAPSULE, EXTENDED RELEASE ORAL at 10:40

## 2025-02-01 RX ADMIN — SIMVASTATIN 10 MG: 10 TABLET, FILM COATED ORAL at 22:14

## 2025-02-01 RX ADMIN — FLUTICASONE FUROATE AND VILANTEROL TRIFENATATE 1 PUFF: 200; 25 POWDER RESPIRATORY (INHALATION) at 10:41

## 2025-02-01 RX ADMIN — METOPROLOL TARTRATE 100 MG: 100 TABLET, FILM COATED ORAL at 22:14

## 2025-02-01 ASSESSMENT — ACTIVITIES OF DAILY LIVING (ADL)
ADLS_ACUITY_SCORE: 42
ADLS_ACUITY_SCORE: 44
ADLS_ACUITY_SCORE: 42
ADLS_ACUITY_SCORE: 44
ADLS_ACUITY_SCORE: 42
ADLS_ACUITY_SCORE: 42
ADLS_ACUITY_SCORE: 44
ADLS_ACUITY_SCORE: 42
ADLS_ACUITY_SCORE: 44
ADLS_ACUITY_SCORE: 42
ADLS_ACUITY_SCORE: 42
ADLS_ACUITY_SCORE: 44
ADLS_ACUITY_SCORE: 42
ADLS_ACUITY_SCORE: 44

## 2025-02-01 NOTE — PLAN OF CARE
Goal Outcome Evaluation:  [id number]  Neuro- x4 Algerian speaking   Most Recent Vitals- BP (!) 138/101 (BP Location: Left arm)   Pulse 70   Temp 97.9  F (36.6  C) (Oral)   Resp 19   Wt 102.5 kg (226 lb)   SpO2 96%   BMI 37.20 kg/m    Tele/Cardiac- Afib CVR/RVR   Resp- RA, IGLESIAS, infrequent dry cough   Activity- independent   Pain- denies   GI/- WDL   Diet: Fluid restriction 2000 ML FLUID  Combination Diet Low Saturated Fat Na <2400mg Diet, No Caffeine Diet    Plan- continue iv lasix, ace wraps and dccv output 3-4 weeks

## 2025-02-01 NOTE — PROGRESS NOTES
[facility]    Medicine Progress Note - Hospitalist Service    Date of Admission:  1/27/2025    Assessment & Plan   Arline Saini is a 74 year old female with history of CKD 3, hypertension, asthma admitted on 1/27/2025 with cough and fever for 3 days and was found to be in A-fib with RVR.      Acute hypoxemic respiratory failure  Influenza A  h/o Asthma  Several days of cough, fevers, body aches and dyspnea. Presented to the ED in respiratory distress from urgent care where she had a CXR performed that was negative for infiltrates. Requiring 3L of oxygen in the ED. Vital panel positive for Flu A     - Had 1 dose of steroid on admission but likely related to fluid overload so continue on IV diuresis.  - continue home inhaler regime  - continue supportive care for the flu  - continue tamiflu    Receiving intermittent Lasix.  Appreciate cardiology review of 01/31.  Requested CT chest  Hold further Lasix for now    A-fib with RVR Likely related to infection.  Admission troponin of 13 but BNP 4097     - TSH normal  - TTE reviewed  -Has been on IV diltiazem and p.o. metoprolol 75 mg twice daily was started 01/28.  Heart rate still 110-120 and p.o. diltiazem added 01/29  Increased diltiazem 01/30 to 90 mg twice daily and then to 120 mg twice daily on 02/01 due to persistent heart rate in the 120-130  -Heparin gtt. transitioned to xarelto on 1/28     CKD III   - stable, monitor          Diet: Fluid restriction 2000 ML FLUID  Combination Diet Low Saturated Fat Na <2400mg Diet, No Caffeine Diet    DVT Prophylaxis: DOAC  Mendez Catheter: Not present  Lines: None     Cardiac Monitoring: ACTIVE order. Indication: Tachyarrhythmias, acute (48 hours)  Code Status: Full Code      Clinically Significant Risk Factors                   # Hypertension: Noted on problem list                # Asthma: noted on problem list        Social Drivers of Health    Tobacco Use: Medium Risk (1/27/2025)    Patient  "History     Smoking Tobacco Use: Former     Smokeless Tobacco Use: Never   Physical Activity: Inactive (6/5/2024)    Exercise Vital Sign     Days of Exercise per Week: 0 days     Minutes of Exercise per Session: 0 min   Social Connections: Unknown (6/5/2024)    Social Connection and Isolation Panel [NHANES]     Frequency of Social Gatherings with Friends and Family: Three times a week          Disposition Plan     Medically Ready for Discharge: Anticipated in 2-4 Days       Caleb Hill MD  Hospitalist Service  [facility]  Securely message with Vocera (more info)  Text page via [employer] Paging/Directory   \"This dictation was performed with voice recognition software and may contain errors,  omissions and inadvertent word substitution.\"  ______________________________________________________________________    Interval History   Last 24-hour events noted.   Feels dry.  Denies any chest pain/palpitations.  Continues to have mild cough.  Leg edema resolved    Physical Exam   BP (!) 122/94   Pulse 76   Temp 97.5  F (36.4  C) (Oral)   Resp 20   Wt 99.7 kg (219 lb 12.8 oz)   SpO2 96%   BMI 36.18 kg/m    Gen- pleasant   Neck- supple  CVS- I+II+ no m/r/g, tachycardia  RS- CTAB, decreased at base  Abdo- soft, no tenderness . No g/r/r  Ext-no edema       Medical Decision Making       40 MINUTES SPENT BY ME on the date of service doing chart review, history, exam, documentation & further activities per the note.  Discussion patient, her daughter, bedside RN    Data   ------------------------- PAST 24 HR DATA REVIEWED -----------------------------------------------    I have personally reviewed the following data over the past 24 hrs:    N/A  \   N/A   / N/A     138 99 24.7 (H) /  90   3.6; 3.7 26 1.00 (H) \     Trop: N/A BNP: N/A       Imaging results reviewed over the past 24 hrs:   Recent Results (from the past 24 hours)   XR Chest 2 Views    Narrative    EXAM: XR CHEST 2 VIEWS  LOCATION:  " [facility]  DATE: 1/31/2025    INDICATION: persistent cough . r o pneumonia  COMPARISON: 01/27/2025      Impression    IMPRESSION: Negative chest.

## 2025-02-01 NOTE — PROGRESS NOTES
[facility] ESTABLISHED PATIENT CARDIOLOGY PROGRESS NOTE    Patient Name: Arline Saini  Age:74 year old   Sex: female   Admission Date: 1/27/2025      SUBJECTIVE:   No complaints.  Atrial fibrillation rate controlled with heart rate in the 70s (on metoprolol 100 mg twice daily and Cardizem  mg twice daily).      PERTINENT INTERVAL EVENTS:   None.      CURRENT MEDICATIONS:     Current Facility-Administered Medications   Medication Dose Route Frequency Provider Last Rate Last Admin    acetaminophen (TYLENOL) tablet 650 mg  650 mg Oral Q4H PRN Caleb Hill MD        Or    acetaminophen (TYLENOL) Suppository 650 mg  650 mg Rectal Q4H PRN Caleb Hill MD        calcium carbonate (TUMS) chewable tablet 1,000 mg  1,000 mg Oral 4x Daily PRN Caleb Hill MD        [START ON 2/1/2025] diltiazem ER (CARDIZEM SR) 12 hr capsule 120 mg  120 mg Oral BID Stacia Dow MD, MD        fluticasone-vilanterol (BREO ELLIPTA) 200-25 MCG/ACT inhaler 1 puff  1 puff Inhalation Daily Caleb Hill MD   1 puff at 01/31/25 0901    furosemide (LASIX) injection 40 mg  40 mg Intravenous Daily Andreea Mancilla NP   40 mg at 01/31/25 1632    levalbuterol (XOPENEX) neb solution 1.25 mg  1.25 mg Nebulization Q6H PRN Debbie Murdock MD   1.25 mg at 01/27/25 1647    lidocaine (LMX4) cream   Topical Q1H PRN Caleb Hill MD        lidocaine 1 % 0.1-1 mL  0.1-1 mL Other Q1H PRN Caleb Hill MD        metoprolol tartrate (LOPRESSOR) tablet 100 mg  100 mg Oral BID Pedro Light MD   100 mg at 01/31/25 2004    montelukast (SINGULAIR) tablet 10 mg  10 mg Oral At Bedtime Caleb Hill MD   10 mg at 01/31/25 2142    No anticoagulants IF patient has had acute trauma/surgery or recent intracranial, GI or urinary tract bleeding.    Other DOES NOT GO TO Caleb Lazar MD        ondansetron (ZOFRAN ODT) ODT tab 4 mg  4 mg Oral Q6H PRN Caleb Hill MD        Or    ondansetron (ZOFRAN) injection 4 mg  4 mg Intravenous Q6H PRN  Caleb Hill MD        oseltamivir (TAMIFLU) capsule 75 mg  75 mg Oral BID Caleb Hill MD   75 mg at 25    pantoprazole (PROTONIX) EC tablet 40 mg  40 mg Oral Daily Nai Cha DO   40 mg at 25 0901    rivaroxaban ANTICOAGULANT (XARELTO) tablet 20 mg  20 mg Oral Daily with breakfast Pedro Light MD   20 mg at 25 0900    senna-docusate (SENOKOT-S/PERICOLACE) 8.6-50 MG per tablet 1 tablet  1 tablet Oral BID PRN Caleb Hill MD        Or    senna-docusate (SENOKOT-S/PERICOLACE) 8.6-50 MG per tablet 2 tablet  2 tablet Oral BID PRN Caleb Hill MD        simvastatin (ZOCOR) tablet 10 mg  10 mg Oral At Bedtime Caleb Hill MD   10 mg at 25    sodium chloride (PF) 0.9% PF flush 3 mL  3 mL Intracatheter Q8H Caleb Hill MD   3 mL at 25    sodium chloride (PF) 0.9% PF flush 3 mL  3 mL Intracatheter q1 min prn Caleb Hill MD             ALLERGIES:   No Known Allergies      PHYSICAL EXAM:   Vitals were reviewed  Blood pressure (!) 134/95, pulse (!) 140, temperature 98.2  F (36.8  C), temperature source Oral, resp. rate 22, weight 102.5 kg (226 lb), SpO2 97%, not currently breastfeeding.  Temperatures:  Current - Temp: 98.2  F (36.8  C); Max - Temp  Av.4  F (36.9  C)  Min: 97.7  F (36.5  C)  Max: 99.4  F (37.4  C)  Respiration range: Resp  Av.4  Min: 18  Max: 22  Pulse range: Pulse  Av.8  Min: 70  Max: 140  Blood pressure range: Systolic (24hrs), Av , Min:101 , Max:138   ; Diastolic (24hrs), Av, Min:89, Max:101    Pulse oximetry range: SpO2  Av %  Min: 94 %  Max: 97 %    Intake/Output Summary (Last 24 hours) at 2025 2218  Last data filed at 2025 1840  Gross per 24 hour   Intake 720 ml   Output 1775 ml   Net -1055 ml       GENERAL: Alert and oriented and in no acute distress    HEENT: No scleral icterus or conjunctival hemorrhage. Mucous membranes moist.    NECK: Supple without thyromegaly. No carotid bruits are  "present.  JVP difficult to assess    CARDIOVASCULAR: Irregular rate and rhythm, normal S1, normal S2, no S3 or S4, no murmurs, rubs or gallops.    RESPIRATORY: Clear to auscultation without crackles or wheezes. Respiratory effort is normal.    GASTROINTESTINAL: Soft, non-tender, normal bowel sounds.    PULSES: Grossly normal and symmetric lower extremity pulses.    EXTREMITIES: No clubbing or cyanosis.  Trace edema.    SKIN: Warm and dry. No obvious rashes.    NEURO: Alert and appropriate. No obvious focal deficits.        LABS AND DIAGNOSTICS:  Labs personally reviewed by myself    CARDIAC ENZYMES:  No lab results found in last 7 days.  Recent Labs   Lab 01/31/25  0640 01/27/25  1429   NTBNPI 1,000* 4,097*       GENERAL:  Recent Labs   Lab 01/31/25  0640 01/30/25  0718 01/29/25  0624 01/28/25  1801 01/28/25  0823   WBC  --   --  7.9 6.9 4.8   HGB  --   --  13.5 13.0 11.7   MCV  --   --  90 89 89   PLT  --   --  166 167 123*    138 139  --  139   POTASSIUM 3.6 4.1 4.2  --  4.1   CHLORIDE 103 102 103  --  104   CO2 23 23 23  --  23   BUN 33.1* 34.0* 31.1*  --  25.2*   CR 0.97* 1.07* 1.12*  --  0.96*   GFRESTIMATED 61 55* 52*  --  62   ANIONGAP 12 13 13  --  12   YOANDY 8.8 9.2 8.7*  --  8.7*   * 141* 108*  --  132*       No results for input(s): \"SED\", \"CRP\" in the last 168 hours.    No results for input(s): \"DD\" in the last 168 hours.    Recent Labs   Lab 01/27/25  2118   TSH 0.70       LIPIDS:  Recent Labs   Lab Test 06/06/24  1035 05/31/23  0935   CHOL 198 203*   HDL 67 64   * 129*   TRIG 88 52       BLOOD GASES:  No results for input(s): \"PH\", \"PHV\", \"PO2\", \"PO2V\", \"SAT\", \"PCO2\", \"PCO2V\", \"HCO3\", \"HCO3V\" in the last 168 hours.      ECG (personally reviewed):  1/27/2025: Atrial fibrillation with rapid ventricular response at 132 bpm, QTc 470 ms.      Chest X-ray (personally reviewed) 1/27/2025:  Negative chest.     Echocardiogram 1/28/2025:  Left ventricular systolic function is normal.  The " visual ejection fraction is 50-55%.  Normal right ventricular size and systolic function.  Moderate biatrial enlargement.  Mild mitral valve regurgitation.  Dilated inferior vena cava.  Stable ascending aorta dilatation of 4.0 cm.  Rhythm is atrial fibrillation.      Imaging (past 48 hours):  Recent Results (from the past 48 hours)   XR Chest 2 Views    Narrative    EXAM: XR CHEST 2 VIEWS  LOCATION: [facility]  DATE: 1/31/2025    INDICATION: persistent cough . r o pneumonia  COMPARISON: 01/27/2025      Impression    IMPRESSION: Negative chest.           IMPRESSION AND PLAN:     Patient Active Problem List   Diagnosis    Essential hypertension    Obesity (BMI 35.0-39.9) with comorbidity (H)    Aortic root dilatation    External hemorrhoids    Chronic kidney disease, stage 3 (H)    Allergic bronchitis without complication    Moderate persistent asthma without complication    Posttraumatic stress disorder    Dyslipidemia    Cerebrovascular disease    Atrial fibrillation with rapid ventricular response (H)    Influenza A    New onset atrial fibrillation (H)    Atrial fibrillation with RVR (H)    Elevated brain natriuretic peptide (BNP) level       Arline Saini is a 74 year old female with past medical history pertinent for hypertension, asthma, chronic kidney disease (baseline creatinine 1.0), and several other noncardiac comorbidities who was admitted on 1/27/2025 with cough/fever for 3 days; tested positive for influenza A pneumonia.  Also found to be in new onset atrial fibrillation with rapid ventricular response.  Evidence of diastolic CHF.  Echocardiogram with LVEF 50-55% and no significant valve disease.    Atrial fibrillation with rapid ventricular response:  A.  New diagnosis of AF/RVR; clinical context of influenza A pneumonia.  Echocardiogram with LVEF 50-55% and moderate biatrial enlargement.  B.  Currently treated with metoprolol 100 mg twice daily and Cardizem  mg twice  daily; current heart rate control is adequate.  Continue current metoprolol and Cardizem dose.  C.  Initiated on anticoagulation with Xarelto 20 mg daily.  D.  I would favor rate control approach for now given clinical context of influenza A pneumonia.  Following recovery from influenza, recommend outpatient cardioversion in 4 weeks (would not need BERNARDO at that time with 3-4 weeks of anticoagulation).  Outpatient follow-up arranged for 3/6/2025.      Acute diastolic CHF:  A.  Likely precipitated by atrial fibrillation with RVR and influenza A pneumonia.  B.  Approaching euvolemic status.  Recommend furosemide 40 mg daily and K-Dur 20 mEq daily (this replaces prior to admission spironolactone)    Hypertension:  A.  Prior to admission medications consisted of amlodipine 5 mg daily, losartan 100 mg daily, and spironolactone 100 mg daily.  Medications have been changed since admission.  B.  Currently taking metoprolol 100 mg twice daily and Cardizem  mg twice daily.     Dyslipidemia:  A.  Simvastatin 10 mg daily.      I provided an update and discussed the plan with her daughter Jessica.  He was requesting another BNP level tomorrow; I told her I do not think this would be too helpful.    No further cardiology recommendations.  Recommend continuing rate control regimen of metoprolol 100 twice daily and Cardizem  mg twice daily.  Continue Xarelto 20 mg daily.  Start furosemide 40 mg daily and K-Dur 20 mEq daily.  Discontinue prior to admission amlodipine/losartan/spironolactone.  I have arranged cardiology clinic follow-up in 4 weeks with PRICILLA provider.  If she remains in atrial fibrillation then we will consider electrical cardioversion at that time.  Cardiology service will sign off.  Please call if questions or concerns.    Thank you very much for asking me to participate in the care of your patient Arline Saini. Please do not hesitate to contact me if you have questions, or if I can be of further  assistance.    High complexity     Stacia Dow MD MD, FACC, FASE, [facility]

## 2025-02-01 NOTE — PROGRESS NOTES
Patient is AO X 4; VSS; on RA; Afib, rate controlled with cardizem and metoprolol PO.   IV Lasix was replaced with PO Lasix.  Cardiology signed off.  Independent in the room, continent.

## 2025-02-01 NOTE — PLAN OF CARE
Goal Outcome Evaluation:  [id number]  Neuro- A&Ox4 Bruneian speaking only   Tele/Cardiac- Afib CVR/RVR   Resp- RA, IGLESIAS, infrequent dry cough   Activity- independent   Pain- denies   GI/- WDL   Diet: Fluid restriction 2000 ML FLUID  Combination Diet Low Saturated Fat Na <2400mg Diet, No Caffeine Diet    Plan- continue iv lasix, ace wraps and dccv output 3-4 weeks        Problem: Adult Inpatient Plan of Care  Goal: Plan of Care Review  Description: The Plan of Care Review/Shift note should be completed every shift.  The Outcome Evaluation is a brief statement about your assessment that the patient is improving, declining, or no change.  This information will be displayed automatically on your shift  note.  Outcome: Progressing  Flowsheets (Taken 2/1/2025 0510)  Plan of Care Reviewed With: patient  Overall Patient Progress: improving  Goal: Absence of Hospital-Acquired Illness or Injury  Intervention: Identify and Manage Fall Risk  Recent Flowsheet Documentation  Taken 2/1/2025 0400 by Melissa Downing RN  Safety Promotion/Fall Prevention:   assistive device/personal items within reach   clutter free environment maintained   room near nurse's station   room organization consistent   safety round/check completed  Taken 1/31/2025 2004 by Melissa Downing RN  Safety Promotion/Fall Prevention:   assistive device/personal items within reach   clutter free environment maintained   room near nurse's station   room organization consistent   safety round/check completed  Intervention: Prevent Skin Injury  Recent Flowsheet Documentation  Taken 2/1/2025 0400 by Melissa Downing, RN  Body Position: position changed independently  Taken 1/31/2025 2004 by Melissa Downing, RN  Body Position: position changed independently  Intervention: Prevent and Manage VTE (Venous Thromboembolism) Risk  Recent Flowsheet Documentation  Taken 2/1/2025 0400 by Melissa Downing, RN  VTE Prevention/Management: compression stockings off  Taken 1/31/2025 2004 by  Buddhism, Melissa, RN  VTE Prevention/Management: compression stockings off  Intervention: Prevent Infection  Recent Flowsheet Documentation  Taken 2/1/2025 0400 by Melissa Downing RN  Infection Prevention:   hand hygiene promoted   personal protective equipment utilized   rest/sleep promoted   single patient room provided  Taken 1/31/2025 2004 by Melissa Downing RN  Infection Prevention:   hand hygiene promoted   personal protective equipment utilized   rest/sleep promoted   single patient room provided  Goal: Optimal Comfort and Wellbeing  Intervention: Provide Person-Centered Care  Recent Flowsheet Documentation  Taken 2/1/2025 0400 by Melissa Downing RN  Trust Relationship/Rapport:   care explained   choices provided   emotional support provided   empathic listening provided   questions answered   questions encouraged   reassurance provided   thoughts/feelings acknowledged  Taken 1/31/2025 2004 by Melissa Downing RN  Trust Relationship/Rapport:   care explained   choices provided   emotional support provided   empathic listening provided   questions answered   questions encouraged   reassurance provided   thoughts/feelings acknowledged     Problem: Skin Injury Risk Increased  Goal: Skin Health and Integrity  Intervention: Plan: Nurse Driven Intervention: Moisture Management  Recent Flowsheet Documentation  Taken 1/31/2025 2000 by Melissa Downing RN  Moisture Interventions:   Encourage regular toileting   Incontinence pad  Bathing/Skin Care: patient refused  Intervention: Optimize Skin Protection  Recent Flowsheet Documentation  Taken 2/1/2025 0400 by Melissa Downing RN  Pressure Reduction Techniques: frequent weight shift encouraged  Pressure Reduction Devices: positioning supports utilized  Activity Management: up ad charlie  Head of Bed (HOB) Positioning: HOB at 20-30 degrees  Taken 1/31/2025 2004 by Melissa Downing RN  Pressure Reduction Techniques: frequent weight shift encouraged  Pressure Reduction Devices: positioning  supports utilized  Activity Management: up ad charlie  Head of Bed (HOB) Positioning: HOB at 20-30 degrees     Problem: Dysrhythmia  Goal: Normalized Cardiac Rhythm  Intervention: Monitor and Manage Cardiac Rhythm Effect  Recent Flowsheet Documentation  Taken 2/1/2025 0400 by Melissa Downing RN  VTE Prevention/Management: compression stockings off  Taken 1/31/2025 2004 by Melissa Downing RN  VTE Prevention/Management: compression stockings off     Problem: Heart Failure  Goal: Optimal Cardiac Output  Intervention: Optimize Cardiac Output  Recent Flowsheet Documentation  Taken 2/1/2025 0400 by Melissa Downing RN  Environmental Support:   calm environment promoted   personal routine supported  Taken 1/31/2025 2004 by Melissa Downing RN  Environmental Support:   calm environment promoted   personal routine supported  Goal: Fluid and Electrolyte Balance  Intervention: Monitor and Manage Fluid and Electrolyte Balance  Recent Flowsheet Documentation  Taken 2/1/2025 0400 by Melissa Downing RN  Fluid/Electrolyte Management: fluids restricted  Taken 1/31/2025 2004 by Melissa Downing RN  Fluid/Electrolyte Management: fluids restricted  Goal: Optimal Functional Ability  Intervention: Optimize Functional Ability  Recent Flowsheet Documentation  Taken 2/1/2025 0400 by Melissa Downing RN  Activity Management: up ad charlie  Taken 1/31/2025 2004 by Melissa Downing RN  Activity Management: up ad charlie  Goal: Effective Oxygenation and Ventilation  Intervention: Promote Airway Secretion Clearance  Recent Flowsheet Documentation  Taken 2/1/2025 0400 by Melissa Downing RN  Cough And Deep Breathing: done independently per patient  Activity Management: up ad charlie  Taken 1/31/2025 2004 by Melissa Downing RN  Cough And Deep Breathing: done independently per patient  Activity Management: up ad charlie  Intervention: Optimize Oxygenation and Ventilation  Recent Flowsheet Documentation  Taken 2/1/2025 0400 by Melissa Downing RN  Head of Bed (HOB) Positioning: HOB at  20-30 degrees  Taken 1/31/2025 2004 by Melissa Downing, RN  Head of Bed (HOB) Positioning: HOB at 20-30 degrees   Goal Outcome Evaluation:      Plan of Care Reviewed With: patient    Overall Patient Progress: improvingOverall Patient Progress: improving

## 2025-02-02 LAB
ANION GAP SERPL CALCULATED.3IONS-SCNC: 14 MMOL/L (ref 7–15)
BUN SERPL-MCNC: 34.5 MG/DL (ref 8–23)
CALCIUM SERPL-MCNC: 9.1 MG/DL (ref 8.8–10.4)
CHLORIDE SERPL-SCNC: 101 MMOL/L (ref 98–107)
CREAT SERPL-MCNC: 1.09 MG/DL (ref 0.51–0.95)
EGFRCR SERPLBLD CKD-EPI 2021: 53 ML/MIN/1.73M2
GLUCOSE SERPL-MCNC: 102 MG/DL (ref 70–99)
HCO3 SERPL-SCNC: 24 MMOL/L (ref 22–29)
MAGNESIUM SERPL-MCNC: 2.1 MG/DL (ref 1.7–2.3)
POTASSIUM SERPL-SCNC: 4 MMOL/L (ref 3.4–5.3)
SODIUM SERPL-SCNC: 139 MMOL/L (ref 135–145)

## 2025-02-02 PROCEDURE — 99232 SBSQ HOSP IP/OBS MODERATE 35: CPT | Performed by: INTERNAL MEDICINE

## 2025-02-02 PROCEDURE — 250N000013 HC RX MED GY IP 250 OP 250 PS 637: Performed by: INTERNAL MEDICINE

## 2025-02-02 PROCEDURE — 36415 COLL VENOUS BLD VENIPUNCTURE: CPT | Performed by: INTERNAL MEDICINE

## 2025-02-02 PROCEDURE — 80048 BASIC METABOLIC PNL TOTAL CA: CPT | Performed by: INTERNAL MEDICINE

## 2025-02-02 PROCEDURE — 999N000157 HC STATISTIC RCP TIME EA 10 MIN

## 2025-02-02 PROCEDURE — 250N000013 HC RX MED GY IP 250 OP 250 PS 637: Performed by: STUDENT IN AN ORGANIZED HEALTH CARE EDUCATION/TRAINING PROGRAM

## 2025-02-02 PROCEDURE — 94640 AIRWAY INHALATION TREATMENT: CPT

## 2025-02-02 PROCEDURE — 83735 ASSAY OF MAGNESIUM: CPT | Performed by: INTERNAL MEDICINE

## 2025-02-02 PROCEDURE — 250N000009 HC RX 250: Performed by: EMERGENCY MEDICINE

## 2025-02-02 PROCEDURE — 210N000002 HC R&B HEART CARE

## 2025-02-02 RX ORDER — OSELTAMIVIR PHOSPHATE 30 MG/1
30 CAPSULE ORAL 2 TIMES DAILY
Status: DISCONTINUED | OUTPATIENT
Start: 2025-02-02 | End: 2025-02-03

## 2025-02-02 RX ADMIN — LEVALBUTEROL HYDROCHLORIDE 1.25 MG: 1.25 SOLUTION RESPIRATORY (INHALATION) at 16:50

## 2025-02-02 RX ADMIN — POTASSIUM CHLORIDE 20 MEQ: 1500 TABLET, EXTENDED RELEASE ORAL at 10:52

## 2025-02-02 RX ADMIN — ACETAMINOPHEN 650 MG: 325 TABLET, FILM COATED ORAL at 15:59

## 2025-02-02 RX ADMIN — SIMVASTATIN 10 MG: 10 TABLET, FILM COATED ORAL at 22:07

## 2025-02-02 RX ADMIN — OSELTAMAVIR PHOSPHATE 30 MG: 30 CAPSULE ORAL at 22:07

## 2025-02-02 RX ADMIN — RIVAROXABAN 20 MG: 20 TABLET, FILM COATED ORAL at 10:52

## 2025-02-02 RX ADMIN — MONTELUKAST 10 MG: 10 TABLET, FILM COATED ORAL at 22:07

## 2025-02-02 RX ADMIN — OSELTAMIVIR PHOSPHATE 75 MG: 75 CAPSULE ORAL at 10:52

## 2025-02-02 RX ADMIN — METOPROLOL TARTRATE 100 MG: 100 TABLET, FILM COATED ORAL at 22:07

## 2025-02-02 RX ADMIN — FUROSEMIDE 40 MG: 40 TABLET ORAL at 10:52

## 2025-02-02 RX ADMIN — DILTIAZEM HYDROCHLORIDE 120 MG: 60 CAPSULE, EXTENDED RELEASE ORAL at 10:52

## 2025-02-02 RX ADMIN — PANTOPRAZOLE SODIUM 40 MG: 40 TABLET, DELAYED RELEASE ORAL at 10:52

## 2025-02-02 RX ADMIN — DILTIAZEM HYDROCHLORIDE 120 MG: 60 CAPSULE, EXTENDED RELEASE ORAL at 22:07

## 2025-02-02 RX ADMIN — METOPROLOL TARTRATE 100 MG: 100 TABLET, FILM COATED ORAL at 10:52

## 2025-02-02 RX ADMIN — FLUTICASONE FUROATE AND VILANTEROL TRIFENATATE 1 PUFF: 200; 25 POWDER RESPIRATORY (INHALATION) at 10:57

## 2025-02-02 ASSESSMENT — ACTIVITIES OF DAILY LIVING (ADL)
ADLS_ACUITY_SCORE: 44

## 2025-02-02 NOTE — PROGRESS NOTES
[facility]    Medicine Progress Note - Hospitalist Service    Date of Admission:  1/27/2025    Assessment & Plan   Arline Saini is a 74 year old female with history of CKD 3, hypertension, asthma admitted on 1/27/2025 with cough and fever for 3 days and was found to be in A-fib with RVR.      Acute hypoxemic respiratory failure  Influenza A  h/o Asthma  Several days of cough, fevers, body aches and dyspnea. Presented to the ED in respiratory distress from urgent care where she had a CXR performed that was negative for infiltrates. Requiring 3L of oxygen in the ED. Vital panel positive for Flu A     - Had 1 dose of steroid on admission but likely related to fluid overload so continue on IV diuresis.  - continue home inhaler regime  - continue supportive care for the flu  - continue tamiflu  Received intermittent Lasix.  Appreciate cardiology review of 01/31.  Started on Lasix 40 mg p.o. daily 02/01    CT chest reviewed: Multifocal groundglass opacities, likely inflammatory in nature. (Post diuresis)  -Dyspnea on exertion  -Will get 6-minute walk test for any desaturation.  If remains symptomatic, will need pulmonology evaluation tomorrow.       A-fib with RVR Likely related to infection.  Admission troponin of 13 but BNP 4097     - TSH normal  - TTE reviewed  -Has been on IV diltiazem and p.o. metoprolol 75 mg twice daily was started 01/28.  Heart rate still 110-120 and p.o. diltiazem added 01/29  Increased diltiazem 01/30 to 90 mg twice daily and then to 120 mg twice daily on 02/01 due to persistent heart rate in the 120-130  -Heparin gtt. transitioned to xarelto on 1/28     CKD III   - stable, monitor          Diet: Fluid restriction 2000 ML FLUID  Combination Diet Low Saturated Fat Na <2400mg Diet, No Caffeine Diet    DVT Prophylaxis: DOAC  Mendez Catheter: Not present  Lines: None     Cardiac Monitoring: ACTIVE order. Indication: Tachyarrhythmias, acute (48 hours)  Code Status: Full  "Code      Clinically Significant Risk Factors                   # Hypertension: Noted on problem list                # Asthma: noted on problem list        Social Drivers of Health    Tobacco Use: Medium Risk (1/27/2025)    Patient History     Smoking Tobacco Use: Former     Smokeless Tobacco Use: Never   Physical Activity: Inactive (6/5/2024)    Exercise Vital Sign     Days of Exercise per Week: 0 days     Minutes of Exercise per Session: 0 min   Social Connections: Unknown (6/5/2024)    Social Connection and Isolation Panel [NHANES]     Frequency of Social Gatherings with Friends and Family: Three times a week          Disposition Plan     Medically Ready for Discharge: Anticipated in 2-4 Days       Caleb Hill MD  Hospitalist Service  [facility]  Securely message with [employer] (more info)  Text page via [employer] Paging/Directory   \"This dictation was performed with voice recognition software and may contain errors,  omissions and inadvertent word substitution.\"  ______________________________________________________________________    Interval History   Last 24-hour events noted.   Complaining of dyspnea on walking around.  Denies any chest pain/palpitations  Heart rate of 120-140s but trends down after diltiazem and metoprolol.    Physical Exam   BP (!) 111/91 (BP Location: Left arm)   Pulse 97   Temp 98.1  F (36.7  C) (Oral)   Resp 18   Wt 99.7 kg (219 lb 12.8 oz)   SpO2 96%   BMI 36.18 kg/m    Gen- pleasant   Neck- supple  CVS- I+II+ no m/r/g, tachycardia 120-140 on my examination  RS- CTAB, decreased at base  Abdo- soft, no tenderness . No g/r/r  Ext-no edema       Medical Decision Making       40 MINUTES SPENT BY ME on the date of service doing chart review, history, exam, documentation & further activities per the note.  Discussion patient, her daughter, bedside RN    Data   ------------------------- PAST 24 HR DATA REVIEWED -----------------------------------------------    I " have personally reviewed the following data over the past 24 hrs:    N/A  \   N/A   / N/A     139 101 34.5 (H) /  102 (H)   4.0 24 1.09 (H) \     Procal: N/A CRP: N/A Lactic Acid: N/A         Imaging results reviewed over the past 24 hrs:   No results found for this or any previous visit (from the past 24 hours).

## 2025-02-02 NOTE — PROGRESS NOTES
6 minute walk was completed as per physician's orders.   HR was low 100s, pulse oximetry 92% to 95 % on room air, patient did not exhibit or complained of shortness of breath. Libou aid she was a bit dizzy during the walk.   Limitations: language barrier.  Patient refuses to allow me to use the  and uses instead an application on her phone.

## 2025-02-02 NOTE — PLAN OF CARE
Goal Outcome Evaluation:    Heart Failure Care Map  GOALS TO BE MET BEFORE DISCHARGE:    1. Decrease congestion and/or edema with diuretic therapy to achieve near optimal volume status.     Dyspnea improved: Yes, satisfactory for discharge.   Edema improved: No, further care required to meet this goal. Please explain +1/2 LE edema        Last 24 hour I/O:   Intake/Output Summary (Last 24 hours) at 2/2/2025 0545  Last data filed at 2/1/2025 2230  Gross per 24 hour   Intake 900 ml   Output --   Net 900 ml           Net I/O and Weights since admission:   01/03 0700 - 02/02 0659  In: 3955 [P.O.:3955]  Out: 2890 [Urine:2890]  Net: 1065     Vitals:    01/27/25 2054 01/28/25 0617 01/29/25 0640 01/30/25 0609   Weight: 105.3 kg (232 lb 3.2 oz) 104.8 kg (231 lb) 105.1 kg (231 lb 9.6 oz) 103.9 kg (229 lb)    01/31/25 0645 02/01/25 0631   Weight: 102.5 kg (226 lb) 99.7 kg (219 lb 12.8 oz)       2.  O2 sats > 90% on room air, or at prior home O2 therapy level.      Able to wean O2 this shift to keep sats above 90%?: Yes, satisfactory for discharge.   Does patient use Home O2? No          Current oxygenation status:   SpO2: 94 %     O2 Device: None (Room air),      3.  Tolerates ambulation and mobility near baseline.     Ambulation: Yes, satisfactory for discharge.   Times patient ambulated with staff this shift: 4    Please review the Heart Failure Care Map for additional HF goal outcomes.    Neuro/HEENT: AO x4, Azerbaijani speaking  Cardiac: afib cvr, denies pain, L PIV, LE edema  Resp: on room air  GI/: continent, abdomen soft and non-distended  Skin: bruising on left upper arm  Mobility: independent    Plan: outpt cardioversion post influenza recovery    Vitals:    T: 98.2 , HR: 82, BP: 133/ 98 RR: 20, O2: 94% room air      Vick Chavez, RN  2/2/2025

## 2025-02-02 NOTE — PROGRESS NOTES
"Patient is AO X 4; VSS; on RA.  [place] c/o difficulty breathing when laying down - she hears crackling and wheezing - but breathing improves when she is standing up.  She has a mild headache, \"foggy brain.\" I administered Tylenol PRN.  PRN Levalbuterol was administered by the Respiratory Therapist to alleviate the complaints of  shallow breathing/wheezing/crackles.  Subjectively, she doesn't feel well and attributes it to being hospitalized.   During our walk, her O2 was above 92% on RA; she mentioned shallow breathing but no IGLESIAS.  I was unable to elicit a straight answer if she was dizzy or not, whether she was standing or not.   "

## 2025-02-03 LAB
ALBUMIN SERPL BCG-MCNC: 3.8 G/DL (ref 3.5–5.2)
ALP SERPL-CCNC: 69 U/L (ref 40–150)
ALT SERPL W P-5'-P-CCNC: 88 U/L (ref 0–50)
ANION GAP SERPL CALCULATED.3IONS-SCNC: 15 MMOL/L (ref 7–15)
AST SERPL W P-5'-P-CCNC: 61 U/L (ref 0–45)
BILIRUB SERPL-MCNC: 1.6 MG/DL
BUN SERPL-MCNC: 37.3 MG/DL (ref 8–23)
CALCIUM SERPL-MCNC: 9.4 MG/DL (ref 8.8–10.4)
CHLORIDE SERPL-SCNC: 100 MMOL/L (ref 98–107)
CREAT SERPL-MCNC: 1.2 MG/DL (ref 0.51–0.95)
CRP SERPL-MCNC: 23.12 MG/L
EGFRCR SERPLBLD CKD-EPI 2021: 48 ML/MIN/1.73M2
ERYTHROCYTE [DISTWIDTH] IN BLOOD BY AUTOMATED COUNT: 13.6 % (ref 10–15)
GLUCOSE SERPL-MCNC: 123 MG/DL (ref 70–99)
HCO3 SERPL-SCNC: 22 MMOL/L (ref 22–29)
HCT VFR BLD AUTO: 48.7 % (ref 35–47)
HGB BLD-MCNC: 16.1 G/DL (ref 11.7–15.7)
MAGNESIUM SERPL-MCNC: 2.2 MG/DL (ref 1.7–2.3)
MCH RBC QN AUTO: 28.7 PG (ref 26.5–33)
MCHC RBC AUTO-ENTMCNC: 33.1 G/DL (ref 31.5–36.5)
MCV RBC AUTO: 87 FL (ref 78–100)
PLATELET # BLD AUTO: 215 10E3/UL (ref 150–450)
POTASSIUM SERPL-SCNC: 3.9 MMOL/L (ref 3.4–5.3)
PROT SERPL-MCNC: 7.6 G/DL (ref 6.4–8.3)
RBC # BLD AUTO: 5.61 10E6/UL (ref 3.8–5.2)
RHEUMATOID FACT SERPL-ACNC: 18 IU/ML
SODIUM SERPL-SCNC: 137 MMOL/L (ref 135–145)
WBC # BLD AUTO: 7.3 10E3/UL (ref 4–11)

## 2025-02-03 PROCEDURE — 272N000202 HC AEROBIKA WITH MANOMETER

## 2025-02-03 PROCEDURE — 99253 IP/OBS CNSLTJ NEW/EST LOW 45: CPT | Performed by: STUDENT IN AN ORGANIZED HEALTH CARE EDUCATION/TRAINING PROGRAM

## 2025-02-03 PROCEDURE — 250N000013 HC RX MED GY IP 250 OP 250 PS 637: Performed by: INTERNAL MEDICINE

## 2025-02-03 PROCEDURE — 86038 ANTINUCLEAR ANTIBODIES: CPT | Performed by: INTERNAL MEDICINE

## 2025-02-03 PROCEDURE — 250N000009 HC RX 250: Performed by: STUDENT IN AN ORGANIZED HEALTH CARE EDUCATION/TRAINING PROGRAM

## 2025-02-03 PROCEDURE — 36415 COLL VENOUS BLD VENIPUNCTURE: CPT | Performed by: INTERNAL MEDICINE

## 2025-02-03 PROCEDURE — 94640 AIRWAY INHALATION TREATMENT: CPT | Mod: 76

## 2025-02-03 PROCEDURE — 210N000002 HC R&B HEART CARE

## 2025-02-03 PROCEDURE — 86140 C-REACTIVE PROTEIN: CPT | Performed by: INTERNAL MEDICINE

## 2025-02-03 PROCEDURE — 94640 AIRWAY INHALATION TREATMENT: CPT

## 2025-02-03 PROCEDURE — 86431 RHEUMATOID FACTOR QUANT: CPT | Performed by: INTERNAL MEDICINE

## 2025-02-03 PROCEDURE — 80053 COMPREHEN METABOLIC PANEL: CPT | Performed by: INTERNAL MEDICINE

## 2025-02-03 PROCEDURE — 94799 UNLISTED PULMONARY SVC/PX: CPT

## 2025-02-03 PROCEDURE — 999N000157 HC STATISTIC RCP TIME EA 10 MIN

## 2025-02-03 PROCEDURE — 83735 ASSAY OF MAGNESIUM: CPT | Performed by: INTERNAL MEDICINE

## 2025-02-03 PROCEDURE — 99233 SBSQ HOSP IP/OBS HIGH 50: CPT | Performed by: INTERNAL MEDICINE

## 2025-02-03 PROCEDURE — 85014 HEMATOCRIT: CPT | Performed by: INTERNAL MEDICINE

## 2025-02-03 PROCEDURE — 84155 ASSAY OF PROTEIN SERUM: CPT | Performed by: INTERNAL MEDICINE

## 2025-02-03 PROCEDURE — 250N000013 HC RX MED GY IP 250 OP 250 PS 637: Performed by: STUDENT IN AN ORGANIZED HEALTH CARE EDUCATION/TRAINING PROGRAM

## 2025-02-03 PROCEDURE — 250N000012 HC RX MED GY IP 250 OP 636 PS 637: Performed by: STUDENT IN AN ORGANIZED HEALTH CARE EDUCATION/TRAINING PROGRAM

## 2025-02-03 RX ORDER — PREDNISONE 20 MG/1
20 TABLET ORAL DAILY
Status: DISCONTINUED | OUTPATIENT
Start: 2025-02-03 | End: 2025-02-04 | Stop reason: HOSPADM

## 2025-02-03 RX ORDER — LEVALBUTEROL INHALATION SOLUTION 1.25 MG/3ML
1.25 SOLUTION RESPIRATORY (INHALATION) 4 TIMES DAILY
Status: DISCONTINUED | OUTPATIENT
Start: 2025-02-03 | End: 2025-02-04 | Stop reason: HOSPADM

## 2025-02-03 RX ADMIN — PREDNISONE 20 MG: 20 TABLET ORAL at 12:04

## 2025-02-03 RX ADMIN — PANTOPRAZOLE SODIUM 40 MG: 40 TABLET, DELAYED RELEASE ORAL at 09:12

## 2025-02-03 RX ADMIN — METOPROLOL TARTRATE 100 MG: 100 TABLET, FILM COATED ORAL at 09:12

## 2025-02-03 RX ADMIN — FLUTICASONE FUROATE AND VILANTEROL TRIFENATATE 1 PUFF: 200; 25 POWDER RESPIRATORY (INHALATION) at 09:12

## 2025-02-03 RX ADMIN — LEVALBUTEROL HYDROCHLORIDE 1.25 MG: 1.25 SOLUTION RESPIRATORY (INHALATION) at 20:04

## 2025-02-03 RX ADMIN — RIVAROXABAN 20 MG: 20 TABLET, FILM COATED ORAL at 09:12

## 2025-02-03 RX ADMIN — DILTIAZEM HYDROCHLORIDE 120 MG: 60 CAPSULE, EXTENDED RELEASE ORAL at 09:12

## 2025-02-03 RX ADMIN — SIMVASTATIN 10 MG: 10 TABLET, FILM COATED ORAL at 21:40

## 2025-02-03 RX ADMIN — POTASSIUM CHLORIDE 20 MEQ: 1500 TABLET, EXTENDED RELEASE ORAL at 09:12

## 2025-02-03 RX ADMIN — METOPROLOL TARTRATE 100 MG: 100 TABLET, FILM COATED ORAL at 21:40

## 2025-02-03 RX ADMIN — OSELTAMAVIR PHOSPHATE 30 MG: 30 CAPSULE ORAL at 09:12

## 2025-02-03 RX ADMIN — LEVALBUTEROL HYDROCHLORIDE 1.25 MG: 1.25 SOLUTION RESPIRATORY (INHALATION) at 14:46

## 2025-02-03 RX ADMIN — MONTELUKAST 10 MG: 10 TABLET, FILM COATED ORAL at 21:40

## 2025-02-03 RX ADMIN — DILTIAZEM HYDROCHLORIDE 120 MG: 60 CAPSULE, EXTENDED RELEASE ORAL at 21:40

## 2025-02-03 ASSESSMENT — ACTIVITIES OF DAILY LIVING (ADL)
ADLS_ACUITY_SCORE: 44

## 2025-02-03 NOTE — PLAN OF CARE
Goal Outcome Evaluation:    Neuro/HEENT: AO x4, Filipino speaking  Cardiac: afib cvr, denies pain,  trace LE edema  Resp: on room air  GI/: continent, abdomen soft and non-distended  Skin: bruising on left upper arm  Mobility: independent     Plan: possible pulmonology consult     Vitals:  T: 97.4, HR: 65, BP: 114/77, RR: 18, O2: 98%

## 2025-02-03 NOTE — CONSULTS
[facility]    Pulmonary, Allergy, Critical Care and Sleep Medicine    Initial Consultation  02/03/2025    Arline Saini MRN# [id number]   Age: 74 year old YOB: 1950     Date of Admission: 1/27/2025  Reason for Consultation: Hospital Medicine for ground glass infiltrates     Assessment and Recommendations:    Arline Saini is a 74 year old female with a reported history of asthma, CKD3, HTN, admitted with cough and fever, positive for Influenza A and found to have Afib with RVR.     Influenza A  Acute Hypoxic Respiratory Failure (Resolved)  Ground Glass Infiltrates  On chronic therapy for asthma donald no prior PFTs to confirm. Presented with cough/fever, admitted with Afib with RVR and Influenza. Has improved with diuresis and Tamiflu. Stable on room air but with reported continued wheezing and dyspnea. Ground glass infiltrates on Chest CT 2/1/25. 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 In setting of Influenza would be difficult to rule out Influenza as cause. No obvious symptoms to raise suspicion for CTD process at this time. May still have some residual pulmonary edema though is 10+ lbs down since admission, does report supine wheezing. With report of wheezing will start airway clearance regimen and do trial of steroids in case of post-viral cough/reactive airways. Patient's daughter concerned about impact of treatments on heart rates, will use Xopenex and lower dose prednisone.     - Will start airway clearance regimen with Xopenex and flutter valve 4 times daily  - Start prednisone 20 mg daily, will assess response and determine if taper vs burst  - Would discharge on Xopenex in place of PTA Duonebs   - PCP can repeat CT in 4-6 weeks, if continued/progressive ground glass after recovered from flu and euvolemic can refer to Pulmonary for further workup  - Can refer to Pulmonary Rehab on  discharge    Tyra Estrada MD PhD   of Medicine  Pulmonary Critical Care   [facility]    For [place], Pulmonary is in house or available by page from 7 am to 6 pm Monday-Friday. For assistance at other times page the on-call pulmonologist through [facility] or the .    History of Present Illness:    History taken from patient and chart review. Patient history taken with daughter serving as  and providing much of the history.     Presented to ED 1/27 with myalgia, irregular heart beat in setting of Influenza A. Required several liters of oxygen on admission that was weaned. Diltiazem drip initially for Afib. Worsening respiratory symptoms 1/29 and received dose of steroids and nebs with ongoing IV diuresis. Improving heart rates on diltiazem, metoprolol, and diuresis. Chest CT showed ground glass opacities.     This morning patient's daughter reports she has had dyspnea and wheezing. Has had some some of these symptoms prior to hospitalization but worse since admission. Is having cough with production of mucus. No chest pain, some limitation in breathing deep. Can have wheezing when lies down. Denies reflux or sinus drainage. Denies choking.     Review of Systems:  Complete 12 point ROS negative unless mentioned in HPI    Histories, Prior to Admission Medications, Allergies:    Past Medical History:  Past Medical History:   Diagnosis Date    Essential hypertension 2/27/2019    Obesity (BMI 35.0-39.9) with comorbidity (H) 2/27/2019     Past Surgical History:  Past Surgical History:   Procedure Laterality Date    CHOLECYSTECTOMY  2018     Past Social History:  Social History     Socioeconomic History    Marital status:      Spouse name: Not on file    Number of children: Not on file    Years of education: Not on file    Highest education level: Not on file   Occupational History    Not on file   Tobacco Use    Smoking status: Former     Current  packs/day: 0.00     Types: Cigarettes     Quit date: 2018     Years since quittin.9    Smokeless tobacco: Never    Tobacco comments:     Smoked a few cigarettes every day for 40 years.   Vaping Use    Vaping status: Never Used   Substance and Sexual Activity    Alcohol use: No    Drug use: No    Sexual activity: Never   Other Topics Concern    Parent/sibling w/ CABG, MI or angioplasty before 65F 55M? Yes     Comment: Sister  of stroke at 50   Social History Narrative    Not on file     Social Drivers of Health     Financial Resource Strain: Low Risk  (2025)    Financial Resource Strain     Within the past 12 months, have you or your family members you live with been unable to get utilities (heat, electricity) when it was really needed?: No   Food Insecurity: Low Risk  (2025)    Food Insecurity     Within the past 12 months, did you worry that your food would run out before you got money to buy more?: No     Within the past 12 months, did the food you bought just not last and you didn t have money to get more?: No   Transportation Needs: Low Risk  (2025)    Transportation Needs     Within the past 12 months, has lack of transportation kept you from medical appointments, getting your medicines, non-medical meetings or appointments, work, or from getting things that you need?: No   Physical Activity: Inactive (2024)    Exercise Vital Sign     Days of Exercise per Week: 0 days     Minutes of Exercise per Session: 0 min   Stress: No Stress Concern Present (2024)    [employer] - Occupational Stress Questionnaire     Feeling of Stress : Only a little   Social Connections: Unknown (2024)    Social Connection and Isolation Panel [NHANES]     Frequency of Communication with Friends and Family: Not on file     Frequency of Social Gatherings with Friends and Family: Three times a week     Attends [employer] Services: Not on file     Active Member of Clubs or  Organizations: Not on file     Attends Club or Organization Meetings: Not on file     Marital Status: Not on file   Interpersonal Safety: Low Risk  (1/29/2025)    Interpersonal Safety     Do you feel physically and emotionally safe where you currently live?: Yes     Within the past 12 months, have you been hit, slapped, kicked or otherwise physically hurt by someone?: No     Within the past 12 months, have you been humiliated or emotionally abused in other ways by your partner or ex-partner?: No   Housing Stability: Low Risk  (1/29/2025)    Housing Stability     Do you have housing? : Yes     Are you worried about losing your housing?: No     Family History:  Family History   Problem Relation Age of Onset    Cerebrovascular Disease Mother     Hypertension Mother     Hyperlipidemia Mother     Obesity Mother     Cerebrovascular Disease Sister 40    Unknown/Adopted Father      Medications:  Current Facility-Administered Medications   Medication Dose Route Frequency Provider Last Rate Last Admin    diltiazem ER (CARDIZEM SR) 12 hr capsule 120 mg  120 mg Oral BID Stacia Dow MD, MD   120 mg at 02/02/25 2207    fluticasone-vilanterol (BREO ELLIPTA) 200-25 MCG/ACT inhaler 1 puff  1 puff Inhalation Daily Caleb Hill MD   1 puff at 02/02/25 1057    furosemide (LASIX) tablet 40 mg  40 mg Oral Daily Stacia Dow MD, MD   40 mg at 02/02/25 1052    metoprolol tartrate (LOPRESSOR) tablet 100 mg  100 mg Oral BID Pedro Light MD   100 mg at 02/02/25 2207    montelukast (SINGULAIR) tablet 10 mg  10 mg Oral At Bedtime Caleb Hill MD   10 mg at 02/02/25 2207    oseltamivir (TAMIFLU) capsule 30 mg  30 mg Oral BID Caleb Hill MD   30 mg at 02/02/25 2207    pantoprazole (PROTONIX) EC tablet 40 mg  40 mg Oral Daily Nai Cah DO   40 mg at 02/02/25 1052    potassium chloride rocio ER (KLOR-CON M20) CR tablet 20 mEq  20 mEq Oral Daily Stacia Dow MD, MD   20 mEq at 02/02/25 1052    rivaroxaban ANTICOAGULANT  (XARELTO) tablet 20 mg  20 mg Oral Daily with breakfast Pedro Light MD   20 mg at 02/02/25 1052    simvastatin (ZOCOR) tablet 10 mg  10 mg Oral At Bedtime Caleb Hill MD   10 mg at 02/02/25 2207    sodium chloride (PF) 0.9% PF flush 3 mL  3 mL Intracatheter Q8H Caleb Hill MD   3 mL at 02/02/25 2207     Current Facility-Administered Medications   Medication Dose Route Frequency Provider Last Rate Last Admin    acetaminophen (TYLENOL) tablet 650 mg  650 mg Oral Q4H PRN Caleb Hill MD   650 mg at 02/02/25 1559    Or    acetaminophen (TYLENOL) Suppository 650 mg  650 mg Rectal Q4H PRN Caleb Hill MD        calcium carbonate (TUMS) chewable tablet 1,000 mg  1,000 mg Oral 4x Daily PRN Caleb Hill MD        levalbuterol (XOPENEX) neb solution 1.25 mg  1.25 mg Nebulization Q6H PRN Debbie Murdock MD   1.25 mg at 02/02/25 1650    lidocaine (LMX4) cream   Topical Q1H PRN Caleb Hill MD        lidocaine 1 % 0.1-1 mL  0.1-1 mL Other Q1H PRN Caleb Hill MD        No anticoagulants IF patient has had acute trauma/surgery or recent intracranial, GI or urinary tract bleeding.    Other DOES NOT GO TO Caleb Lazar MD        ondansetron (ZOFRAN ODT) ODT tab 4 mg  4 mg Oral Q6H PRN Caleb Hill MD        Or    ondansetron (ZOFRAN) injection 4 mg  4 mg Intravenous Q6H PRN Caleb Hill MD        senna-docusate (SENOKOT-S/PERICOLACE) 8.6-50 MG per tablet 1 tablet  1 tablet Oral BID PRN Caleb Hill MD        Or    senna-docusate (SENOKOT-S/PERICOLACE) 8.6-50 MG per tablet 2 tablet  2 tablet Oral BID PRN Caleb Hill MD        sodium chloride (PF) 0.9% PF flush 3 mL  3 mL Intracatheter q1 min prn Christopher Hillbh, MD   3 mL at 02/01/25 1632     Allergies:   No Known Allergies  Physical Exam:    Temp:  [97.4  F (36.3  C)-98.1  F (36.7  C)] 97.4  F (36.3  C)  Pulse:  [] 65  Resp:  [16-18] 18  BP: (111-124)/(77-92) 114/77  SpO2:  [96 %-100 %] 98 %  No intake or output data in the 24  hours ending 02/03/25 0633     General: Sitting up at side of bed in NAD  HEENT: anicteric, moist mucosa  Neck: no palpable lymphadenopathy  Chest: No significant wheezing, moving air, crackles bases  Cardiac: Tachycardic, no murmurs  Abdomen: Soft, flat, non tender, active BS  Extremities: No LE Edema  Neuro: A&Ox3, no focal deficits   Skin: no rash noted    Laboratory, imaging, and microbiologic data:    All laboratory and imaging data reviewed, pertinent results discussed above.

## 2025-02-03 NOTE — PROGRESS NOTES
Pt is on RA with SpO2 in the mid to upper 90's. BS Diminished. Scheduled nebs with flutter valve treatments started this afternoon per MD orders. Pt tolerated the treatments well.  Will cont to follow.  2/3/2025  Olivia Owusu, RT

## 2025-02-03 NOTE — PROGRESS NOTES
CLINICAL NUTRITION SERVICES    Reviewed nutrition risk factors due to LOS. No concerns with oral intake per flowsheets / RN report / [employer] (room service meal ordering system). Intakes documented: 1/28: 50/50/75, 1/31: 100. Patient has received a total of 7 meals throughout her hospital stay per [employer]. RD spoke with RN who indicated patient is receiving meals from home provided by her daughter; denies food from hospital when offered. No indication of pressure injury. Trace 1+ BLE edema.     Weight History: Weight loss d/t diuresis - noted to be fluid overloaded    Wt Readings from Last 10 Encounters:   02/03/25 99.3 kg (219 lb)   01/27/25 108.1 kg (238 lb 6.4 oz)   10/22/24 111.9 kg (246 lb 11.2 oz)   06/06/24 106.1 kg (234 lb)   11/22/23 107 kg (236 lb)   11/07/23 107 kg (236 lb)   10/26/23 103.9 kg (229 lb)   05/31/23 105.7 kg (233 lb)   03/18/22 106.9 kg (235 lb 11.2 oz)   02/03/22 104.3 kg (230 lb)     Date/Time Weight Weight Method   02/03/25 0600 99.3 kg (219 lb) Standing scale   02/01/25 0631 99.7 kg (219 lb 12.8 oz) Standing scale   01/31/25 0645 102.5 kg (226 lb) Standing scale   01/30/25 0609 103.9 kg (229 lb) Standing scale   01/29/25 0640 105.1 kg (231 lb 9.6 oz) Standing scale   01/28/25 0617 104.8 kg (231 lb) Standing scale   01/27/25 2054 105.3 kg (232 lb 3.2 oz) --     Follow Up / Monitoring:   Per policy unless consulted.    Marni Florez RD, LD  Clinical Dietitian - [facility]

## 2025-02-03 NOTE — PROGRESS NOTES
[facility]    Medicine Progress Note - Hospitalist Service    Date of Admission:  1/27/2025    Assessment & Plan   Arline Saini is a 74 year old female with history of CKD 3, hypertension, asthma admitted on 1/27/2025 with cough and fever for 3 days and was found to be in A-fib with RVR.      Acute hypoxemic respiratory failure  Influenza A  h/o Asthma  Several days of cough, fevers, body aches and dyspnea. Presented to the ED in respiratory distress from urgent care where she had a CXR performed that was negative for infiltrates. Requiring 3L of oxygen in the ED. Vital panel positive for Flu A     - Had 1 dose of steroid on admission but likely related to fluid overload so continue on IV diuresis.  - continue home inhaler regime  - continue supportive care for the flu  - continue tamiflu  Received intermittent Lasix.  Appreciate cardiology review of 01/31.  Started on Lasix 40 mg p.o. daily 02/01.  Will hold 02/03 due to increasing creatinine    CT chest reviewed: Multifocal groundglass opacities, likely inflammatory in nature. (Post diuresis)  -Dyspnea on exertion.  Discussed with pulmonology 02/03, will start on low-dose steroids, Xopenex, Acapella and evaluate response.  Likely will need repeat imaging in 4 to 6 weeks and then decide any need for further workup    A-fib with RVR Likely related to infection.  Admission troponin of 13 but BNP 4097     - TSH normal  - TTE reviewed  -Has been on IV diltiazem and p.o. metoprolol 75 mg twice daily was started 01/28.  Heart rate still 110-120 and p.o. diltiazem added 01/29  Increased diltiazem 01/30 to 90 mg twice daily and then to 120 mg twice daily on 02/01 due to persistent heart rate in the 120-130  -Heparin gtt. transitioned to xarelto on 1/28     CKD III   - stable, monitor          Diet: Fluid restriction 2000 ML FLUID  Combination Diet Low Saturated Fat Na <2400mg Diet, No Caffeine Diet    DVT Prophylaxis: DOAC  Mendez Catheter:  "Not present  Lines: None     Cardiac Monitoring: ACTIVE order. Indication: Tachyarrhythmias, acute (48 hours)  Code Status: Full Code      Clinically Significant Risk Factors                   # Hypertension: Noted on problem list                # Asthma: noted on problem list        Social Drivers of Health    Tobacco Use: Medium Risk (1/27/2025)    Patient History     Smoking Tobacco Use: Former     Smokeless Tobacco Use: Never   Physical Activity: Inactive (6/5/2024)    Exercise Vital Sign     Days of Exercise per Week: 0 days     Minutes of Exercise per Session: 0 min   Social Connections: Unknown (6/5/2024)    Social Connection and Isolation Panel [NHANES]     Frequency of Social Gatherings with Friends and Family: Three times a week          Disposition Plan     Medically Ready for Discharge: Anticipated Tomorrow if creatinine stable/better and breathing improved       Caleb Hill MD  Hospitalist Service  [facility]  Securely message with Ping4 (more info)  Text page via [employer] Paging/Directory   \"This dictation was performed with voice recognition software and may contain errors,  omissions and inadvertent word substitution.\"  ______________________________________________________________________    Interval History   Last 24-hour events noted.   Had some dizziness and feeling foggy after walking 6 minutes with the RN.  Along with wheezing  This morning denies any chest pain/palpitations.  Feels okay on setting    Physical Exam   /77   Pulse 96   Temp 97.4  F (36.3  C) (Oral)   Resp 16   Wt 99.3 kg (219 lb)   SpO2 92%   BMI 36.05 kg/m    Gen- pleasant   Neck- supple  CVS- I+II+ no m/r/g, tachycardia 120-140 on my examination  RS- CTAB, decreased at base with some crackles  Abdo- soft, no tenderness . No g/r/r  Ext-no edema       Medical Decision Making       51 MINUTES SPENT BY ME on the date of service doing chart review, history, exam, documentation & further " activities per the note.  Discussion patient, her daughter, bedside RN, pulmonology service    Data   ------------------------- PAST 24 HR DATA REVIEWED -----------------------------------------------    I have personally reviewed the following data over the past 24 hrs:    7.3  \   16.1 (H)   / 215     137 100 37.3 (H) /  123 (H)   3.9 22 1.20 (H) \     ALT: 88 (H) AST: 61 (H) AP: 69 TBILI: 1.6 (H)   ALB: 3.8 TOT PROTEIN: 7.6 LIPASE: N/A     Procal: N/A CRP: 23.12 (H) Lactic Acid: N/A         Imaging results reviewed over the past 24 hrs:   No results found for this or any previous visit (from the past 24 hours).

## 2025-02-04 ENCOUNTER — TELEPHONE (OUTPATIENT)
Dept: FAMILY MEDICINE | Facility: CLINIC | Age: 75
End: 2025-02-04
Payer: COMMERCIAL

## 2025-02-04 ENCOUNTER — NURSE TRIAGE (OUTPATIENT)
Dept: NURSING | Facility: CLINIC | Age: 75
End: 2025-02-04
Payer: COMMERCIAL

## 2025-02-04 ENCOUNTER — TELEPHONE (OUTPATIENT)
Dept: PHARMACY | Facility: HOSPITAL | Age: 75
End: 2025-02-04
Payer: COMMERCIAL

## 2025-02-04 VITALS
DIASTOLIC BLOOD PRESSURE: 95 MMHG | OXYGEN SATURATION: 100 % | SYSTOLIC BLOOD PRESSURE: 111 MMHG | RESPIRATION RATE: 18 BRPM | BODY MASS INDEX: 35.84 KG/M2 | TEMPERATURE: 97.3 F | WEIGHT: 217.7 LBS | HEART RATE: 71 BPM

## 2025-02-04 DIAGNOSIS — J45.40 MODERATE PERSISTENT ASTHMA WITHOUT COMPLICATION: ICD-10-CM

## 2025-02-04 DIAGNOSIS — Z00.00 ROUTINE GENERAL MEDICAL EXAMINATION AT A HEALTH CARE FACILITY: ICD-10-CM

## 2025-02-04 DIAGNOSIS — E78.5 DYSLIPIDEMIA: ICD-10-CM

## 2025-02-04 LAB
ANA SER QL IF: NEGATIVE
ANION GAP SERPL CALCULATED.3IONS-SCNC: 10 MMOL/L (ref 7–15)
BUN SERPL-MCNC: 31.9 MG/DL (ref 8–23)
CALCIUM SERPL-MCNC: 9.1 MG/DL (ref 8.8–10.4)
CHLORIDE SERPL-SCNC: 102 MMOL/L (ref 98–107)
CREAT SERPL-MCNC: 1.01 MG/DL (ref 0.51–0.95)
EGFRCR SERPLBLD CKD-EPI 2021: 58 ML/MIN/1.73M2
GLUCOSE SERPL-MCNC: 149 MG/DL (ref 70–99)
HCO3 SERPL-SCNC: 25 MMOL/L (ref 22–29)
MAGNESIUM SERPL-MCNC: 2.1 MG/DL (ref 1.7–2.3)
POTASSIUM SERPL-SCNC: 4.3 MMOL/L (ref 3.4–5.3)
SODIUM SERPL-SCNC: 137 MMOL/L (ref 135–145)

## 2025-02-04 PROCEDURE — 82310 ASSAY OF CALCIUM: CPT | Performed by: INTERNAL MEDICINE

## 2025-02-04 PROCEDURE — 250N000013 HC RX MED GY IP 250 OP 250 PS 637: Performed by: INTERNAL MEDICINE

## 2025-02-04 PROCEDURE — 94640 AIRWAY INHALATION TREATMENT: CPT | Mod: 76

## 2025-02-04 PROCEDURE — 94640 AIRWAY INHALATION TREATMENT: CPT

## 2025-02-04 PROCEDURE — 36415 COLL VENOUS BLD VENIPUNCTURE: CPT | Performed by: INTERNAL MEDICINE

## 2025-02-04 PROCEDURE — 94799 UNLISTED PULMONARY SVC/PX: CPT

## 2025-02-04 PROCEDURE — 83735 ASSAY OF MAGNESIUM: CPT | Performed by: HOSPITALIST

## 2025-02-04 PROCEDURE — 250N000012 HC RX MED GY IP 250 OP 636 PS 637: Performed by: STUDENT IN AN ORGANIZED HEALTH CARE EDUCATION/TRAINING PROGRAM

## 2025-02-04 PROCEDURE — 99231 SBSQ HOSP IP/OBS SF/LOW 25: CPT | Performed by: STUDENT IN AN ORGANIZED HEALTH CARE EDUCATION/TRAINING PROGRAM

## 2025-02-04 PROCEDURE — 250N000009 HC RX 250: Performed by: STUDENT IN AN ORGANIZED HEALTH CARE EDUCATION/TRAINING PROGRAM

## 2025-02-04 PROCEDURE — 250N000013 HC RX MED GY IP 250 OP 250 PS 637: Performed by: STUDENT IN AN ORGANIZED HEALTH CARE EDUCATION/TRAINING PROGRAM

## 2025-02-04 PROCEDURE — 82374 ASSAY BLOOD CARBON DIOXIDE: CPT | Performed by: INTERNAL MEDICINE

## 2025-02-04 PROCEDURE — 80048 BASIC METABOLIC PNL TOTAL CA: CPT | Performed by: INTERNAL MEDICINE

## 2025-02-04 PROCEDURE — 250N000013 HC RX MED GY IP 250 OP 250 PS 637: Performed by: HOSPITALIST

## 2025-02-04 PROCEDURE — 999N000157 HC STATISTIC RCP TIME EA 10 MIN

## 2025-02-04 PROCEDURE — 99239 HOSP IP/OBS DSCHRG MGMT >30: CPT | Performed by: HOSPITALIST

## 2025-02-04 RX ORDER — FLUTICASONE PROPIONATE AND SALMETEROL 500; 50 UG/1; UG/1
1 POWDER RESPIRATORY (INHALATION) EVERY 12 HOURS
Qty: 180 EACH | Refills: 0 | Status: SHIPPED | OUTPATIENT
Start: 2025-02-04 | End: 2025-02-06

## 2025-02-04 RX ORDER — DILTIAZEM HYDROCHLORIDE 120 MG/1
120 CAPSULE, EXTENDED RELEASE ORAL 2 TIMES DAILY
Qty: 60 CAPSULE | Refills: 0 | Status: SHIPPED | OUTPATIENT
Start: 2025-02-04 | End: 2025-02-04

## 2025-02-04 RX ORDER — POTASSIUM CHLORIDE 750 MG/1
10 TABLET, EXTENDED RELEASE ORAL DAILY
Qty: 30 TABLET | Refills: 0 | Status: SHIPPED | OUTPATIENT
Start: 2025-02-05

## 2025-02-04 RX ORDER — FUROSEMIDE 20 MG/1
20 TABLET ORAL DAILY
Qty: 30 TABLET | Refills: 0 | Status: SHIPPED | OUTPATIENT
Start: 2025-02-04

## 2025-02-04 RX ORDER — DILTIAZEM HYDROCHLORIDE 240 MG/1
240 CAPSULE, COATED, EXTENDED RELEASE ORAL DAILY
Qty: 30 CAPSULE | Refills: 0 | Status: SHIPPED | OUTPATIENT
Start: 2025-02-04

## 2025-02-04 RX ORDER — MONTELUKAST SODIUM 10 MG/1
1 TABLET ORAL AT BEDTIME
Qty: 30 TABLET | Refills: 0 | Status: SHIPPED | OUTPATIENT
Start: 2025-02-04 | End: 2025-02-06

## 2025-02-04 RX ORDER — LEVALBUTEROL INHALATION SOLUTION 1.25 MG/3ML
1.25 SOLUTION RESPIRATORY (INHALATION) 2 TIMES DAILY
Qty: 180 ML | Refills: 0 | Status: SHIPPED | OUTPATIENT
Start: 2025-02-04 | End: 2025-02-05

## 2025-02-04 RX ORDER — LEVALBUTEROL INHALATION SOLUTION 1.25 MG/3ML
1.25 SOLUTION RESPIRATORY (INHALATION) EVERY 6 HOURS PRN
Qty: 90 ML | Refills: 0 | Status: SHIPPED | OUTPATIENT
Start: 2025-02-04 | End: 2025-02-05

## 2025-02-04 RX ORDER — FUROSEMIDE 20 MG/1
20 TABLET ORAL DAILY
Status: DISCONTINUED | OUTPATIENT
Start: 2025-02-04 | End: 2025-02-04 | Stop reason: HOSPADM

## 2025-02-04 RX ORDER — PREDNISONE 20 MG/1
20 TABLET ORAL DAILY
Qty: 4 TABLET | Refills: 0 | Status: SHIPPED | OUTPATIENT
Start: 2025-02-05 | End: 2025-02-09

## 2025-02-04 RX ORDER — METOPROLOL TARTRATE 100 MG/1
100 TABLET ORAL 2 TIMES DAILY
Qty: 60 TABLET | Refills: 0 | Status: SHIPPED | OUTPATIENT
Start: 2025-02-04

## 2025-02-04 RX ADMIN — FLUTICASONE FUROATE AND VILANTEROL TRIFENATATE 1 PUFF: 200; 25 POWDER RESPIRATORY (INHALATION) at 08:48

## 2025-02-04 RX ADMIN — LEVALBUTEROL HYDROCHLORIDE 1.25 MG: 1.25 SOLUTION RESPIRATORY (INHALATION) at 11:56

## 2025-02-04 RX ADMIN — LEVALBUTEROL HYDROCHLORIDE 1.25 MG: 1.25 SOLUTION RESPIRATORY (INHALATION) at 07:59

## 2025-02-04 RX ADMIN — PREDNISONE 20 MG: 20 TABLET ORAL at 08:45

## 2025-02-04 RX ADMIN — FUROSEMIDE 20 MG: 20 TABLET ORAL at 11:49

## 2025-02-04 RX ADMIN — POTASSIUM CHLORIDE 20 MEQ: 1500 TABLET, EXTENDED RELEASE ORAL at 08:45

## 2025-02-04 RX ADMIN — RIVAROXABAN 20 MG: 20 TABLET, FILM COATED ORAL at 08:45

## 2025-02-04 RX ADMIN — DILTIAZEM HYDROCHLORIDE 120 MG: 60 CAPSULE, EXTENDED RELEASE ORAL at 08:44

## 2025-02-04 RX ADMIN — METOPROLOL TARTRATE 100 MG: 100 TABLET, FILM COATED ORAL at 08:45

## 2025-02-04 RX ADMIN — PANTOPRAZOLE SODIUM 40 MG: 40 TABLET, DELAYED RELEASE ORAL at 08:45

## 2025-02-04 ASSESSMENT — ACTIVITIES OF DAILY LIVING (ADL)
ADLS_ACUITY_SCORE: 44

## 2025-02-04 NOTE — PROGRESS NOTES
[facility]    Pulmonary, Allergy, Critical Care and Sleep Medicine    Progress Note   02/04/2025    Arline Saini MRN# [id number]   Age: 74 year old YOB: 1950     Date of Admission: 1/27/2025    Assessment and Recommendations:    Arline Saini is a 74 year old female with a reported history of asthma, CKD3, HTN, admitted with cough and fever, positive for Influenza A and found to have Afib with RVR.     Influenza A  Acute Hypoxic Respiratory Failure (Resolved)  Ground Glass Infiltrates  On chronic therapy for asthma, no prior PFTs to confirm. Presented with cough/fever, admitted with Afib with RVR and Influenza. Has improved with diuresis and Tamiflu. Stable on room air but with reported continued wheezing and dyspnea. Ground glass infiltrates on Chest CT 2/1/25. In setting of Influenza would be difficult to rule out Influenza as cause. No obvious symptoms to raise suspicion for CTD process at this time. Possible has some residual pulmonary edema though euvolemic on exam and weight down over 10 lbs since admission.     Started on scheduled bronchodilators and lower dose prednisone yesterday. Today feeling better and ok to discharge from Pulmonary standpoint. Reviewed plan for prednisone burst, continued nebs, and follow up CT after discharge.     - Continue Xopenex BID at discharge with flutter valve, discontinue PTA Duonebs  - Can resume PTA Advair  - Would complete 5 day prednisone burst at 20 mg  - PCP can repeat CT in 4-6 weeks, if continued/progressive ground glass after recovered from flu and euvolemic can refer to Pulmonary for further workup  - Can refer to Pulmonary Rehab on discharge    Pulmonary will sign off    Tyra Estrada MD PhD   of Medicine  Pulmonary Critical Care   [facility]    For [place], Pulmonary is in house or available by page from 7 am to 6 pm Monday-Friday. For assistance at other times page  the on-call pulmonologist through [facility] or the .    Interval History:    Patient history taken with daughter serving as  and providing much of the history.     Breathing is doing better. Tolerating the nebs and steroids well. No new complaints.     Review of Systems:  Complete 12 point ROS negative unless mentioned in HPI    Medications, Allergies:    Medications:  Current Facility-Administered Medications   Medication Dose Route Frequency Provider Last Rate Last Admin    diltiazem ER (CARDIZEM SR) 12 hr capsule 120 mg  120 mg Oral BID Stacia Dow MD, MD   120 mg at 02/04/25 0844    fluticasone-vilanterol (BREO ELLIPTA) 200-25 MCG/ACT inhaler 1 puff  1 puff Inhalation Daily Caleb Hill MD   1 puff at 02/04/25 0848    [Held by provider] furosemide (LASIX) tablet 40 mg  40 mg Oral Daily Stacia Dow MD, MD   40 mg at 02/02/25 1052    levalbuterol (XOPENEX) neb solution 1.25 mg  1.25 mg Nebulization 4x Daily Tyra Estrada MD   1.25 mg at 02/04/25 0759    metoprolol tartrate (LOPRESSOR) tablet 100 mg  100 mg Oral BID Pedro Light MD   100 mg at 02/04/25 0845    montelukast (SINGULAIR) tablet 10 mg  10 mg Oral At Bedtime Caleb Hill MD   10 mg at 02/03/25 2140    pantoprazole (PROTONIX) EC tablet 40 mg  40 mg Oral Daily Nai Cha DO   40 mg at 02/04/25 0845    potassium chloride rocio ER (KLOR-CON M20) CR tablet 20 mEq  20 mEq Oral Daily Stacia Dow MD, MD   20 mEq at 02/04/25 0845    predniSONE (DELTASONE) tablet 20 mg  20 mg Oral Daily Tyra Estrada MD   20 mg at 02/04/25 0845    rivaroxaban ANTICOAGULANT (XARELTO) tablet 20 mg  20 mg Oral Daily with breakfast Pedro Light MD   20 mg at 02/04/25 0845    simvastatin (ZOCOR) tablet 10 mg  10 mg Oral At Bedtime Caleb Hill MD   10 mg at 02/03/25 2140    sodium chloride (PF) 0.9% PF flush 3 mL  3 mL Intracatheter Q8H Caleb Hill MD   3 mL at 02/04/25 0656     Current Facility-Administered  Medications   Medication Dose Route Frequency Provider Last Rate Last Admin    acetaminophen (TYLENOL) tablet 650 mg  650 mg Oral Q4H PRN Caleb Hill MD   650 mg at 02/02/25 1559    Or    acetaminophen (TYLENOL) Suppository 650 mg  650 mg Rectal Q4H PRN Caleb Hill MD        calcium carbonate (TUMS) chewable tablet 1,000 mg  1,000 mg Oral 4x Daily PRN Caleb Hill MD        levalbuterol (XOPENEX) neb solution 1.25 mg  1.25 mg Nebulization Q6H PRN Debbie Murdock MD   1.25 mg at 02/02/25 1650    lidocaine (LMX4) cream   Topical Q1H PRN Caleb Hill MD        lidocaine 1 % 0.1-1 mL  0.1-1 mL Other Q1H PRN Caleb Hill MD        No anticoagulants IF patient has had acute trauma/surgery or recent intracranial, GI or urinary tract bleeding.    Other DOES NOT GO TO Caleb Lazar MD        ondansetron (ZOFRAN ODT) ODT tab 4 mg  4 mg Oral Q6H PRN Caleb Hill MD        Or    ondansetron (ZOFRAN) injection 4 mg  4 mg Intravenous Q6H PRN Caleb Hill MD        senna-docusate (SENOKOT-S/PERICOLACE) 8.6-50 MG per tablet 1 tablet  1 tablet Oral BID PRN Caleb Hill MD        Or    senna-docusate (SENOKOT-S/PERICOLACE) 8.6-50 MG per tablet 2 tablet  2 tablet Oral BID PRN Caleb Hill MD        sodium chloride (PF) 0.9% PF flush 3 mL  3 mL Intracatheter q1 min prn Caleb Hill MD   3 mL at 02/01/25 1632     Allergies:   No Known Allergies  Physical Exam:    Temp:  [97.3  F (36.3  C)-98.6  F (37  C)] 97.3  F (36.3  C)  Pulse:  [] 71  Resp:  [16-20] 18  BP: (105-129)/(77-99) 111/95  SpO2:  [92 %-100 %] 96 %    Intake/Output Summary (Last 24 hours) at 2/4/2025 1021  Last data filed at 2/4/2025 0600  Gross per 24 hour   Intake 240 ml   Output --   Net 240 ml       General: Sitting up at side of bed in NAD  HEENT: anicteric, moist mucosa  Neck: no palpable lymphadenopathy  Chest: Clear breath sounds bilaterally, no wheezing  Cardiac: Tachycardic, no murmurs  Abdomen: Soft, flat, non tender,  active BS  Extremities: No LE Edema  Neuro: A&Ox3, no focal deficits   Skin: no rash noted    Laboratory, imaging, and microbiologic data:    All laboratory and imaging data reviewed, pertinent results discussed above.

## 2025-02-04 NOTE — PLAN OF CARE
Goal Outcome Evaluation:      Plan of Care Reviewed With: patient, child  Patient alert, steady with ind ambulation. Pt eats food from home. Continues to have crackles at bases, Lasix on hold today due to creat rise. Xopenex nebs, start prednisone, chest physiotherapy ordered. Sat mid 90's on RA. Afib, increasing rate this afternoon 120's. Asymptomatic. Possible discharge tomorrow

## 2025-02-04 NOTE — DISCHARGE SUMMARY
[facility]  Hospitalist Discharge Summary      Date of Admission:  1/27/2025  Date of Discharge:  2/4/2025  Discharging Provider: Feroz Urbano MD  Discharge Service: Hospitalist Service    Discharge Diagnoses     Please refer to the hospital course below    Clinically Significant Risk Factors          Follow-ups Needed After Discharge   Follow-up Appointments       Hospital Follow-up with Existing Primary Care Provider (PCP)      Please see details below         Schedule Primary Care visit within: 14 Days   Recommended labs and Imaging (to be ordered by Primary Care Provider): H&H and CMP at follow up. Needs repeat CT chest in 4-6 weeks.                Unresulted Labs Ordered in the Past 30 Days of this Admission       No orders found from 12/28/2024 to 1/28/2025.        These results will be followed up by none    Discharge Disposition   Discharged to home  Condition at discharge: Stable    Hospital Course     Arline Saini is a 74 year old female with history of CKD 3, hypertension, asthma admitted on 1/27/2025 with cough and fever for 3 days and was found to be in A-fib with RVR.      Acute hypoxemic respiratory failure  Influenza A  h/o Asthma  Several days of cough, fevers, body aches and dyspnea. Presented to the ED in respiratory distress from urgent care where she had a CXR performed that was negative for infiltrates. Requiring 3L of oxygen in the ED. Vital panel positive for Flu A     - Treated with Tamiflu, completed course but likely related to fluid overload so steroid discontinued and subsequently treated with IV diuresis, see below.  - continued home inhaler regimen.  Developed RVR, see below.  PTA albuterol discontinued and changed to Xopenex.  - Ongoing dyspnea and wheezing.  Pulmonary consulted,  PO steroid resumed. Dyspnea has significantly improved, on room air.  Discharging on short course of p.o. prednisone, and      CT chest reviewed: Multifocal groundglass  opacities, likely inflammatory in nature. (Post diuresis)  -Dyspnea on exertion.  Discussed with pulmonology 02/03, will start on low-dose steroids, Xopenex, Acapella and evaluate response.  Likely will need repeat imaging in 4 to 6 weeks and then decide any need for further workup     A-fib with RVR Likely related to infection.  Admission troponin of 13 but BNP 4097  - TSH normal.  TTE shows normal LVEF, moderate biatrial enlargement.  -Needed IV diltiazem, metoprolol p.o. was started and titrated.  Diltiazem as well added p.o.  Rate has been fairly controlled now.  Was up to 120s this a.m. but down to 70s-80s after getting morning medications.  Discharging on p.o. metoprolol and diltiazem CD.  -Heparin gtt. was initiated, transitioned to xarelto on 1/28  -Evaluated by cardiology, IV Lasix on admission, subsequently changed to p.o.  Appears euvolemic.  Discharging on p.o. Lasix with potassium supplement.  -Outpatient follow-up with cardiology has been scheduled.  Plan is cardioversion as outpatient.      CKD III   - stable, monitor    Consultations This Hospital Stay   PHYSICAL THERAPY ADULT IP CONSULT  CARDIOLOGY IP CONSULT  PHARMACY IP CONSULT  PULMONARY IP CONSULT    Code Status   Full Code    Time Spent on this Encounter   I, Feroz Urbano MD, personally saw the patient today and spent greater than 30 minutes discharging this patient.       Feroz Urbano MD  [facility]ART [facility]  [address] [place], [place] [address], [address]  [facility] [address]  Phone: [phone]  ______________________________________________________________________    Physical Exam   Vital Signs: Temp: 97.3  F (36.3  C) Temp src: Oral BP: (!) 111/95 Pulse: 71   Resp: 18 SpO2: 100 % O2 Device: None (Room air)    Weight: 217 lbs 11.2 oz    General: AAOx3, up in bed, appears comfortable.  HEENT: PERRLA EOMI. Mucosa moist.   Lungs: Bilateral equal air entry. Dim but Clear to auscultation, normal work of breathing.   CVS: S1S2  irregular, mildly tachycardic during exam, resolved subsequently, no murmur.   Abdomen: Soft, NT, ND. BS heard.  MSK: No edema or deformities.  Neuro: AAOX3. CN 2-12 normal. Strength symmetrical.  Skin: No rash.          Primary Care Physician   [facility]    Discharge Orders      Follow-Up with Cardiology EP      Follow-Up with Cardiology PRICILLA      Reason for your hospital stay    Atrial fibrillation with rapid heart rate  Asthma with exacerbation  Possible acute CHF     Activity    Your activity upon discharge: activity as tolerated     Diet    Follow this diet upon discharge: Current Diet:Orders Placed This Encounter      Fluid restriction 2000 ML FLUID      Combination Diet Low Saturated Fat Na <2400mg Diet, No Caffeine Diet     Hospital Follow-up with Existing Primary Care Provider (PCP)    Please see details below            Significant Results and Procedures   Most Recent 3 CBC's:  Recent Labs   Lab Test 02/03/25  0653 01/29/25  0624 01/28/25  1801   WBC 7.3 7.9 6.9   HGB 16.1* 13.5 13.0   MCV 87 90 89    166 167     Most Recent 3 BMP's:  Recent Labs   Lab Test 02/04/25  0641 02/03/25  0653 02/02/25  0922    137 139   POTASSIUM 4.3 3.9 4.0   CHLORIDE 102 100 101   CO2 25 22 24   BUN 31.9* 37.3* 34.5*   CR 1.01* 1.20* 1.09*   ANIONGAP 10 15 14   YOANDY 9.1 9.4 9.1   * 123* 102*     Most Recent 2 LFT's:  Recent Labs   Lab Test 02/03/25  0653 06/06/24  1035   AST 61* 25   ALT 88* 21   ALKPHOS 69 78   BILITOTAL 1.6* 1.1     Most Recent 3 Troponin's:No lab results found.  Most Recent 3 BNP's:  Recent Labs   Lab Test 01/31/25  0640 01/27/25  1429 05/31/23  0935 09/15/20  1104   NTBNPI 1,000* 4,097*  --   --    NTBNP  --   --  421 238*     Most Recent Cholesterol Panel:  Recent Labs   Lab Test 06/06/24  1035   CHOL 198   *   HDL 67   TRIG 88     7-Day Micro Results       No results found for the last 168 hours.          Most Recent TSH and T4:  Recent Labs   Lab Test  01/27/25  2118   TSH 0.70     Most Recent Hemoglobin A1c:  Recent Labs   Lab Test 01/05/21  0846   A1C 5.7*     Most Recent 6 glucoses:  Recent Labs   Lab Test 02/04/25  0641 02/03/25  0653 02/02/25  0922 02/01/25  0801 01/31/25  0640 01/30/25  0718   * 123* 102* 90 102* 141*   ,   Results for orders placed or performed during the hospital encounter of 01/27/25   XR Chest 2 Views    Narrative    EXAM: XR CHEST 2 VIEWS  LOCATION: [facility]  DATE: 1/31/2025    INDICATION: persistent cough . r o pneumonia  COMPARISON: 01/27/2025      Impression    IMPRESSION: Negative chest.   CT Chest w/o Contrast    Narrative    EXAM: CT CHEST WITHOUT CONTRAST  LOCATION: [facility]  DATE: 02/01/2025    INDICATION: Persistent crackles.  COMPARISON: Chest film 01/31/2025.  TECHNIQUE: CT chest without IV contrast. Multiplanar reformats were obtained. Dose reduction techniques were used.  CONTRAST: None.    FINDINGS:   LUNGS AND PLEURA: Multifocal groundglass airspace opacities, greatest in the mid and lower lungs, likely inflammatory in nature. No pleural fluid.    MEDIASTINUM/AXILLAE: Cardiac enlargement. Trace pericardial fluid. No lymphadenopathy. Dilatation of the ascending thoracic aorta measures 4.3 cm on the sagittal view.    CORONARY ARTERY CALCIFICATION: Mild.    UPPER ABDOMEN: Cholecystectomy. Tiny bilateral nonobstructing stones within the kidneys measuring under 2 mm in size. Cortical cyst left kidney requires no specific follow-up. Parapelvic right renal cysts require no specific follow-up.    MUSCULOSKELETAL: Normal.      Impression    IMPRESSION:   1.  Multifocal groundglass opacities within the lungs, greatest in the lower lobes. These are likely inflammatory in nature.  2.  Cardiac enlargement.  3.  Tiny pericardial effusion.  4.  Dilatation of the ascending thoracic aorta measuring up to 4.3 cm based on the sagittal views.  5.  Small nonobstructing stones  within the kidneys.     Echocardiogram Complete     Value    LVEF  50-55%    Narrative    [id number]  [id number]  [id number]  [id number]^HARPAL^GENOVEVA     [facility]  Echocardiography Laboratory  [address] [place] [address]     Name: SARA MCKEON  MRN: [id number]  : 1950  Study Date: 2025 02:53 PM  Age: 74 yrs  Gender: Female  Patient Location: [facility]  Reason For Study: Atrial Fibrillation  Ordering Physician: GENOVEVA ROWAN  Referring Physician: Haley Talmage Elise Fergus  Performed By: Cleo Pereira RDCS     BSA: 2.1 m2  Height: 65 in  Weight: 231 lb  HR: 103  BP: 115/88 mmHg  ______________________________________________________________________________  Procedure  Echocardiogram with two-dimensional, color and spectral Doppler. Definity (NDC  #[id number]) given intravenously.  ______________________________________________________________________________  Interpretation Summary     Left ventricular systolic function is normal.  The visual ejection fraction is 50-55%.  Normal right ventricular size and systolic function.  Moderate biatrial enlargement.  Mild mitral valve regurgitation.  Dilated inferior vena cava.  Stable ascending aorta dilatation of 4.0 cm.  Rhythm is atrial fibrillation.     ______________________________________________________________________________  Left Ventricle  The left ventricle is normal in size. There is normal left ventricular wall  thickness. Left ventricular systolic function is normal. The visual ejection  fraction is 50-55%. Diastolic function not assessed due to atrial  fibrillation. No regional wall motion abnormalities noted.     Right Ventricle  The right ventricle is normal in size and function.     Atria  There is moderate biatrial enlargement. There is no atrial shunt seen.     Mitral Valve  The mitral valve leaflets appear normal. There is no evidence of stenosis,  fluttering, or prolapse. There is mild (1+) mitral  regurgitation. There is no  mitral valve stenosis.     Tricuspid Valve  Normal tricuspid valve. There is trace tricuspid regurgitation. The right  ventricular systolic pressure is approximated at 18.5 mmHg plus the right  atrial pressure.     Aortic Valve  The aortic valve is trileaflet with aortic valve sclerosis. The aortic valve  is not well visualized. There is trace aortic regurgitation. No aortic  stenosis is present.     Pulmonic Valve  There is trace pulmonic valvular regurgitation.     Vessels  The aortic root is normal size. Ascending aorta dilatation is present. 4.0 cm.  Dilation of the inferior vena cava is present with abnormal respiratory  variation in diameter.     Pericardium  There is no pericardial effusion.     Rhythm  The rhythm was atrial fibrillation.  ______________________________________________________________________________  MMode/2D Measurements & Calculations  IVSd: 1.1 cm     LVIDd: 5.3 cm  LVIDs: 3.7 cm  LVPWd: 1.0 cm  FS: 31.0 %  LV mass(C)d: 225.6 grams  LV mass(C)dI: 107.3 grams/m2  Ao root diam: 3.6 cm  asc Aorta Diam: 4.0 cm  LVOT diam: 2.3 cm  LVOT area: 4.3 cm2  Ao root diam index Ht(cm/m): 2.2  Ao root diam index BSA (cm/m2): 1.7  Asc Ao diam index BSA (cm/m2): 1.9  Asc Ao diam index Ht(cm/m): 2.4  LA Volume (BP): 95.0 ml     LA Volume Index (BP): 45.2 ml/m2  RWT: 0.39     Doppler Measurements & Calculations  MV E max jordy: 84.4 cm/sec  TR max jordy: 213.6 cm/sec  TR max P.5 mmHg     ______________________________________________________________________________  Report approved by: Dr Dann Enriquez on 2025 03:52 PM             Discharge Medications   Current Discharge Medication List        START taking these medications    Details   diltiazem ER (CARDIZEM SR) 120 MG CP12 12 hr SR capsule Take 1 capsule (120 mg) by mouth 2 times daily.  Qty: 60 capsule, Refills: 0    Comments: Future refills by PCP Dr. [facility] with phone number  [phone].  Associated Diagnoses: Atrial fibrillation with rapid ventricular response (H)      furosemide (LASIX) 20 MG tablet Take 1 tablet (20 mg) by mouth daily.  Qty: 30 tablet, Refills: 0    Comments: Future refills by PCP Dr. [facility] with phone number [phone].  Associated Diagnoses: Acute diastolic congestive heart failure (H)      !! levalbuterol (XOPENEX) 1.25 MG/3ML neb solution Take 3 mLs (1.25 mg) by nebulization 2 times daily.  Qty: 180 mL, Refills: 0    Comments: Future refills by PCP Dr. [facility] with phone number [phone].  Associated Diagnoses: Moderate persistent asthma without complication      !! levalbuterol (XOPENEX) 1.25 MG/3ML neb solution Take 3 mLs (1.25 mg) by nebulization every 6 hours as needed for wheezing.  Qty: 90 mL, Refills: 0    Associated Diagnoses: Moderate persistent asthma without complication      metoprolol tartrate (LOPRESSOR) 100 MG tablet Take 1 tablet (100 mg) by mouth 2 times daily.  Qty: 60 tablet, Refills: 0    Comments: Future refills by PCP Dr. [facility] with phone number [phone].  Associated Diagnoses: Atrial fibrillation with rapid ventricular response (H)      potassium chloride rocio ER (KLOR-CON M10) 10 MEQ CR tablet Take 1 tablet (10 mEq) by mouth daily.  Qty: 30 tablet, Refills: 0    Comments: Future refills by PCP Dr. [facility] with phone number [phone].  Associated Diagnoses: Hypokalemia      predniSONE (DELTASONE) 20 MG tablet Take 1 tablet (20 mg) by mouth daily for 4 days.  Qty: 4 tablet, Refills: 0    Associated Diagnoses: Moderate persistent asthma without complication      rivaroxaban ANTICOAGULANT (XARELTO) 20 MG TABS tablet Take 1 tablet (20 mg) by mouth daily (with breakfast).  Qty: 30 tablet, Refills: 0    Comments: Future refills by PCP Dr. [facility] with phone number [phone].  Associated Diagnoses: Atrial fibrillation with  "rapid ventricular response (H)       !! - Potential duplicate medications found. Please discuss with provider.        CONTINUE these medications which have CHANGED    Details   fluticasone-salmeterol (ADVAIR DISKUS) 500-50 MCG/ACT inhaler Inhale 1 puff into the lungs every 12 hours.  Qty: 180 each, Refills: 0    Associated Diagnoses: Routine general medical examination at a health care facility; Moderate persistent asthma without complication      montelukast (SINGULAIR) 10 MG tablet Take 1 tablet (10 mg) by mouth at bedtime.  Qty: 30 tablet, Refills: 0    Comments: Future refills by PCP Dr. Mills [facility] with phone number [phone].  Associated Diagnoses: Routine general medical examination at a health care facility; Moderate persistent asthma without complication           CONTINUE these medications which have NOT CHANGED    Details   omeprazole (PRILOSEC) 20 MG DR capsule Take 1 capsule (20 mg) by mouth daily  Qty: 90 capsule, Refills: 2    Associated Diagnoses: Routine general medical examination at a health care facility; Gastroesophageal reflux disease with esophagitis, unspecified whether hemorrhage      simvastatin (ZOCOR) 10 MG tablet TAKE 1 TABLET(10 MG) BY MOUTH AT BEDTIME  Qty: 90 tablet, Refills: 1    Associated Diagnoses: Routine general medical examination at a health care facility; Dyslipidemia      UNABLE TO FIND MEDICATION NAME: \"panangin\" - plain white pill, likely from [place]. Pt reports \"vitamin for heart.\" Unable to confirm content but may have potassium/magnesium. Unclear if once or twice per day           STOP taking these medications       albuterol (VENTOLIN HFA) 108 (90 Base) MCG/ACT inhaler Comments:   Reason for Stopping:         amLODIPine (NORVASC) 5 MG tablet Comments:   Reason for Stopping:         bisoprolol (ZEBETA) 10 MG tablet Comments:   Reason for Stopping:         ibuprofen (ADVIL/MOTRIN) 200 MG capsule Comments:   Reason for Stopping:         ipratropium - " albuterol 0.5 mg/2.5 mg/3 mL (DUONEB) 0.5-2.5 (3) MG/3ML neb solution Comments:   Reason for Stopping:         losartan (COZAAR) 100 MG tablet Comments:   Reason for Stopping:         spironolactone (ALDACTONE) 100 MG tablet Comments:   Reason for Stopping:             Allergies   No Known Allergies

## 2025-02-04 NOTE — TELEPHONE ENCOUNTER
PA Initiation    Medication: LEVALBUTEROL HCL 1.25 MG/3ML IN NEBU  Insurance Company: [employer] - Phone [phone] Fax [phone]  Pharmacy Filling the Rx: [facility] - [address] [place] [address]  Filling Pharmacy Phone:    Filling Pharmacy Fax:    Start Date: 2/4/2025

## 2025-02-04 NOTE — PLAN OF CARE
Goal Outcome Evaluation:  -~Care plan-end of shift note:    -~Orientation/Mentation:A&O x4  -~VS:VSS ex tachy HR at times with activities, asymptomatic.   -~LS/Pulm:Ls diminished crackles bibasilar   -~Tele/Cardiac:Afibw/CVR  -~GI:WDL  -~:WDL  -~Pain:denies  -~Mobility:up indep  -~Skin:scattered bruises  -~Diet:low fat saturated fat Na ,2400 mg, 2000 ml FR  -~Lines/IVs:PIV SL  -~Safety/Concern:calls appropriately  -~Aggression color:green  -~Plan/Shift summary/Goals:denies SOB or any chest discomfort. Plans for possible discharge today pending Cr improvement.

## 2025-02-04 NOTE — PLAN OF CARE
Here for influenza A. A&O x4. Lithuanian speaking. VSS on RA. Infrequent cough, lung sounds clear. Up independently. Tele: afib. Denied pain. Voiding in BR. Tolerating diet. Plan for discharge this afternoon with daughter.

## 2025-02-05 ENCOUNTER — PATIENT OUTREACH (OUTPATIENT)
Dept: CARE COORDINATION | Facility: CLINIC | Age: 75
End: 2025-02-05
Payer: COMMERCIAL

## 2025-02-05 DIAGNOSIS — E78.5 DYSLIPIDEMIA: ICD-10-CM

## 2025-02-05 DIAGNOSIS — Z00.00 ROUTINE GENERAL MEDICAL EXAMINATION AT A HEALTH CARE FACILITY: ICD-10-CM

## 2025-02-05 DIAGNOSIS — J45.40 MODERATE PERSISTENT ASTHMA WITHOUT COMPLICATION: ICD-10-CM

## 2025-02-05 RX ORDER — MONTELUKAST SODIUM 10 MG/1
1 TABLET ORAL AT BEDTIME
Qty: 30 TABLET | Refills: 0 | Status: CANCELLED | OUTPATIENT
Start: 2025-02-05

## 2025-02-05 RX ORDER — LEVALBUTEROL INHALATION SOLUTION 1.25 MG/3ML
1.25 SOLUTION RESPIRATORY (INHALATION) EVERY 6 HOURS PRN
Qty: 90 ML | Refills: 0 | Status: CANCELLED | OUTPATIENT
Start: 2025-02-05

## 2025-02-05 RX ORDER — FLUTICASONE PROPIONATE AND SALMETEROL 500; 50 UG/1; UG/1
1 POWDER RESPIRATORY (INHALATION) EVERY 12 HOURS
Qty: 180 EACH | Refills: 0 | Status: CANCELLED | OUTPATIENT
Start: 2025-02-05

## 2025-02-05 RX ORDER — LEVALBUTEROL INHALATION SOLUTION 1.25 MG/3ML
1.25 SOLUTION RESPIRATORY (INHALATION) 2 TIMES DAILY
Qty: 180 ML | Refills: 0 | Status: CANCELLED | OUTPATIENT
Start: 2025-02-05

## 2025-02-05 NOTE — TELEPHONE ENCOUNTER
Medication Refill - Pharmacy Correction  Told pt they were going to transfer these Rx's to [facility] but Rx's never were transferred. Then instructed family member to contact PCP clinic.      What medication are you calling about (include dose and sig)?:   fluticasone-salmeterol (ADVAIR DISKUS) 500-50 MCG/ACT inhaler   Prescriber: Feroz Urbano MD     levalbuterol (XOPENEX) 1.25 MG/3ML neb solution   levalbuterol (XOPENEX) 1.25 MG/3ML neb solution  (Instructions different on each of these Rx's)  Prescriber: Feroz Urbano MD     montelukast (SINGULAIR) 10 MG tablet   Prescriber: Feroz Urbano MD      simvastatin (ZOCOR) 10 MG tablet   Prescriber: Amber Raya MD     Preferred Pharmacy:  [id number] [place], [place] - [address] [facility] [place] [address]  [address]  [facility] [address]  Phone: [phone] Fax: [phone]    Controlled Substance Agreement on file:   CSA -- Patient Level:    CSA: None found at the patient level.     Do you need a refill? Yes, new Rx's prescribed while pt was in hospital (admitted 1/27/25, discharged 2/4/25)    Patient offered an appointment? Yes, hospital follow-up    Do you have any questions or concerns?  No    Could we send this information to you in [facility] or would you prefer to receive a phone call?:   Patient would prefer a phone call     Okay to leave a detailed message?: Yes at Cell number on file:    Telephone Information:   Mobile [phone]     Rachel A Zoya on 2/5/2025 at 11:13 AM

## 2025-02-05 NOTE — PROGRESS NOTES
[facility]    Background: Transitional Care Management program identified per system criteria and reviewed by [facility] team for possible outreach.    Assessment: Upon chart review, [facility] Team member will not proceed with patient outreach related to this episode of Transitional Care Management program due to reason below:    Patient has active communication with a nurse, provider or care team for reason of post-hospital follow up plan.  Outreach call by [facility] team not indicated to minimize duplicative efforts.     Patient is in active communication via Telephone Nurse Triage with a Registered Nurse from [facility]. No W outreach call needed at this time    Plan: Transitional Care Management episode addressed appropriately per reason noted above.      ERICA Boyle  [phone]  [facility]   *Connected Care Resource Team does NOT follow patient ongoing. Referrals are identified based on internal discharge reports and the outreach is to ensure patient has an understanding of their discharge instructions.

## 2025-02-05 NOTE — TELEPHONE ENCOUNTER
Unable to refill per protocol due to being from specialist. To provider to advise.  Sabrina Vega BSN, RN

## 2025-02-05 NOTE — TELEPHONE ENCOUNTER
FYI - Status Update    Who is Calling: patient    Update: Advair, Zocor, Singulair was out at the [facility]. Please send to a [employer]. [place]    Does caller want a call/response back: Yes     Could we send this information to you in [facility] or would you prefer to receive a phone call?:   Patient would prefer a phone call   Okay to leave a detailed message?: Yes at Cell number on file:    Telephone Information:   Mobile [phone]

## 2025-02-05 NOTE — TELEPHONE ENCOUNTER
Patient was discharged from hospital today.  Two of her medications she could not  as Pharmacy was out of the medication.  The medications are Fluticasone-salmeterol (ADVAIR-DISKUS) 500-50 MCG/ACT inhaler Inhale 1 puff into the lungs every 12 hours-inhalation and Montelukast (SINGULAIR) 10 mg tablet and to take 1 tablet (10mg) by mouth at bedtime - oral.  Patient calling and requesting that two medications be sent to [facility] on [address] in [place].  FNA advised that we could not do this and will send telephone encounter to pcp/clinic to handle in am.  Clinic please phone Arline in am tomorrow on 2/5/2025.        Reason for Disposition   [1] Prescription prescribed recently is not at pharmacy AND [2] triager has access to patient's EMR AND [3] prescription is recorded in the EMR    Additional Information   Negative: [1] Intentional drug overdose AND [2] suicidal thoughts or ideas   Negative: Drug overdose and triager unable to answer question   Negative: Caller requesting a renewal or refill of a medicine patient is currently taking   Negative: Caller requesting information unrelated to medicine   Negative: Caller requesting information about COVID-19 Vaccine   Negative: Caller requesting information about Emergency Contraception   Negative: Caller requesting information about Combined Birth Control Pills   Negative: Caller requesting information about Progestin Birth Control Pills   Negative: Caller requesting information about Post-Op pain or medicines   Negative: Caller requesting a prescription antibiotic (such as Penicillin) for Strep throat and has a positive culture result   Negative: Caller requesting a prescription anti-viral med (such as Tamiflu) and has influenza (flu) symptoms   Negative: Immunization reaction suspected   Negative: Rash while taking a medicine or within 3 days of stopping it   Negative: [1] Asthma and [2] having symptoms of asthma (cough, wheezing, etc.)    Negative: [1] Symptom of illness (e.g., headache, abdominal pain, earache, vomiting) AND [2] more than mild   Negative: Breastfeeding questions about mother's medicines and diet   Negative: MORE THAN A DOUBLE DOSE of a prescription or over-the-counter (OTC) drug   Negative: [1] DOUBLE DOSE (an extra dose or lesser amount) of prescription drug AND [2] any symptoms (e.g., dizziness, nausea, pain, sleepiness)   Negative: [1] DOUBLE DOSE (an extra dose or lesser amount) of over-the-counter (OTC) drug AND [2] any symptoms (e.g., dizziness, nausea, pain, sleepiness)   Negative: Took another person's prescription drug   Negative: [1] DOUBLE DOSE (an extra dose or lesser amount) of prescription drug AND [2] NO symptoms  (Exception: A double dose of antibiotics.)   Negative: Diabetes drug error or overdose (e.g., took wrong type of insulin or took extra dose)   Negative: [1] Prescription not at pharmacy AND [2] was prescribed by PCP recently (Exception: Triager has access to EMR and prescription is recorded there. Go to Home Care and confirm for pharmacy.)   Negative: [1] Pharmacy calling with prescription question AND [2] triager unable to answer question   Negative: [1] Caller has URGENT medicine question about med that PCP or specialist prescribed AND [2] triager unable to answer question   Negative: Medicine patch causing local rash or itching   Negative: [1] Caller has medicine question about med NOT prescribed by PCP AND [2] triager unable to answer question (e.g., compatibility with other med, storage)   Negative: Prescription request for new medicine (not a refill)   Negative: [1] Caller has NON-URGENT medicine question about med that PCP prescribed AND [2] triager unable to answer question   Negative: Caller wants to use a complementary or alternative medicine    Protocols used: Medication Question Call-A-

## 2025-02-05 NOTE — PLAN OF CARE
Physical Therapy Discharge Summary    Reason for therapy discharge:    Discharged to home.    Progress towards therapy goal(s). See goals on Care Plan in [facility] electronic health record for goal details.  Goals partially met.  Barriers to achieving goals:   discharge from facility.    Therapy recommendation(s):    Continue home exercise program.

## 2025-02-06 ENCOUNTER — TELEPHONE (OUTPATIENT)
Dept: CARDIOLOGY | Facility: CLINIC | Age: 75
End: 2025-02-06
Payer: COMMERCIAL

## 2025-02-06 ENCOUNTER — TELEPHONE (OUTPATIENT)
Dept: FAMILY MEDICINE | Facility: CLINIC | Age: 75
End: 2025-02-06
Payer: COMMERCIAL

## 2025-02-06 RX ORDER — FLUTICASONE PROPIONATE AND SALMETEROL 500; 50 UG/1; UG/1
1 POWDER RESPIRATORY (INHALATION) EVERY 12 HOURS
Qty: 180 EACH | Refills: 0 | Status: SHIPPED | OUTPATIENT
Start: 2025-02-06

## 2025-02-06 RX ORDER — MONTELUKAST SODIUM 10 MG/1
1 TABLET ORAL AT BEDTIME
Qty: 30 TABLET | Refills: 0 | Status: SHIPPED | OUTPATIENT
Start: 2025-02-06

## 2025-02-06 RX ORDER — LEVALBUTEROL INHALATION SOLUTION 1.25 MG/3ML
1.25 SOLUTION RESPIRATORY (INHALATION) 2 TIMES DAILY
Qty: 180 ML | Refills: 0 | Status: SHIPPED | OUTPATIENT
Start: 2025-02-06

## 2025-02-06 RX ORDER — SIMVASTATIN 10 MG
10 TABLET ORAL AT BEDTIME
Qty: 90 TABLET | Refills: 1 | Status: SHIPPED | OUTPATIENT
Start: 2025-02-06

## 2025-02-06 RX ORDER — LEVALBUTEROL INHALATION SOLUTION 1.25 MG/3ML
1.25 SOLUTION RESPIRATORY (INHALATION) EVERY 6 HOURS PRN
Qty: 90 ML | Refills: 0 | Status: SHIPPED | OUTPATIENT
Start: 2025-02-06

## 2025-02-06 NOTE — TELEPHONE ENCOUNTER
PA started for .  levalbuterol (XOPENEX) 1.25 MG/3ML neb solution Log into [email or web address] and enter info from below:    Key: [id number]  Patient last name: Parveen  : 1950

## 2025-02-06 NOTE — TELEPHONE ENCOUNTER
"Patient was admitted to [facility] on 1/27/25 who was admitted for fever and cough x 3 days. Cardiology was consulted for atrial fibrillation with RVR. Started on Diltiazem and Metoprolol resulting in CVR.    PMH: obesity and hypertension.      1/28/25: Echo showed EF of 50-55% and moderate bi atrial enlargement with IVC dilation.    IV Lasix diuresed.    Cardiology plan of care: \"Plan for outpatient cardioversion in 3-4 weeks following recovery from influenza and uninterrupted anticoagulation. Recommend continued daily compression stockings following discharge, would not recommend resumption of Amlodipine with LE edema concerns, hold Spironolactone in lieu of higher dose AVN blocking agents.\"    Pt was started on Diltiazem, Lasix, Metoprolol, KCL, Prednisone, Xarelto. PTA Norvasc, Zebeta, Aldactone, NSAID's and Losartan were discontinued at time of discharge.    Pt called and spoke to pt's daughter, Jessica. She denied any questions regarding pt's medications as above.    Pt has had no c/o of SOB, racing HR, chest pain.    Reminded her that pt is scheduled for an OV on 3/6/25 at 0750 with PRICILLA Melissa Alvarez at our [place] [facility].    Daughter verbalized understanding of all information. No further questions. MANUELITO Barraza RN.      "

## 2025-02-12 NOTE — TELEPHONE ENCOUNTER
PA Initiation    Medication: LEVALBUTEROL HCL 1.25 MG/3ML IN NEBU  Insurance Company: [employer] - Phone [phone] Fax [phone]  Pharmacy Filling the Rx: [facility] #[id number] - [place] - [address] AT [facility] [address] [place] [address]  Filling Pharmacy Phone:    Filling Pharmacy Fax:    Start Date: 2/12/2025        Thank you,    Lester Andrews  Oncology Pharmacy Liaison II  [email or web address]  Phone: [phone]  Fax: [phone]

## 2025-02-12 NOTE — TELEPHONE ENCOUNTER
Prior Authorization Approval    Medication: LEVALBUTEROL HCL 1.25 MG/3ML IN NEBU  Authorization Effective Date: 2/12/2025  Authorization Expiration Date: 2/11/2026  Approved Dose/Quantity: 150 ml  Reference #: [id number]   Insurance Company: [employer] - Phone [phone] Fax [phone]  Expected CoPay: $    CoPay Card Available:      Financial Assistance Needed:   Which Pharmacy is filling the prescription: [id number] - [place], [place] - [address] AT [facility] [address]  Pharmacy Notified: yes  Patient Notified: yes      Approved via phone.    Thank you,    Lester Andrews  Oncology Pharmacy Liaison II  [email or web address]  Phone: [phone]  Fax: [phone]

## 2025-02-14 PROBLEM — Z79.01 ON CONTINUOUS ORAL ANTICOAGULATION: Status: ACTIVE | Noted: 2025-02-14

## 2025-03-06 ENCOUNTER — OFFICE VISIT (OUTPATIENT)
Dept: CARDIOLOGY | Facility: CLINIC | Age: 75
End: 2025-03-06
Attending: NURSE PRACTITIONER
Payer: COMMERCIAL

## 2025-03-06 ENCOUNTER — LAB (OUTPATIENT)
Dept: LAB | Facility: CLINIC | Age: 75
End: 2025-03-06
Payer: COMMERCIAL

## 2025-03-06 VITALS
HEART RATE: 83 BPM | OXYGEN SATURATION: 98 % | WEIGHT: 226 LBS | SYSTOLIC BLOOD PRESSURE: 135 MMHG | DIASTOLIC BLOOD PRESSURE: 94 MMHG | HEIGHT: 64 IN | BODY MASS INDEX: 38.58 KG/M2

## 2025-03-06 DIAGNOSIS — I50.21 ACUTE SYSTOLIC CONGESTIVE HEART FAILURE (H): ICD-10-CM

## 2025-03-06 DIAGNOSIS — E87.6 HYPOKALEMIA: ICD-10-CM

## 2025-03-06 DIAGNOSIS — I48.91 ATRIAL FIBRILLATION WITH RAPID VENTRICULAR RESPONSE (H): ICD-10-CM

## 2025-03-06 LAB
ANION GAP SERPL CALCULATED.3IONS-SCNC: 10 MMOL/L (ref 7–15)
BUN SERPL-MCNC: 25.5 MG/DL (ref 8–23)
CALCIUM SERPL-MCNC: 9.8 MG/DL (ref 8.8–10.4)
CHLORIDE SERPL-SCNC: 109 MMOL/L (ref 98–107)
CREAT SERPL-MCNC: 1.15 MG/DL (ref 0.51–0.95)
EGFRCR SERPLBLD CKD-EPI 2021: 50 ML/MIN/1.73M2
GLUCOSE SERPL-MCNC: 79 MG/DL (ref 70–99)
HCO3 SERPL-SCNC: 23 MMOL/L (ref 22–29)
POTASSIUM SERPL-SCNC: 4.1 MMOL/L (ref 3.4–5.3)
SODIUM SERPL-SCNC: 142 MMOL/L (ref 135–145)

## 2025-03-06 RX ORDER — FUROSEMIDE 20 MG/1
10 TABLET ORAL DAILY
Qty: 45 TABLET | Refills: 3 | Status: SHIPPED | OUTPATIENT
Start: 2025-03-06

## 2025-03-06 RX ORDER — DILTIAZEM HYDROCHLORIDE 240 MG/1
240 CAPSULE, COATED, EXTENDED RELEASE ORAL DAILY
Qty: 90 CAPSULE | Refills: 3 | Status: SHIPPED | OUTPATIENT
Start: 2025-03-06

## 2025-03-06 RX ORDER — METOPROLOL TARTRATE 50 MG
75 TABLET ORAL 2 TIMES DAILY
Qty: 270 TABLET | Refills: 3 | Status: SHIPPED | OUTPATIENT
Start: 2025-03-06

## 2025-03-06 RX ORDER — POTASSIUM CHLORIDE 750 MG/1
10 TABLET, EXTENDED RELEASE ORAL DAILY
Qty: 90 TABLET | Refills: 3 | Status: SHIPPED | OUTPATIENT
Start: 2025-03-06 | End: 2025-03-06

## 2025-03-06 NOTE — LETTER
3/6/2025    [address] [address]    RE: Arline Saini       Dear Colleague,     I had the pleasure of seeing Arline Saini in the [facility].  Cardiology Clinic Progress Note  Arline Saini MRN# [id number]   YOB: 1950 Age: 74 year old   Primary Cardiologist: Dr. Walker Reason for visit: hospital follow-up             Assessment and Plan:   Arline Saini is a very pleasant 74 year old female who is here today for hospital follow-up.      1.  Persistent atrial fibrillation RVR, asymptomatic.  Diagnosed during hospitalization for influenza A infection 1/2025.  On Xarelto 20 mg once daily with evening meal for cardioembolic risk reduction in the setting of elevated [id number] score of 3 (female, age x1, HTN) - no missed doses since initiation 1 month ago.  Will arrange cardioversion in the near future.  2.  Hypertension with hypotension on higher doses of AV jorge blocking agents.  Will leave medications unchanged until post cardioversion.  3.  Obesity, BMI 39.      Changes today:   Start taking metoprolol tartrate 75 mg twice daily.  Patient and family request simplifying medication doses.  Discontinue potassium chloride.  Last BMP with potassium 5.1.  Will repeat BMP today.    Patient has overall been feeling well since discharge from the hospital.  ECG completed in clinic today reveals ongoing atrial fibrillation RVR despite decent doses of metoprolol and diltiazem.  She has not tolerated higher doses of metoprolol due to symptomatic hypotension.  As a result, I will leave her medication doses as is today and plan for cardioversion in the near future.  Patient endorses no missed doses of Xarelto since initiation over 1 month ago.  She is aware of the need for a ride to and from her procedure.  She will remain NPO after midnight the night prior to her procedure except sips of water with morning  medications.    She will hold her metoprolol and diltiazem the morning of her procedure.    The risks and benefits of direct current cardioversion were reviewed with the patient including but not limited to the inherent risks of anesthesia, skin irritation, the development of profound bradycardia, and stroke. Patient verbalized understanding and wishes to proceed.    Routine postprocedure follow-up will be arranged.    Melissa Alvarez PA-C  [facility]  Pager: [phone]          History of Presenting Illness:    Arline Saini is a very pleasant 74 year old female with a history of obesity and hypertension.     Patient was admitted to [facility] at the end of January 2025 with fever and cough, found to have influenza A infection.  While in the ED, she was noted to be in atrial fibrillation RVR.  Cardiology was consulted at which time a rate control strategy was recommended and anticoagulation was initiated.  Echocardiogram was completed that revealed LVEF, moderate biatrial enlargement.  TSH WNL.  She also required some diuresis.  She was discharged 2/4/2025 in stable condition.      Patient is here today for hospital follow-up.  The patient does not speak any English and her daughter does the interpreting.  They declined [place] Xcedex .  Patient has overall felt well since discharge from the hospital.  She denies chest pain, palpitations, near-syncope or syncope.  She did have some episodes of lightheadedness/dizziness associated with low blood pressures.  As a result, they decreased her metoprolol to tartrate and her symptoms resolved.  She still has some residual shortness of breath that does improve when she uses her inhalers.    Rather than taking metoprolol tartrate 100 mg twice daily, she has been taking 100 mg AM and 50 mg PM after she developed symptomatic hypotension.    Blood pressure 135/94 and HR  in clinic today. ECG completed in clinic  today reveals atrial fibrillation RVR, 129 bpm, QRS 96.        Social History       Social History     Socioeconomic History     Marital status:      Spouse name: Not on file     Number of children: Not on file     Years of education: Not on file     Highest education level: Not on file   Occupational History     Not on file   Tobacco Use     Smoking status: Former     Current packs/day: 0.00     Types: Cigarettes     Quit date: 2018     Years since quittin.0     Smokeless tobacco: Never     Tobacco comments:     Smoked a few cigarettes every day for 40 years.   Vaping Use     Vaping status: Never Used   Substance and Sexual Activity     Alcohol use: No     Drug use: No     Sexual activity: Never   Other Topics Concern     Parent/sibling w/ CABG, MI or angioplasty before 65F 55M? Yes     Comment: Sister  of stroke at 50   Social History Narrative     Not on file     Social Drivers of Health     Financial Resource Strain: Low Risk  (2025)    Financial Resource Strain      Within the past 12 months, have you or your family members you live with been unable to get utilities (heat, electricity) when it was really needed?: No   Food Insecurity: Low Risk  (2025)    Food Insecurity      Within the past 12 months, did you worry that your food would run out before you got money to buy more?: No      Within the past 12 months, did the food you bought just not last and you didn t have money to get more?: No   Transportation Needs: Low Risk  (2025)    Transportation Needs      Within the past 12 months, has lack of transportation kept you from medical appointments, getting your medicines, non-medical meetings or appointments, work, or from getting things that you need?: No   Physical Activity: Inactive (2024)    Exercise Vital Sign      Days of Exercise per Week: 0 days      Minutes of Exercise per Session: 0 min   Stress: No Stress Concern Present (2024)    [place] of  "Occupational Health - Occupational Stress Questionnaire      Feeling of Stress : Only a little   Social Connections: Unknown (6/5/2024)    Social Connection and Isolation Panel [NHANES]      Frequency of Communication with Friends and Family: Not on file      Frequency of Social Gatherings with Friends and Family: Three times a week      Attends Anabaptist Services: Not on file      Active Member of Clubs or Organizations: Not on file      Attends Club or Organization Meetings: Not on file      Marital Status: Not on file   Interpersonal Safety: Low Risk  (1/29/2025)    Interpersonal Safety      Do you feel physically and emotionally safe where you currently live?: Yes      Within the past 12 months, have you been hit, slapped, kicked or otherwise physically hurt by someone?: No      Within the past 12 months, have you been humiliated or emotionally abused in other ways by your partner or ex-partner?: No   Housing Stability: Low Risk  (1/29/2025)    Housing Stability      Do you have housing? : Yes      Are you worried about losing your housing?: No            Review of Systems:   Please see HPI         Physical Exam:   Vitals: BP (!) 135/94   Pulse 83   Ht 1.626 m (5' 4\")   Wt 102.5 kg (226 lb)   SpO2 98%   BMI 38.79 kg/m     Wt Readings from Last 4 Encounters:   03/06/25 102.5 kg (226 lb)   02/14/25 99.2 kg (218 lb 12.8 oz)   02/04/25 98.7 kg (217 lb 11.2 oz)   01/27/25 108.1 kg (238 lb 6.4 oz)     GEN: well nourished, in no acute distress.  HEENT:  Pupils equal, round. Sclerae nonicteric.   NECK: Supple, no masses appreciated. No JVD  C/V: Irregularly irregular rhythm, tachycardic  RESP: Respirations are unlabored. Clear to auscultation bilaterally without wheezing, rales, or rhonchi.  GI: Abdomen soft, nontender.  EXTREM: no LE edema.  NEURO: Alert and oriented, cooperative.  SKIN: Warm and dry.        Data:   LIPID RESULTS:  Lab Results   Component Value Date    CHOL 198 06/06/2024    CHOL 185 06/11/2021    " "HDL 67 06/06/2024    HDL 55 06/11/2021     (H) 06/06/2024     (H) 06/11/2021    TRIG 88 06/06/2024    TRIG 150 (H) 06/11/2021     LIVER ENZYME RESULTS:  Lab Results   Component Value Date    AST 61 (H) 02/03/2025    AST 24 09/15/2020    ALT 88 (H) 02/03/2025    ALT 29 09/15/2020     CBC RESULTS:  Lab Results   Component Value Date    WBC 7.3 02/03/2025    WBC 5.8 06/11/2021    RBC 5.61 (H) 02/03/2025    RBC 4.31 06/11/2021    HGB 16.1 (H) 02/03/2025    HGB 13.0 06/11/2021    HCT 48.7 (H) 02/03/2025    HCT 38.1 06/11/2021    MCV 87 02/03/2025    MCV 88 06/11/2021    MCH 28.7 02/03/2025    MCH 30.2 06/11/2021    MCHC 33.1 02/03/2025    MCHC 34.1 06/11/2021    RDW 13.6 02/03/2025    RDW 14.0 06/11/2021     02/03/2025     06/11/2021     BMP RESULTS:  Lab Results   Component Value Date     02/14/2025     06/11/2021    POTASSIUM 5.1 02/14/2025    POTASSIUM 3.8 02/03/2022    POTASSIUM 4.0 06/11/2021    CHLORIDE 103 02/14/2025    CHLORIDE 113 (H) 02/03/2022    CHLORIDE 111 (H) 06/11/2021    CO2 22 02/14/2025    CO2 25 02/03/2022    CO2 24 06/11/2021    ANIONGAP 13 02/14/2025    ANIONGAP 4 02/03/2022    ANIONGAP 5 06/11/2021     (H) 02/14/2025    GLC 98 02/03/2022    GLC 95 06/11/2021    BUN 33.3 (H) 02/14/2025    BUN 10 02/03/2022    BUN 24 06/11/2021    CR 1.43 (H) 02/14/2025    CR 0.98 06/11/2021    GFRESTIMATED 39 (L) 02/14/2025    GFRESTIMATED 59 (L) 06/11/2021    GFRESTBLACK 68 06/11/2021    YOANDY 9.7 02/14/2025    YOANDY 8.7 06/11/2021      A1C RESULTS:  Lab Results   Component Value Date    A1C 5.7 (H) 01/05/2021     INR RESULTS:  No results found for: \"INR\"         Medications     Current Outpatient Medications   Medication Sig Dispense Refill     diltiazem ER COATED BEADS (CARDIZEM CD/CARTIA XT) 240 MG 24 hr capsule Take 1 capsule (240 mg) by mouth daily. 30 capsule 0     fluticasone-salmeterol (ADVAIR DISKUS) 500-50 MCG/ACT inhaler Inhale 1 puff into the lungs every 12 " "hours. 180 each 0     furosemide (LASIX) 20 MG tablet Take 0.5 tablets (10 mg) by mouth daily. 30 tablet 0     levalbuterol (XOPENEX) 1.25 MG/3ML neb solution Take 3 mLs (1.25 mg) by nebulization 2 times daily. 180 mL 0     levalbuterol (XOPENEX) 1.25 MG/3ML neb solution Take 3 mLs (1.25 mg) by nebulization every 6 hours as needed for wheezing. 90 mL 0     metoprolol tartrate (LOPRESSOR) 100 MG tablet Take 1 tablet (100 mg) by mouth 2 times daily. (Patient taking differently: Take 150 mg by mouth 2 times daily. 100mg in the morning and 50mg in the evening) 60 tablet 0     potassium chloride rocio ER (KLOR-CON M10) 10 MEQ CR tablet Take 1 tablet (10 mEq) by mouth daily. 30 tablet 0     rivaroxaban ANTICOAGULANT (XARELTO) 20 MG TABS tablet Take 1 tablet (20 mg) by mouth daily (with breakfast). 30 tablet 0     simvastatin (ZOCOR) 10 MG tablet Take 1 tablet (10 mg) by mouth at bedtime. 90 tablet 1     UNABLE TO FIND MEDICATION NAME: \"panangin\" - plain white pill, likely from [place]. Pt reports \"vitamin for heart.\" Unable to confirm content but may have potassium/magnesium. Unclear if once or twice per day       montelukast (SINGULAIR) 10 MG tablet Take 1 tablet (10 mg) by mouth at bedtime. (Patient not taking: Reported on 3/6/2025) 30 tablet 0     omeprazole (PRILOSEC) 20 MG DR capsule Take 1 capsule (20 mg) by mouth daily (Patient not taking: Reported on 3/6/2025) 90 capsule 2          Past Medical History     Past Medical History:   Diagnosis Date     Essential hypertension 2/27/2019     Obesity (BMI 35.0-39.9) with comorbidity (H) 2/27/2019     Past Surgical History:   Procedure Laterality Date     CHOLECYSTECTOMY  2018     Family History   Problem Relation Age of Onset     Cerebrovascular Disease Mother      Hypertension Mother      Hyperlipidemia Mother      Obesity Mother      Cerebrovascular Disease Sister 40     Unknown/Adopted Father             Allergies   Patient has no known allergies.    The longitudinal " plan of care for the diagnosis(es)/condition(s) as documented were addressed during this visit. Due to the added complexity in care, I will continue to support Arline in the subsequent management and with ongoing continuity of care.     40 minutes spent on the date of the encounter doing chart review, history and exam, documentation and further activities as noted above    [facility]  Pager: [phone]      Thank you for allowing me to participate in the care of your patient.      Sincerely,     [facility]  cc:   Referred MD Gray  No address on file

## 2025-03-06 NOTE — PATIENT INSTRUCTIONS
Call the nurse for any questions or concerns at [phone].     Plan:  1. Medication changes:     Start taking metoprolol tartrate 75 mg twice daily.     Discontinue potassium chloride.     2. Cardioversion in the near future.  Nothing to eat or drink after midnight the night prior to your procedure except sips of water with morning medications.  Do not take your diltiazem or metoprolol the morning of your procedure.    3.  Routine postprocedure follow-up   -Scheduling phone number: [phone]    It was great seeing you today!    Melissa Alvarez PA-C  Physician Assistant  [facility]

## 2025-03-06 NOTE — PROGRESS NOTES
Cardiology Clinic Progress Note  Arline Saini MRN# [id number]   YOB: 1950 Age: 74 year old   Primary Cardiologist: Dr. Walker Reason for visit: hospital follow-up             Assessment and Plan:   Arline Saini is a very pleasant 74 year old female who is here today for hospital follow-up.      1.  Persistent atrial fibrillation RVR, asymptomatic.  Diagnosed during hospitalization for influenza A infection 1/2025.  On Xarelto 20 mg once daily with evening meal for cardioembolic risk reduction in the setting of elevated [id number] score of 3 (female, age x1, HTN) - no missed doses since initiation 1 month ago.  Will arrange cardioversion in the near future.  2.  Hypertension with hypotension on higher doses of AV jorge blocking agents.  Will leave medications unchanged until post cardioversion.  3.  Obesity, BMI 39.      Changes today:   Start taking metoprolol tartrate 75 mg twice daily.  Patient and family request simplifying medication doses.  Discontinue potassium chloride.  Last BMP with potassium 5.1.  Will repeat BMP today.    Patient has overall been feeling well since discharge from the hospital.  ECG completed in clinic today reveals ongoing atrial fibrillation RVR despite decent doses of metoprolol and diltiazem.  She has not tolerated higher doses of metoprolol due to symptomatic hypotension.  As a result, I will leave her medication doses as is today and plan for cardioversion in the near future.  Patient endorses no missed doses of Xarelto since initiation over 1 month ago.  She is aware of the need for a ride to and from her procedure.  She will remain NPO after midnight the night prior to her procedure except sips of water with morning medications.    She will hold her metoprolol and diltiazem the morning of her procedure.    The risks and benefits of direct current cardioversion were reviewed with the patient including but not limited to the inherent risks of anesthesia, skin  irritation, the development of profound bradycardia, and stroke. Patient verbalized understanding and wishes to proceed.    Routine postprocedure follow-up will be arranged.    Melissa Alvarez PA-C  [facility]  Pager: [phone]          History of Presenting Illness:    Arline Saini is a very pleasant 74 year old female with a history of obesity and hypertension.     Patient was admitted to [facility] at the end of January 2025 with fever and cough, found to have influenza A infection.  While in the ED, she was noted to be in atrial fibrillation RVR.  Cardiology was consulted at which time a rate control strategy was recommended and anticoagulation was initiated.  Echocardiogram was completed that revealed LVEF, moderate biatrial enlargement.  TSH WNL.  She also required some diuresis.  She was discharged 2/4/2025 in stable condition.      Patient is here today for hospital follow-up.  The patient does not speak any English and her daughter does the interpreting.  They declined [email or web address] .  Patient has overall felt well since discharge from the hospital.  She denies chest pain, palpitations, near-syncope or syncope.  She did have some episodes of lightheadedness/dizziness associated with low blood pressures.  As a result, they decreased her metoprolol to tartrate and her symptoms resolved.  She still has some residual shortness of breath that does improve when she uses her inhalers.    Rather than taking metoprolol tartrate 100 mg twice daily, she has been taking 100 mg AM and 50 mg PM after she developed symptomatic hypotension.    Blood pressure 135/94 and HR  in clinic today. ECG completed in clinic today reveals atrial fibrillation RVR, 129 bpm, QRS 96.        Social History       Social History     Socioeconomic History    Marital status:      Spouse name: Not on file    Number of children: Not on file    Years of education: Not on file     Highest education level: Not on file   Occupational History    Not on file   Tobacco Use    Smoking status: Former     Current packs/day: 0.00     Types: Cigarettes     Quit date: 2018     Years since quittin.0    Smokeless tobacco: Never    Tobacco comments:     Smoked a few cigarettes every day for 40 years.   Vaping Use    Vaping status: Never Used   Substance and Sexual Activity    Alcohol use: No    Drug use: No    Sexual activity: Never   Other Topics Concern    Parent/sibling w/ CABG, MI or angioplasty before 65F 55M? Yes     Comment: Sister  of stroke at 50   Social History Narrative    Not on file     Social Drivers of Health     Financial Resource Strain: Low Risk  (2025)    Financial Resource Strain     Within the past 12 months, have you or your family members you live with been unable to get utilities (heat, electricity) when it was really needed?: No   Food Insecurity: Low Risk  (2025)    Food Insecurity     Within the past 12 months, did you worry that your food would run out before you got money to buy more?: No     Within the past 12 months, did the food you bought just not last and you didn t have money to get more?: No   Transportation Needs: Low Risk  (2025)    Transportation Needs     Within the past 12 months, has lack of transportation kept you from medical appointments, getting your medicines, non-medical meetings or appointments, work, or from getting things that you need?: No   Physical Activity: Inactive (2024)    Exercise Vital Sign     Days of Exercise per Week: 0 days     Minutes of Exercise per Session: 0 min   Stress: No Stress Concern Present (2024)    [place] [employer] - Occupational Stress Questionnaire     Feeling of Stress : Only a little   Social Connections: Unknown (2024)    Social Connection and Isolation Panel [NHANES]     Frequency of Communication with Friends and Family: Not on file     Frequency of Social  "Gatherings with Friends and Family: Three times a week     Attends Temple Services: Not on file     Active Member of Clubs or Organizations: Not on file     Attends Club or Organization Meetings: Not on file     Marital Status: Not on file   Interpersonal Safety: Low Risk  (1/29/2025)    Interpersonal Safety     Do you feel physically and emotionally safe where you currently live?: Yes     Within the past 12 months, have you been hit, slapped, kicked or otherwise physically hurt by someone?: No     Within the past 12 months, have you been humiliated or emotionally abused in other ways by your partner or ex-partner?: No   Housing Stability: Low Risk  (1/29/2025)    Housing Stability     Do you have housing? : Yes     Are you worried about losing your housing?: No            Review of Systems:   Please see HPI         Physical Exam:   Vitals: BP (!) 135/94   Pulse 83   Ht 1.626 m (5' 4\")   Wt 102.5 kg (226 lb)   SpO2 98%   BMI 38.79 kg/m     Wt Readings from Last 4 Encounters:   03/06/25 102.5 kg (226 lb)   02/14/25 99.2 kg (218 lb 12.8 oz)   02/04/25 98.7 kg (217 lb 11.2 oz)   01/27/25 108.1 kg (238 lb 6.4 oz)     GEN: well nourished, in no acute distress.  HEENT:  Pupils equal, round. Sclerae nonicteric.   NECK: Supple, no masses appreciated. No JVD  C/V: Irregularly irregular rhythm, tachycardic  RESP: Respirations are unlabored. Clear to auscultation bilaterally without wheezing, rales, or rhonchi.  GI: Abdomen soft, nontender.  EXTREM: no LE edema.  NEURO: Alert and oriented, cooperative.  SKIN: Warm and dry.        Data:   LIPID RESULTS:  Lab Results   Component Value Date    CHOL 198 06/06/2024    CHOL 185 06/11/2021    HDL 67 06/06/2024    HDL 55 06/11/2021     (H) 06/06/2024     (H) 06/11/2021    TRIG 88 06/06/2024    TRIG 150 (H) 06/11/2021     LIVER ENZYME RESULTS:  Lab Results   Component Value Date    AST 61 (H) 02/03/2025    AST 24 09/15/2020    ALT 88 (H) 02/03/2025    ALT 29 " "09/15/2020     CBC RESULTS:  Lab Results   Component Value Date    WBC 7.3 02/03/2025    WBC 5.8 06/11/2021    RBC 5.61 (H) 02/03/2025    RBC 4.31 06/11/2021    HGB 16.1 (H) 02/03/2025    HGB 13.0 06/11/2021    HCT 48.7 (H) 02/03/2025    HCT 38.1 06/11/2021    MCV 87 02/03/2025    MCV 88 06/11/2021    MCH 28.7 02/03/2025    MCH 30.2 06/11/2021    MCHC 33.1 02/03/2025    MCHC 34.1 06/11/2021    RDW 13.6 02/03/2025    RDW 14.0 06/11/2021     02/03/2025     06/11/2021     BMP RESULTS:  Lab Results   Component Value Date     02/14/2025     06/11/2021    POTASSIUM 5.1 02/14/2025    POTASSIUM 3.8 02/03/2022    POTASSIUM 4.0 06/11/2021    CHLORIDE 103 02/14/2025    CHLORIDE 113 (H) 02/03/2022    CHLORIDE 111 (H) 06/11/2021    CO2 22 02/14/2025    CO2 25 02/03/2022    CO2 24 06/11/2021    ANIONGAP 13 02/14/2025    ANIONGAP 4 02/03/2022    ANIONGAP 5 06/11/2021     (H) 02/14/2025    GLC 98 02/03/2022    GLC 95 06/11/2021    BUN 33.3 (H) 02/14/2025    BUN 10 02/03/2022    BUN 24 06/11/2021    CR 1.43 (H) 02/14/2025    CR 0.98 06/11/2021    GFRESTIMATED 39 (L) 02/14/2025    GFRESTIMATED 59 (L) 06/11/2021    GFRESTBLACK 68 06/11/2021    YOANDY 9.7 02/14/2025    YOANDY 8.7 06/11/2021      A1C RESULTS:  Lab Results   Component Value Date    A1C 5.7 (H) 01/05/2021     INR RESULTS:  No results found for: \"INR\"         Medications     Current Outpatient Medications   Medication Sig Dispense Refill    diltiazem ER COATED BEADS (CARDIZEM CD/CARTIA XT) 240 MG 24 hr capsule Take 1 capsule (240 mg) by mouth daily. 30 capsule 0    fluticasone-salmeterol (ADVAIR DISKUS) 500-50 MCG/ACT inhaler Inhale 1 puff into the lungs every 12 hours. 180 each 0    furosemide (LASIX) 20 MG tablet Take 0.5 tablets (10 mg) by mouth daily. 30 tablet 0    levalbuterol (XOPENEX) 1.25 MG/3ML neb solution Take 3 mLs (1.25 mg) by nebulization 2 times daily. 180 mL 0    levalbuterol (XOPENEX) 1.25 MG/3ML neb solution Take 3 mLs (1.25 " "mg) by nebulization every 6 hours as needed for wheezing. 90 mL 0    metoprolol tartrate (LOPRESSOR) 100 MG tablet Take 1 tablet (100 mg) by mouth 2 times daily. (Patient taking differently: Take 150 mg by mouth 2 times daily. 100mg in the morning and 50mg in the evening) 60 tablet 0    potassium chloride rocio ER (KLOR-CON M10) 10 MEQ CR tablet Take 1 tablet (10 mEq) by mouth daily. 30 tablet 0    rivaroxaban ANTICOAGULANT (XARELTO) 20 MG TABS tablet Take 1 tablet (20 mg) by mouth daily (with breakfast). 30 tablet 0    simvastatin (ZOCOR) 10 MG tablet Take 1 tablet (10 mg) by mouth at bedtime. 90 tablet 1    UNABLE TO FIND MEDICATION NAME: \"panangin\" - plain white pill, likely from [place]. Pt reports \"vitamin for heart.\" Unable to confirm content but may have potassium/magnesium. Unclear if once or twice per day      montelukast (SINGULAIR) 10 MG tablet Take 1 tablet (10 mg) by mouth at bedtime. (Patient not taking: Reported on 3/6/2025) 30 tablet 0    omeprazole (PRILOSEC) 20 MG DR capsule Take 1 capsule (20 mg) by mouth daily (Patient not taking: Reported on 3/6/2025) 90 capsule 2          Past Medical History     Past Medical History:   Diagnosis Date    Essential hypertension 2/27/2019    Obesity (BMI 35.0-39.9) with comorbidity (H) 2/27/2019     Past Surgical History:   Procedure Laterality Date    CHOLECYSTECTOMY  2018     Family History   Problem Relation Age of Onset    Cerebrovascular Disease Mother     Hypertension Mother     Hyperlipidemia Mother     Obesity Mother     Cerebrovascular Disease Sister 40    Unknown/Adopted Father             Allergies   Patient has no known allergies.    The longitudinal plan of care for the diagnosis(es)/condition(s) as documented were addressed during this visit. Due to the added complexity in care, I will continue to support [place] in the subsequent management and with ongoing continuity of care.     40 minutes spent on the date of the encounter doing chart review, " history and exam, documentation and further activities as noted above    Melissa Alvarez PA-C  [facility]  Pager: [phone]

## 2025-03-11 ENCOUNTER — TELEPHONE (OUTPATIENT)
Dept: CARDIOLOGY | Facility: CLINIC | Age: 75
End: 2025-03-11
Payer: COMMERCIAL

## 2025-03-11 NOTE — TELEPHONE ENCOUNTER
DCCV/BERNARDO prep instructions    Patient is scheduled for a Cardioversion at [facility] - [address], [place] [address] - Main Entrance of the Hospital, on 3/17/25.  Check in time is at 0630 and procedure to follow.    Patient instructed to remain NPO for 8 hours prior to procedure, with the exception of their Xarelto   NPO after 10:30 pm. Patient to take Xarelto in am    Patient is taking Pradaxa/Xarelto/Eliquis and has been taking daily uninterrupted for at least 21days.     Patient is not diabetic.     Patient is not taking Digoxin.    Patient is taking taking furosemide and has been instructed to hold it the morning of procedure. and has been advised to hold this the morning of the procedure.    Also, per pre-procedure appointment. Patient to HOLD metoprolol and diltiazem the morning of procedure    Pt is not on a SGLT2 inhibitor.    Pt is not on a GLP-1 Agonist    Patient advised to take their other daily medications the morning of the procedure with small sips of water.     Verified patient has someone available to drive them home from the hospital and can stay with them for 24 hours after the procedure.     Patient advised to notify care team with any new COVID like symptoms prior to procedure.    Patient advised to notify care team with any new COVID like symptoms prior to procedure. Day of procedure phone number: Tai at [phone]    Patient is aware of visitor policy.    Nicole Monge RN  [facility]

## 2025-03-11 NOTE — TELEPHONE ENCOUNTER
----- Message from Nicole PINEDO sent at 3/6/2025  4:48 PM CST -----  Regarding: FW: 3/17/2025 Cardioversion  Pre-procedure call   Patient to hold metoprolol and diltiazem day of procedure  ----- Message -----  From: Dick April  Sent: 3/6/2025   8:57 AM CST  To: Kristin Barahona; Yeni Jefferson; [facility] Team 3  Subject: 3/17/2025 Cardioversion                          DCCV/BERNARDO Orders    Location: [facility]    Procedure: Cardioversion     needed: Yes - Language:  Russian    Diagnosis:  Atrial fibrillation with rapid ventricular response     Procedure Date: 3/17/2025    Procedure Time: 8:30 AM    Patient Arrival Time: 6:30 AM    Ordering Cardiologist: Dr. Walker    Performing Cardiologist: Dr. Jane    Cardiac Assessment Completed: Yes  Date: 3/6/2025  Provider: Melissa Alvarez    NPO: Patient instructed to remain NPO for solid foods 8 hours prior to arrival and may have clear liquids up to 2 hours prior to arrival for their DCCV.    Patient on Coumadin/Warfarin:  No  Patient on Pradaxa/Xarelto/Eliquis:  Yes  Patient on Invokana/Farxiga/Inpefa/Jardiance/Steglatro/Synjardy:  No  Patient on Adlyxin/Bydureon/Byetta/Mounjaro/Ozempic/Rybelsus/Soliquo/Trulicity/Victoza/Wegovy/Zepbound: No    Previous procedure records requested by MD?  No    Additional labs needed at check in? No    Appointment was scheduled: Face to Face    If pt is having a Cardioverion:  Does the pt have a device: No

## 2025-03-12 NOTE — TELEPHONE ENCOUNTER
Writer got call from Arline's daughter, Jessica.  She confirmed they received the [employer] message, and verbalized back to writer the information correctly.    States she will call with any further questions\concerns.  She will be the person with Arline.    Janine Copeland RN on 3/12/2025 at 2:01 PM

## 2025-03-12 NOTE — TELEPHONE ENCOUNTER
Forgotten [place] message sent.  Request to verify auth sent.    Janine Copeland RN on 3/12/2025 at 10:17 AM

## 2025-03-16 ENCOUNTER — ANESTHESIA EVENT (OUTPATIENT)
Dept: SURGERY | Facility: CLINIC | Age: 75
End: 2025-03-16
Payer: COMMERCIAL

## 2025-03-16 ASSESSMENT — ENCOUNTER SYMPTOMS: DYSRHYTHMIAS: 1

## 2025-03-17 ENCOUNTER — APPOINTMENT (OUTPATIENT)
Dept: MEDSURG UNIT | Facility: CLINIC | Age: 75
End: 2025-03-17
Attending: INTERNAL MEDICINE
Payer: COMMERCIAL

## 2025-03-17 ENCOUNTER — HOSPITAL ENCOUNTER (OUTPATIENT)
Facility: CLINIC | Age: 75
Discharge: HOME | End: 2025-03-17
Admitting: INTERNAL MEDICINE
Payer: COMMERCIAL

## 2025-03-17 ENCOUNTER — OFFICE VISIT (OUTPATIENT)
Dept: INTERPRETER SERVICES | Facility: CLINIC | Age: 75
End: 2025-03-17

## 2025-03-17 ENCOUNTER — ANESTHESIA (OUTPATIENT)
Dept: SURGERY | Facility: CLINIC | Age: 75
End: 2025-03-17
Payer: COMMERCIAL

## 2025-03-17 VITALS
BODY MASS INDEX: 38.93 KG/M2 | WEIGHT: 228 LBS | HEART RATE: 67 BPM | DIASTOLIC BLOOD PRESSURE: 71 MMHG | OXYGEN SATURATION: 97 % | HEIGHT: 64 IN | RESPIRATION RATE: 16 BRPM | TEMPERATURE: 97.6 F | SYSTOLIC BLOOD PRESSURE: 111 MMHG

## 2025-03-17 DIAGNOSIS — I48.91 ATRIAL FIBRILLATION WITH RAPID VENTRICULAR RESPONSE (H): ICD-10-CM

## 2025-03-17 LAB
MAGNESIUM SERPL-MCNC: 2.1 MG/DL (ref 1.7–2.3)
POTASSIUM SERPL-SCNC: 4.8 MMOL/L (ref 3.4–5.3)

## 2025-03-17 PROCEDURE — 999N000184 HC STATISTIC TELEMETRY

## 2025-03-17 PROCEDURE — 999N000010 HC STATISTIC ANES STAT CODE-CRNA PER MINUTE

## 2025-03-17 PROCEDURE — 83735 ASSAY OF MAGNESIUM: CPT | Performed by: INTERNAL MEDICINE

## 2025-03-17 PROCEDURE — 999N000054 HC STATISTIC EKG NON-CHARGEABLE

## 2025-03-17 PROCEDURE — 258N000003 HC RX IP 258 OP 636: Performed by: INTERNAL MEDICINE

## 2025-03-17 PROCEDURE — 250N000011 HC RX IP 250 OP 636: Performed by: NURSE ANESTHETIST, CERTIFIED REGISTERED

## 2025-03-17 PROCEDURE — T1013 SIGN LANG/ORAL INTERPRETER: HCPCS | Performed by: INTERPRETER

## 2025-03-17 PROCEDURE — 370N000017 HC ANESTHESIA TECHNICAL FEE, PER MIN

## 2025-03-17 PROCEDURE — 93005 ELECTROCARDIOGRAM TRACING: CPT

## 2025-03-17 PROCEDURE — 92960 CARDIOVERSION ELECTRIC EXT: CPT

## 2025-03-17 PROCEDURE — 93010 ELECTROCARDIOGRAM REPORT: CPT | Mod: XU | Performed by: INTERNAL MEDICINE

## 2025-03-17 PROCEDURE — 84132 ASSAY OF SERUM POTASSIUM: CPT | Performed by: INTERNAL MEDICINE

## 2025-03-17 PROCEDURE — 92960 CARDIOVERSION ELECTRIC EXT: CPT | Performed by: INTERNAL MEDICINE

## 2025-03-17 PROCEDURE — 36415 COLL VENOUS BLD VENIPUNCTURE: CPT | Performed by: INTERNAL MEDICINE

## 2025-03-17 RX ORDER — LIDOCAINE 40 MG/G
CREAM TOPICAL
Status: DISCONTINUED | OUTPATIENT
Start: 2025-03-17 | End: 2025-03-17 | Stop reason: HOSPADM

## 2025-03-17 RX ORDER — BENZOCAINE/MENTHOL 6 MG-10 MG
LOZENGE MUCOUS MEMBRANE 3 TIMES DAILY PRN
Status: DISCONTINUED | OUTPATIENT
Start: 2025-03-17 | End: 2025-03-17 | Stop reason: HOSPADM

## 2025-03-17 RX ORDER — MAGNESIUM SULFATE HEPTAHYDRATE 40 MG/ML
2 INJECTION, SOLUTION INTRAVENOUS
Status: DISCONTINUED | OUTPATIENT
Start: 2025-03-17 | End: 2025-03-17 | Stop reason: HOSPADM

## 2025-03-17 RX ORDER — PROPOFOL 10 MG/ML
INJECTION, EMULSION INTRAVENOUS PRN
Status: DISCONTINUED | OUTPATIENT
Start: 2025-03-17 | End: 2025-03-17

## 2025-03-17 RX ORDER — POTASSIUM CHLORIDE 1500 MG/1
20 TABLET, EXTENDED RELEASE ORAL
Status: DISCONTINUED | OUTPATIENT
Start: 2025-03-17 | End: 2025-03-17 | Stop reason: HOSPADM

## 2025-03-17 RX ORDER — SODIUM CHLORIDE 9 MG/ML
INJECTION, SOLUTION INTRAVENOUS CONTINUOUS
Status: DISCONTINUED | OUTPATIENT
Start: 2025-03-17 | End: 2025-03-17 | Stop reason: HOSPADM

## 2025-03-17 RX ADMIN — PROPOFOL 70 MG: 10 INJECTION, EMULSION INTRAVENOUS at 08:42

## 2025-03-17 RX ADMIN — PROPOFOL 30 MG: 10 INJECTION, EMULSION INTRAVENOUS at 08:44

## 2025-03-17 RX ADMIN — SODIUM CHLORIDE: 9 INJECTION, SOLUTION INTRAVENOUS at 07:25

## 2025-03-17 ASSESSMENT — ACTIVITIES OF DAILY LIVING (ADL)
ADLS_ACUITY_SCORE: 48

## 2025-03-17 NOTE — PRE-PROCEDURE
GENERAL PRE-PROCEDURE:   Procedure:  Cardioversion of afib  Date/Time:  3/17/2025 8:33 AM    Verbal consent obtained?: Yes    Written consent obtained?: Yes    Risks and benefits: Risks, benefits and alternatives were discussed    DC Plan: Appropriate discharge home plan in place for patients who are going home after procedure   Consent given by:  Patient  Patient states understanding of procedure being performed: Yes    Patient's understanding of procedure matches consent: Yes    Procedure consent matches procedure scheduled: Yes    Expected level of sedation:  Deep  Appropriately NPO:  Yes  ASA Class:  2  Mallampati  :  Grade 2- soft palate, base of uvula, tonsillar pillars, and portion of posterior pharyngeal wall visible  Lungs:  Lungs clear with good breath sounds bilaterally  Heart:  A-fib  History & Physical reviewed:  History and physical reviewed and no updates needed  Statement of review:  I have reviewed the lab findings, diagnostic data, medications, and the plan for sedation

## 2025-03-17 NOTE — PROCEDURES
[facility]    Procedure: Cardioversion External    Date/Time: 3/17/2025 8:44 AM    Performed by: Mir Jane MD  Authorized by: Melissa Alvarez PA-C      UNIVERSAL PROTOCOL   Site Marked: NA  Prior Images Obtained and Reviewed:  Yes  Required items: Required blood products, implants, devices and special equipment available    Patient identity confirmed:  Verbally with patient, arm band, provided demographic data and hospital-assigned identification number  Patient was reevaluated immediately before administering moderate or deep sedation or anesthesia  Confirmation Checklist:  Patient's identity using two indicators, relevant allergies, procedure was appropriate and matched the consent or emergent situation and correct equipment/implants were available  Time out: Immediately prior to the procedure a time out was called    Universal Protocol: the Joint Commission Universal Protocol was followed    Preparation: Patient was prepped and draped in usual sterile fashion      SEDATION  Patient Sedated: Yes    Sedation Type:  Deep  Sedation:  Propofol  Vital signs: Vital signs monitored during sedation      PROCEDURE DETAILS  Cardioversion basis: elective  Indications: failure of anti-arrhythmic medications  Pre-procedure rhythm: atrial fibrillation  Patient position: patient was placed in a supine position  Chest area: chest area exposed  Electrodes: pads  Electrodes placed: anterior-posterior  Number of attempts: 2    Details of Attempts:  Two shocks were given.  The first shock with 200 J, synchronized, with pads in AP position was unsuccessful.    The second shock with 200 J, synchronized, with pads in AP position was successful in converting the patient to sinus rhythm.  Xarelto Xarelto  Post-procedure rhythm: normal sinus rhythm  Complications: no complications      PROCEDURE    Patient Tolerance:  Patient tolerated the procedure well with no immediate complications  Length of time  physician/provider present for 1:1 monitoring during sedation: 15   The patient was confirmed to be taking Xarelto for at least a month prior to the procedure and has not missed any doses. He was advised to continue Xarelto after the procedure.  I recommended that she stop her diltiazem.  She will continue metoprolol tartrate at a reduced dose of 50 mg p.o. twice daily.

## 2025-03-17 NOTE — ANESTHESIA POSTPROCEDURE EVALUATION
Patient: Arline Saini    Procedure: Procedure(s):  Anesthesia cardioversion       Anesthesia Type:  MAC    Note:  Disposition: Outpatient   Postop Pain Control: Uneventful            Sign Out: Well controlled pain   PONV: No   Neuro/Psych: Uneventful            Sign Out: Acceptable/Baseline neuro status   Airway/Respiratory: Uneventful            Sign Out: Acceptable/Baseline resp. status   CV/Hemodynamics: Uneventful            Sign Out: Acceptable CV status   Other NRE: NONE   DID A NON-ROUTINE EVENT OCCUR? No           Last vitals:  Vitals:    03/17/25 0814 03/17/25 0847 03/17/25 0901   BP: (!) 127/97 (!) 137/98 90/67   Pulse: 112 73 67   Resp: 18 18 18   Temp:      SpO2: 96% 93% 98%       Electronically Signed By: Ady Smith MD  March 17, 2025  9:05 AM

## 2025-03-17 NOTE — ANESTHESIA CARE TRANSFER NOTE
Patient: Arline Saini    Procedure: Procedure(s):  Anesthesia cardioversion       Diagnosis: A-fib (H) [I48.91]  Diagnosis Additional Information: No value filed.    Anesthesia Type:   No value filed.     Note:    Oropharynx: oropharynx clear of all foreign objects and spontaneously breathing  Level of Consciousness: awake  Oxygen Supplementation: nasal cannula  Level of Supplemental Oxygen (L/min / FiO2): 3  Independent Airway: airway patency satisfactory and stable  Dentition: dentition unchanged  Vital Signs Stable: post-procedure vital signs reviewed and stable  Report to RN Given: handoff report given  Patient transferred to: PACU  Comments: Diagnosis: Atrial Fibrillation.  Procedure: Cardioversion.  Cardiologist: Dr. Jane.  Location: [facility].  Handoff Report: Identifed the Patient, Identified the Reponsible Provider, Reviewed the pertinent medical history, Discussed the surgical course, Reviewed Intra-OP anesthesia mangement and issues during anesthesia, Set expectations for post-procedure period and Allowed opportunity for questions and acknowledgement of understanding      Vitals:  Vitals Value Taken Time   /71 03/17/25 0855   Temp     Pulse 66 03/17/25 0859   Resp 20 03/17/25 0859   SpO2 95 % 03/17/25 0859   Vitals shown include unfiled device data.    Electronically Signed By: REGGIE Hdz CRNA  March 17, 2025  8:50 AM

## 2025-03-17 NOTE — DISCHARGE INSTRUCTIONS
Cardioversion Discharge Instructions    After you go home:       For 24 hours - due to the sedation you received:    Have an adult stay with you for 24 hours.   Relax and take it easy.  Do NOT make any important or legal decisions.  Do NOT drive or operate machines at home or at work.  Do NOT drink alcohol.    Diet:    Start with clear liquids and progress to your normal diet as you feel able.    Medicines:    Take your medications, including blood thinners, unless your provider tells you not to.  If you have stopped any medications, check with your provider about when to restart them.    Follow Up Appointments:    Follow up with your cardiologist at [facility] of patient preference as instructed.  Follow up with your primary care provider as needed.    Post cardioversion:    The skin on your chest or back may feel tender for 48 hours.  If your skin is tender, you may:    Use a cold pack on the site. Never use ice directly on your skin. Use the cold pack for 20 minutes. Remove it for at least 30 minutes before re-using.  Apply 1% hydrocortisone cream to the skin (sold at drug stores)  Take Advil (Ibuprofen) or Tylenol (Acetaminophen) per your provider's recommendations.      Call your provider if you have:    Weakness, dizziness, lightheadedness, or fainting.  Shortness of breath.  Irregular heartbeat, feelings of your heart fluttering or beating fast, hard or palpitations.   More than minor skin discomfort or redness where the cardioversion pads were placed.  Questions or concerns.      Call 911 if you have:    Pain in your chest, arm, shoulder, neck, or upper back.  You have problems speaking or seeing.  Weakness in your arm or leg.  You are unable to move your arm or leg.  You have uncontrolled bleeding.         [facility] [place]:    [phone] [facility] (7 days a week)

## 2025-03-17 NOTE — PROGRESS NOTES
Care Suites Admission Nursing Note    Patient Information  Name: Arline Saini  Age: 74 year old  Reason for admission: [facility]  Care Suites arrival time: 0630    Visitor Information  Name: Jessica     Patient Admission/Assessment   Pre-procedure assessment complete: Yes  If abnormal assessment/labs, provider notified: N/A  NPO: Yes  Medications held per instructions/orders: Yes  Consent: obtained  If applicable, pregnancy test status: deferred  Patient oriented to room: Yes  Education/questions answered: Yes  Plan/other: Proceed    Discharge Planning  Discharge name/phone number: Jessica (daughter)  Overnight post sedation caregiver: Jessica  Discharge location: home    Erlinda Chu RN

## 2025-03-17 NOTE — ANESTHESIA PREPROCEDURE EVALUATION
Anesthesia Pre-Procedure Evaluation    Patient: Sara Saini   MRN: [id number] : 1950        Procedure : Procedure(s):  Anesthesia cardioversion          Past Medical History:   Diagnosis Date    Essential hypertension 2019    Obesity (BMI 35.0-39.9) with comorbidity (H) 2019      Past Surgical History:   Procedure Laterality Date    CHOLECYSTECTOMY  2018      No Known Allergies   Social History     Tobacco Use    Smoking status: Former     Current packs/day: 0.00     Types: Cigarettes     Quit date: 2018     Years since quittin.0    Smokeless tobacco: Never    Tobacco comments:     Smoked a few cigarettes every day for 40 years.   Substance Use Topics    Alcohol use: No      Wt Readings from Last 1 Encounters:   25 102.5 kg (226 lb)        Anesthesia Evaluation            ROS/MED HX  ENT/Pulmonary:     (+)                      asthma                  Neurologic:       Cardiovascular:     (+)  hypertension- -   -  - -                        dysrhythmias, a-fib,             METS/Exercise Tolerance:     Hematologic:       Musculoskeletal:       GI/Hepatic:     (+) GERD,                   Renal/Genitourinary:     (+) renal disease, type: CRI,            Endo:     (+)               Obesity,       Psychiatric/Substance Use:       Infectious Disease:       Malignancy:       Other:                Echocardiogram Complete  Order: [id number]  Status: Edited Result - FINAL       Visible to patient: Yes (seen)       Next appt: 2025 at 06:30 AM in  ([facility])    0 Result Notes  Details    Reading Physician Reading Date Result Priority   Dann Enriquez MD  [phone] 2025      Result Text  [id number]  [id number]  [id number]  [id number]^HARPAL^GENOVEVA     [facility]  Echocardiography Laboratory  [address] [place] [address]     Name: SARA SAINI  MRN: [id number]  : 1950  Study Date: 2025 02:53 PM  Age: 74  "yrs  Gender: Female  Patient Location: [facility]  Reason For Study: Atrial Fibrillation  Ordering Physician: GENOVEVA ROWAN  Referring Physician: Haley Skokie Elise Hart  Performed By: Cleo Pereira RDCS     BSA: 2.1 m2  Height: 65 in  Weight: 231 lb  HR: 103  BP: 115/88 mmHg  ______________________________________________________________________________  Procedure  Echocardiogram with two-dimensional, color and spectral Doppler. Definity (NDC  #[id number]) given intravenously.  ______________________________________________________________________________  Interpretation Summary     Left ventricular systolic function is normal.  The visual ejection fraction is 50-55%.  Normal right ventricular size and systolic function.  Moderate biatrial enlargement.  Mild mitral valve regurgitation.  Dilated inferior vena cava.  Stable ascending aorta dilatation of 4.0 cm.  Rhythm is atrial fibrillation.     Physical Exam    Airway  airway exam normal      Mallampati: II   TM distance: > 3 FB   Neck ROM: full   Mouth opening: > 3 cm    Respiratory Devices and Support         Dental       (+) Minor Abnormalities - some fillings, tiny chips      Cardiovascular   cardiovascular exam normal          Pulmonary   pulmonary exam normal                OUTSIDE LABS:  CBC:   Lab Results   Component Value Date    WBC 7.3 02/03/2025    WBC 7.9 01/29/2025    HGB 16.1 (H) 02/03/2025    HGB 13.5 01/29/2025    HCT 48.7 (H) 02/03/2025    HCT 40.5 01/29/2025     02/03/2025     01/29/2025     BMP:   Lab Results   Component Value Date     03/06/2025     02/14/2025    POTASSIUM 4.1 03/06/2025    POTASSIUM 5.1 02/14/2025    CHLORIDE 109 (H) 03/06/2025    CHLORIDE 103 02/14/2025    CO2 23 03/06/2025    CO2 22 02/14/2025    BUN 25.5 (H) 03/06/2025    BUN 33.3 (H) 02/14/2025    CR 1.15 (H) 03/06/2025    CR 1.43 (H) 02/14/2025    GLC 79 03/06/2025     (H) 02/14/2025     COAGS: No results found for: \"PTT\", \"INR\", " "\"FIBR\"  POC: No results found for: \"BGM\", \"HCG\", \"HCGS\"  HEPATIC:   Lab Results   Component Value Date    ALBUMIN 3.8 02/03/2025    PROTTOTAL 7.6 02/03/2025    ALT 88 (H) 02/03/2025    AST 61 (H) 02/03/2025    ALKPHOS 69 02/03/2025    BILITOTAL 1.6 (H) 02/03/2025     OTHER:   Lab Results   Component Value Date    A1C 5.7 (H) 01/05/2021    YOANDY 9.8 03/06/2025    MAG 2.1 02/04/2025    TSH 0.70 01/27/2025       Anesthesia Plan    ASA Status:  3    NPO Status:  NPO Appropriate    Anesthesia Type: MAC.              Consents    Anesthesia Plan(s) and associated risks, benefits, and realistic alternatives discussed. Questions answered and patient/representative(s) expressed understanding.     - Discussed:     - Discussed with:  Patient      - Specific Concerns: Specific risks discussed (but not limited to): Possibility of intraoperative awareness..           Postoperative Care            Comments:               Ady Smith MD    I have reviewed the pertinent notes and labs in the chart from the past 30 days and (re)examined the patient.  Any updates or changes from those notes are reflected in this note.    Clinically Significant Risk Factors Present on Admission                   # Hypertension: Noted on problem list           # Obesity: Estimated body mass index is 38.79 kg/m  as calculated from the following:    Height as of 3/6/25: 1.626 m (5' 4\").    Weight as of 3/6/25: 102.5 kg (226 lb).       # Asthma: noted on problem list          "

## 2025-03-17 NOTE — PROGRESS NOTES
Care Suites Discharge Nursing Note    Patient Information  Name: Arline Saini  Age: 74 year old    Discharge Education:  Discharge instructions reviewed: Yes  Additional education/resources provided: yes- answered many questions from daughter  Patient/patient representative verbalizes understanding: Yes  Patient discharging on new medications: No  Medication education completed: Yes- med changes per Dr Jane    Discharge Plans:   Discharge location: home  Discharge ride contacted: Yes  Approximate discharge time: 0945    Discharge Criteria:  Discharge criteria met and vital signs stable: Yes    Patient Belongs:  Patient belongings returned to patient: Yes    Erlinda Chu RN

## 2025-03-18 LAB
ATRIAL RATE - MUSE: 64 BPM
DIASTOLIC BLOOD PRESSURE - MUSE: NORMAL MMHG
INTERPRETATION ECG - MUSE: NORMAL
P AXIS - MUSE: 47 DEGREES
PR INTERVAL - MUSE: 160 MS
QRS DURATION - MUSE: 92 MS
QT - MUSE: 438 MS
QTC - MUSE: 451 MS
R AXIS - MUSE: -15 DEGREES
SYSTOLIC BLOOD PRESSURE - MUSE: NORMAL MMHG
T AXIS - MUSE: 247 DEGREES
VENTRICULAR RATE- MUSE: 64 BPM

## 2025-03-19 LAB
ATRIAL RATE - MUSE: 78 BPM
DIASTOLIC BLOOD PRESSURE - MUSE: NORMAL MMHG
INTERPRETATION ECG - MUSE: NORMAL
P AXIS - MUSE: NORMAL DEGREES
PR INTERVAL - MUSE: NORMAL MS
QRS DURATION - MUSE: 92 MS
QT - MUSE: 324 MS
QTC - MUSE: 430 MS
R AXIS - MUSE: 3 DEGREES
SYSTOLIC BLOOD PRESSURE - MUSE: NORMAL MMHG
T AXIS - MUSE: 238 DEGREES
VENTRICULAR RATE- MUSE: 106 BPM

## 2025-03-24 ENCOUNTER — MYC REFILL (OUTPATIENT)
Dept: FAMILY MEDICINE | Facility: CLINIC | Age: 75
End: 2025-03-24
Payer: COMMERCIAL

## 2025-03-24 DIAGNOSIS — J45.40 MODERATE PERSISTENT ASTHMA WITHOUT COMPLICATION: ICD-10-CM

## 2025-03-24 DIAGNOSIS — Z00.00 ROUTINE GENERAL MEDICAL EXAMINATION AT A HEALTH CARE FACILITY: ICD-10-CM

## 2025-03-24 RX ORDER — LEVALBUTEROL INHALATION SOLUTION 1.25 MG/3ML
1.25 SOLUTION RESPIRATORY (INHALATION) EVERY 6 HOURS PRN
Qty: 90 ML | Refills: 0 | Status: SHIPPED | OUTPATIENT
Start: 2025-03-24

## 2025-03-24 RX ORDER — FLUTICASONE PROPIONATE AND SALMETEROL 500; 50 UG/1; UG/1
1 POWDER RESPIRATORY (INHALATION) EVERY 12 HOURS
Qty: 180 EACH | Refills: 0 | Status: SHIPPED | OUTPATIENT
Start: 2025-03-24

## 2025-03-25 NOTE — TELEPHONE ENCOUNTER
Refill done.    Pt due for annual physical in 6/2025 , yet to schedule / Future refills after physical.    Thank you  Amber Raya MD on 3/24/2025

## 2025-03-25 NOTE — TELEPHONE ENCOUNTER
Refill done.    Pt due for annual physical in 6/2025 , yet to schedule / Future refills after physical. Please assist pt to schedule accordingly.     Amber Raya MD on 3/24/2025

## 2025-03-27 ENCOUNTER — TELEPHONE (OUTPATIENT)
Dept: CARDIOLOGY | Facility: CLINIC | Age: 75
End: 2025-03-27
Payer: COMMERCIAL

## 2025-03-27 NOTE — TELEPHONE ENCOUNTER
[facility] Call Center    Phone Message    May a detailed message be left on voicemail: yes     Reason for Call: Other: Pt daughter called would like a call from care team asap. Stated patients BP high and then goes back to normal its been like this for the last 5-6 days. Daughter very concern and wonder if medications need to be adjusted or what needs to done. Please call daughter back to further discuss.      Action Taken: Other: Cardio     Travel Screening: Not Applicable     Date of Service:     Thank you!  [facility]

## 2025-03-28 NOTE — TELEPHONE ENCOUNTER
Spoke to pt daughter who is concerned that pt bp's are going up and down.  But this sounds to have happened before pt increased her Metoprolol to 100 mg bid about a week ago.  BP's from yesterday and today, done at random times. 129/85 HR 71, 136/92 HR 59, 137/97 HR 70 and 129/93 HR.  Before the metoprolol increase Pt woke up with a headache and BP was 156-160 systolic.   Did discuss with daughter that BP's look good on the increased dose and she did not think there had been any high bp's.  Also states that pt breathing is not good and she is SOB, legs swelling again and feeling like she did before she was out of rhythm.  Daughter feels that pt should have another visit with Labs.  Currently pt is seeing Cassie More on 4/10. Will removed Diltiazem as this was stopped at Cardioversion. Elisabeth

## 2025-03-28 NOTE — TELEPHONE ENCOUNTER
Those blood pressures all look okay to me.  I am sorry she is experiencing shortness of breath and lower extremity edema.  If there are openings prior to her visit with Dolores on 4/10, would recommend she is seen sooner for ECG and further evaluation.      Thanks, Melissa     Spoke to Jessica that Melissa thought the BP's look good, but would like pt to be seen for her SOB and LE edema.  Made her aware that will work on this Monday morning to see if there are any openings in either [place] or [place]. Jessica states understanding. JNelsonRN

## 2025-03-30 DIAGNOSIS — J45.40 MODERATE PERSISTENT ASTHMA WITHOUT COMPLICATION: ICD-10-CM

## 2025-03-31 RX ORDER — LEVALBUTEROL INHALATION SOLUTION 1.25 MG/3ML
SOLUTION RESPIRATORY (INHALATION)
Qty: 9 ML | Refills: 0 | Status: SHIPPED | OUTPATIENT
Start: 2025-03-31

## 2025-04-01 NOTE — TELEPHONE ENCOUNTER
Refill done.    Please assist pt to schedule AWV due in 6/2025    Thank you  Amber Raya MD on 3/31/2025

## 2025-04-01 NOTE — PROGRESS NOTES
Electrophysiology Clinic Progress Note  Arline Saini MRN# [id number]   YOB: 1950 Age: 74 year old     Primary cardiologist: Dr. Wlaker    Reason for visit: Shortness of breath, hypertension      Assessment & Plan:  Shortness of breath  EKG today reviewed- NSR 63 bpm  No clinical signs of fluid overload  Stable, only with exertion but able to walk a good amount before feeling short of breath  Continue to monitor  Hypertension  SBP 160s in the mornings at home- has dizziness with high BP, otherwise it will get down to 130s  139/98 today  Decrease metoprolol to 50 mg BID  Start taking losartan 25 mg   BMP prior to scheduled visit next week  Continue to monitor BP at home and bring record to visit next week  Hyperlipidemia  Patient request for lipid panel with labs next week, lipid panel and ALT ordered  Cardiomyopathy  Patient request for repeat pro-BNP next week- ordered  May want to consider repeat TTE in upcoming months now that sinus rhythm has been restored  Follow up at scheduled appt with Cassie Sawant on 4/10, earlier if experiencing new or worsening cardiac symptoms      Pertinent Detailed Problem List:  Persistent atrial fibrillation with RVR, asymptomatic  Timeline:  Diagnosed during hospitalization for influenza A infection in 1/2025  S/p DCCV on 3/17/2025  Diltiazem discontinued  Metoprolol tartrate 50 mg twice daily recommended by Dr Jane  [id number]- VASc Score: 3 (age, female, HTN)   Xarelto 20 mg daily  Hypertension  Previously on losartan 100 mg daily, amlodipine 5 mg daily, spironolactone 100 mg daily that was discontinued during hospitalization in 1/2025  Hyperlipidemia  Simvastatin 10 mg daily  Within goal  Cardiomyopathy with mildly reduced EF  In the setting of acute influenza A infection and atrial fibrillation RVR  Lasix 10 mg QD  TTE on 1/28/25- LVEF 50-55%, no RWMA  Previously normal LVEF in 2019  Ascending aortic dilation  Stable at 4.0 cm on TTE  25  Asthma    HPI:  Arline Saini is a pleasant 74 year old patient who presents today for shortness of breath and hypertension. Her daughter is here and preferred to interpret for patient. She reports her mom has been taking metoprolol 50 QAM and 100 at bedtime. SBP 160s in AM and sometimes 130s/90s. She feels dizzy with the high BPs. Her daughter reports her blood pressure was previously well-controlled prior to her hospitalization and was wondering which medications were discontinued. She reports shortness of breath with exertion, but is able to walk a good distance before feeling short of breath. She sometimes has some chest discomfort with lying down, although is quickly resolved. Has lower extremity edema on days she has been very active and up a lot. Her daughter is very concerned about her heart failure and kidney disease and would like labs done next week before her visit.  Denies Syncope, Palpitations, Dizziness/lightheadedness, Chest pain, and Pre-syncope.      Cammie To PA-C   Physician Assistant   [facility]     PAST SURGICAL HISTORY:   Past Surgical History:   Procedure Laterality Date    ANESTHESIA CARDIOVERSION N/A 3/17/2025    Procedure: Anesthesia cardioversion;  Surgeon: GENERIC ANESTHESIA PROVIDER;  Location:  OR    CHOLECYSTECTOMY         FAMILY HISTORY:   Family History   Problem Relation Age of Onset    Cerebrovascular Disease Mother     Hypertension Mother     Hyperlipidemia Mother     Obesity Mother     Cerebrovascular Disease Sister 40    Unknown/Adopted Father        SOCIAL HISTORY:   Social History     Tobacco Use    Smoking status: Former     Current packs/day: 0.00     Types: Cigarettes     Quit date: 2018     Years since quittin.0    Smokeless tobacco: Never    Tobacco comments:     Smoked a few cigarettes every day for 40 years.   Substance Use Topics    Alcohol use: No           Objective:    Vitals: BP (!) 139/98   Pulse 64   Ht  "1.6 m (5' 3\")   Wt 102.5 kg (226 lb)   BMI 40.03 kg/m      Physical Exam:  General: Awake and alert. No acute distress.  Skin: Warm and dry. Appropriate color. No lesions or rashes noted.  HEENT: Normocephalic and atraumatic. EOM intact. PERRL. Trachea midline. Right-sided JVD: none.  Cardiac: Normal S1 and S2, no murmurs, rubs, or gallops noted. Regular rate and rhythm.  Lung: Regular work of breathing without accessory muscle use. Clear to auscultation bilaterally.  Abdomen: No distension.  Extremities: Bilateral lower extremity edema: none.  Neuro: A & O. No focal deficits noted. Moves all extremities appropriately.      CURRENT MEDICATIONS:  Current Outpatient Medications   Medication Sig Dispense Refill    fluticasone-salmeterol (ADVAIR DISKUS) 500-50 MCG/ACT inhaler Inhale 1 puff into the lungs every 12 hours. 180 each 0    furosemide (LASIX) 20 MG tablet Take 0.5 tablets (10 mg) by mouth daily. 45 tablet 3    levalbuterol (XOPENEX) 1.25 MG/3ML neb solution USE 1 VIAL VIA NEBULIZER EVERY 6 HOURS AS NEEDED FOR WHEEZING 9 mL 0    levalbuterol (XOPENEX) 1.25 MG/3ML neb solution Take 3 mLs (1.25 mg) by nebulization 2 times daily. 180 mL 0    losartan (COZAAR) 25 MG tablet Take 1 tablet (25 mg) by mouth daily. 90 tablet 3    metoprolol tartrate (LOPRESSOR) 50 MG tablet Take 1.5 tablets (75 mg) by mouth 2 times daily. 270 tablet 3    rivaroxaban ANTICOAGULANT (XARELTO) 20 MG TABS tablet Take 1 tablet (20 mg) by mouth daily (with breakfast). 90 tablet 3    simvastatin (ZOCOR) 10 MG tablet Take 1 tablet (10 mg) by mouth at bedtime. 90 tablet 1       ALLERGIES:  No Known Allergies      PAST MEDICAL HISTORY:  Past Medical History:   Diagnosis Date    Essential hypertension 2/27/2019    Obesity (BMI 35.0-39.9) with comorbidity (H) 2/27/2019       PAST SURGICAL HISTORY:  Past Surgical History:   Procedure Laterality Date    ANESTHESIA CARDIOVERSION N/A 3/17/2025    Procedure: Anesthesia cardioversion;  Surgeon: GENERIC " ANESTHESIA PROVIDER;  Location:  OR    CHOLECYSTECTOMY         FAMILY HISTORY:  Family History   Problem Relation Age of Onset    Cerebrovascular Disease Mother     Hypertension Mother     Hyperlipidemia Mother     Obesity Mother     Cerebrovascular Disease Sister 40    Unknown/Adopted Father        SOCIAL HISTORY:  Social History     Socioeconomic History    Marital status:      Spouse name: None    Number of children: None    Years of education: None    Highest education level: None   Tobacco Use    Smoking status: Former     Current packs/day: 0.00     Types: Cigarettes     Quit date: 2018     Years since quittin.0    Smokeless tobacco: Never    Tobacco comments:     Smoked a few cigarettes every day for 40 years.   Vaping Use    Vaping status: Never Used   Substance and Sexual Activity    Alcohol use: No    Drug use: No    Sexual activity: Never   Other Topics Concern    Parent/sibling w/ CABG, MI or angioplasty before 65F 55M? Yes     Comment: Sister  of stroke at 50     Social Drivers of Health     Financial Resource Strain: Low Risk  (2025)    Financial Resource Strain     Within the past 12 months, have you or your family members you live with been unable to get utilities (heat, electricity) when it was really needed?: No   Food Insecurity: Low Risk  (2025)    Food Insecurity     Within the past 12 months, did you worry that your food would run out before you got money to buy more?: No     Within the past 12 months, did the food you bought just not last and you didn t have money to get more?: No   Transportation Needs: Low Risk  (2025)    Transportation Needs     Within the past 12 months, has lack of transportation kept you from medical appointments, getting your medicines, non-medical meetings or appointments, work, or from getting things that you need?: No   Physical Activity: Inactive (2024)    Exercise Vital Sign     Days of Exercise per Week: 0 days      Minutes of Exercise per Session: 0 min   Stress: No Stress Concern Present (6/5/2024)    [employer] [place] [employer] - Occupational Stress Questionnaire     Feeling of Stress : Only a little   Social Connections: Unknown (6/5/2024)    Social Connection and Isolation Panel [NHANES]     Frequency of Social Gatherings with Friends and Family: Three times a week   Interpersonal Safety: Low Risk  (3/17/2025)    Interpersonal Safety     Do you feel physically and emotionally safe where you currently live?: Yes     Within the past 12 months, have you been hit, slapped, kicked or otherwise physically hurt by someone?: No     Within the past 12 months, have you been humiliated or emotionally abused in other ways by your partner or ex-partner?: No   Housing Stability: Low Risk  (1/29/2025)    Housing Stability     Do you have housing? : Yes     Are you worried about losing your housing?: No       Today's clinic visit entailed:  40 minutes spent by me on the date of the encounter doing chart review, history and exam, documentation and further activities per the note.  The level of medical decision making during this visit was of high complexity.     The longitudinal plan of care for the diagnosis(es)/condition(s) as documented were addressed during this visit. Due to the added complexity in care, I will continue to support Arline Saini in the subsequent management and with ongoing continuity of care.         Review of Systems:     Review of Systems:  Skin:        Eyes:       ENT:       Respiratory:  Positive for shortness of breath  Cardiovascular:    Positive for, edema  Gastroenterology:      Genitourinary:       Musculoskeletal:       Neurologic:       Psychiatric:       Heme/Lymph/Imm:       Endocrine:

## 2025-04-02 ENCOUNTER — OFFICE VISIT (OUTPATIENT)
Dept: CARDIOLOGY | Facility: CLINIC | Age: 75
End: 2025-04-02
Payer: COMMERCIAL

## 2025-04-02 VITALS
BODY MASS INDEX: 40.04 KG/M2 | WEIGHT: 226 LBS | SYSTOLIC BLOOD PRESSURE: 139 MMHG | HEIGHT: 63 IN | DIASTOLIC BLOOD PRESSURE: 98 MMHG | HEART RATE: 64 BPM

## 2025-04-02 DIAGNOSIS — I48.91 ATRIAL FIBRILLATION WITH RAPID VENTRICULAR RESPONSE (H): Primary | ICD-10-CM

## 2025-04-02 DIAGNOSIS — Z51.81 ENCOUNTER FOR MONITORING STATIN THERAPY: ICD-10-CM

## 2025-04-02 DIAGNOSIS — E78.5 DYSLIPIDEMIA: ICD-10-CM

## 2025-04-02 DIAGNOSIS — I10 ESSENTIAL HYPERTENSION: ICD-10-CM

## 2025-04-02 DIAGNOSIS — I50.21 ACUTE SYSTOLIC HEART FAILURE (H): ICD-10-CM

## 2025-04-02 DIAGNOSIS — I42.9 CARDIOMYOPATHY, UNSPECIFIED TYPE (H): ICD-10-CM

## 2025-04-02 DIAGNOSIS — Z79.899 ENCOUNTER FOR MONITORING STATIN THERAPY: ICD-10-CM

## 2025-04-02 PROCEDURE — 93000 ELECTROCARDIOGRAM COMPLETE: CPT

## 2025-04-02 PROCEDURE — G2211 COMPLEX E/M VISIT ADD ON: HCPCS

## 2025-04-02 PROCEDURE — 99215 OFFICE O/P EST HI 40 MIN: CPT

## 2025-04-02 RX ORDER — LOSARTAN POTASSIUM 25 MG/1
25 TABLET ORAL DAILY
Qty: 90 TABLET | Refills: 3 | Status: SHIPPED | OUTPATIENT
Start: 2025-04-02

## 2025-04-02 NOTE — PATIENT INSTRUCTIONS
It was a pleasure seeing you today!    Today's Recommendations    Medication changes:  Decrease metoprolol to 50 mg twice daily  Start losartan 25 mg daily  Fasting labs prior to visit next week: Lipid panel, ALT, NT-proBNP, BMP  Monitor blood pressure at home  Follow up at scheduled appointment with Cassie Sawant on 4/10, earlier if experiencing worsening or new cardiac symptoms.     Please send a [employer] message or call our team with any questions or concerns.     Cammie To PA-C     Electrophysiology (EP) Nurse: [phone]  Device Nurse: [phone]  General scheduling and after hours: [phone]  Electrophysiology (EP) scheduling [phone]

## 2025-04-02 NOTE — LETTER
4/2/2025    [facility]  [address] [address]    RE: Arline Saini       Dear Colleague,     I had the pleasure of seeing Arline Saini in the [facility].      Electrophysiology Clinic Progress Note  Arline Saini MRN# [id number]   YOB: 1950 Age: 74 year old     Primary cardiologist: Dr. Walker    Reason for visit: Shortness of breath, hypertension      Assessment & Plan:  Shortness of breath  EKG today reviewed- NSR 63 bpm  No clinical signs of fluid overload  Stable, only with exertion but able to walk a good amount before feeling short of breath  Continue to monitor  Hypertension  SBP 160s in the mornings at home- has dizziness with high BP, otherwise it will get down to 130s  139/98 today  Decrease metoprolol to 50 mg BID  Start taking losartan 25 mg   BMP prior to scheduled visit next week  Continue to monitor BP at home and bring record to visit next week  Hyperlipidemia  Patient request for lipid panel with labs next week, lipid panel and ALT ordered  Cardiomyopathy  Patient request for repeat pro-BNP next week- ordered  May want to consider repeat TTE in upcoming months now that sinus rhythm has been restored  Follow up at scheduled appt with Cassie Sawant on 4/10, earlier if experiencing new or worsening cardiac symptoms      Pertinent Detailed Problem List:  Persistent atrial fibrillation with RVR, asymptomatic  Timeline:  Diagnosed during hospitalization for influenza A infection in 1/2025  S/p DCCV on 3/17/2025  Diltiazem discontinued  Metoprolol tartrate 50 mg twice daily recommended by Dr Jane  [id number]- VASc Score: 3 (age, female, HTN)   Xarelto 20 mg daily  Hypertension  Previously on losartan 100 mg daily, amlodipine 5 mg daily, spironolactone 100 mg daily that was discontinued during hospitalization in 1/2025  Hyperlipidemia  Simvastatin 10 mg daily  Within goal  Cardiomyopathy with mildly reduced EF  In the setting  of acute influenza A infection and atrial fibrillation RVR  Lasix 10 mg QD  TTE on 25- LVEF 50-55%, no RWMA  Previously normal LVEF in 2019  Ascending aortic dilation  Stable at 4.0 cm on TTE 25  Asthma    HPI:  Arline Saini is a pleasant 74 year old patient who presents today for shortness of breath and hypertension. Her daughter is here and preferred to interpret for patient. She reports her mom has been taking metoprolol 50 QAM and 100 at bedtime. SBP 160s in AM and sometimes 130s/90s. She feels dizzy with the high BPs. Her daughter reports her blood pressure was previously well-controlled prior to her hospitalization and was wondering which medications were discontinued. She reports shortness of breath with exertion, but is able to walk a good distance before feeling short of breath. She sometimes has some chest discomfort with lying down, although is quickly resolved. Has lower extremity edema on days she has been very active and up a lot. Her daughter is very concerned about her heart failure and kidney disease and would like labs done next week before her visit.  Denies Syncope, Palpitations, Dizziness/lightheadedness, Chest pain, and Pre-syncope.      Cammie To PA-C   Physician Assistant   [facility]     PAST SURGICAL HISTORY:   Past Surgical History:   Procedure Laterality Date     ANESTHESIA CARDIOVERSION N/A 3/17/2025    Procedure: Anesthesia cardioversion;  Surgeon: GENERIC ANESTHESIA PROVIDER;  Location:  OR     CHOLECYSTECTOMY         FAMILY HISTORY:   Family History   Problem Relation Age of Onset     Cerebrovascular Disease Mother      Hypertension Mother      Hyperlipidemia Mother      Obesity Mother      Cerebrovascular Disease Sister 40     Unknown/Adopted Father        SOCIAL HISTORY:   Social History     Tobacco Use     Smoking status: Former     Current packs/day: 0.00     Types: Cigarettes     Quit date: 2018     Years since quittin.0  "    Smokeless tobacco: Never     Tobacco comments:     Smoked a few cigarettes every day for 40 years.   Substance Use Topics     Alcohol use: No           Objective:    Vitals: BP (!) 139/98   Pulse 64   Ht 1.6 m (5' 3\")   Wt 102.5 kg (226 lb)   BMI 40.03 kg/m      Physical Exam:  General: Awake and alert. No acute distress.  Skin: Warm and dry. Appropriate color. No lesions or rashes noted.  HEENT: Normocephalic and atraumatic. EOM intact. PERRL. Trachea midline. Right-sided JVD: none.  Cardiac: Normal S1 and S2, no murmurs, rubs, or gallops noted. Regular rate and rhythm.  Lung: Regular work of breathing without accessory muscle use. Clear to auscultation bilaterally.  Abdomen: No distension.  Extremities: Bilateral lower extremity edema: none.  Neuro: A & O. No focal deficits noted. Moves all extremities appropriately.      CURRENT MEDICATIONS:  Current Outpatient Medications   Medication Sig Dispense Refill     fluticasone-salmeterol (ADVAIR DISKUS) 500-50 MCG/ACT inhaler Inhale 1 puff into the lungs every 12 hours. 180 each 0     furosemide (LASIX) 20 MG tablet Take 0.5 tablets (10 mg) by mouth daily. 45 tablet 3     levalbuterol (XOPENEX) 1.25 MG/3ML neb solution USE 1 VIAL VIA NEBULIZER EVERY 6 HOURS AS NEEDED FOR WHEEZING 9 mL 0     levalbuterol (XOPENEX) 1.25 MG/3ML neb solution Take 3 mLs (1.25 mg) by nebulization 2 times daily. 180 mL 0     losartan (COZAAR) 25 MG tablet Take 1 tablet (25 mg) by mouth daily. 90 tablet 3     metoprolol tartrate (LOPRESSOR) 50 MG tablet Take 1.5 tablets (75 mg) by mouth 2 times daily. 270 tablet 3     rivaroxaban ANTICOAGULANT (XARELTO) 20 MG TABS tablet Take 1 tablet (20 mg) by mouth daily (with breakfast). 90 tablet 3     simvastatin (ZOCOR) 10 MG tablet Take 1 tablet (10 mg) by mouth at bedtime. 90 tablet 1       ALLERGIES:  No Known Allergies      PAST MEDICAL HISTORY:  Past Medical History:   Diagnosis Date     Essential hypertension 2/27/2019     Obesity (BMI " 35.0-39.9) with comorbidity (H) 2019       PAST SURGICAL HISTORY:  Past Surgical History:   Procedure Laterality Date     ANESTHESIA CARDIOVERSION N/A 3/17/2025    Procedure: Anesthesia cardioversion;  Surgeon: GENERIC ANESTHESIA PROVIDER;  Location: [facility] OR     CHOLECYSTECTOMY         FAMILY HISTORY:  Family History   Problem Relation Age of Onset     Cerebrovascular Disease Mother      Hypertension Mother      Hyperlipidemia Mother      Obesity Mother      Cerebrovascular Disease Sister 40     Unknown/Adopted Father        SOCIAL HISTORY:  Social History     Socioeconomic History     Marital status:      Spouse name: None     Number of children: None     Years of education: None     Highest education level: None   Tobacco Use     Smoking status: Former     Current packs/day: 0.00     Types: Cigarettes     Quit date: 2018     Years since quittin.0     Smokeless tobacco: Never     Tobacco comments:     Smoked a few cigarettes every day for 40 years.   Vaping Use     Vaping status: Never Used   Substance and Sexual Activity     Alcohol use: No     Drug use: No     Sexual activity: Never   Other Topics Concern     Parent/sibling w/ CABG, MI or angioplasty before 65F 55M? Yes     Comment: Sister  of stroke at 50     Social Drivers of Health     Financial Resource Strain: Low Risk  (2025)    Financial Resource Strain      Within the past 12 months, have you or your family members you live with been unable to get utilities (heat, electricity) when it was really needed?: No   Food Insecurity: Low Risk  (2025)    Food Insecurity      Within the past 12 months, did you worry that your food would run out before you got money to buy more?: No      Within the past 12 months, did the food you bought just not last and you didn t have money to get more?: No   Transportation Needs: Low Risk  (2025)    Transportation Needs      Within the past 12 months, has lack of transportation kept you  from medical appointments, getting your medicines, non-medical meetings or appointments, work, or from getting things that you need?: No   Physical Activity: Inactive (6/5/2024)    Exercise Vital Sign      Days of Exercise per Week: 0 days      Minutes of Exercise per Session: 0 min   Stress: No Stress Concern Present (6/5/2024)    [employer] - Occupational Stress Questionnaire      Feeling of Stress : Only a little   Social Connections: Unknown (6/5/2024)    Social Connection and Isolation Panel [NHANES]      Frequency of Social Gatherings with Friends and Family: Three times a week   Interpersonal Safety: Low Risk  (3/17/2025)    Interpersonal Safety      Do you feel physically and emotionally safe where you currently live?: Yes      Within the past 12 months, have you been hit, slapped, kicked or otherwise physically hurt by someone?: No      Within the past 12 months, have you been humiliated or emotionally abused in other ways by your partner or ex-partner?: No   Housing Stability: Low Risk  (1/29/2025)    Housing Stability      Do you have housing? : Yes      Are you worried about losing your housing?: No       Today's clinic visit entailed:  40 minutes spent by me on the date of the encounter doing chart review, history and exam, documentation and further activities per the note.  The level of medical decision making during this visit was of high complexity.     The longitudinal plan of care for the diagnosis(es)/condition(s) as documented were addressed during this visit. Due to the added complexity in care, I will continue to support Arline Saini in the subsequent management and with ongoing continuity of care.         Review of Systems:     Review of Systems:  Skin:        Eyes:       ENT:       Respiratory:  Positive for shortness of breath  Cardiovascular:    Positive for, edema  Gastroenterology:      Genitourinary:       Musculoskeletal:       Neurologic:        Psychiatric:       Heme/Lymph/Imm:       Endocrine:                 Thank you for allowing me to participate in the care of your patient.      Sincerely,     SAHNTANU Strickland [facility]  cc:   No referring provider defined for this encounter.

## 2025-04-09 ENCOUNTER — LAB (OUTPATIENT)
Dept: LAB | Facility: CLINIC | Age: 75
End: 2025-04-09
Payer: COMMERCIAL

## 2025-04-09 DIAGNOSIS — Z51.81 ENCOUNTER FOR MONITORING STATIN THERAPY: ICD-10-CM

## 2025-04-09 DIAGNOSIS — I10 ESSENTIAL HYPERTENSION: ICD-10-CM

## 2025-04-09 DIAGNOSIS — I48.91 ATRIAL FIBRILLATION WITH RAPID VENTRICULAR RESPONSE (H): ICD-10-CM

## 2025-04-09 DIAGNOSIS — E78.5 DYSLIPIDEMIA: ICD-10-CM

## 2025-04-09 DIAGNOSIS — Z79.899 ENCOUNTER FOR MONITORING STATIN THERAPY: ICD-10-CM

## 2025-04-09 DIAGNOSIS — I42.9 CARDIOMYOPATHY, UNSPECIFIED TYPE (H): ICD-10-CM

## 2025-04-09 LAB
ALT SERPL W P-5'-P-CCNC: 29 U/L (ref 0–50)
ANION GAP SERPL CALCULATED.3IONS-SCNC: 13 MMOL/L (ref 7–15)
BUN SERPL-MCNC: 27 MG/DL (ref 8–23)
CALCIUM SERPL-MCNC: 9.6 MG/DL (ref 8.8–10.4)
CHLORIDE SERPL-SCNC: 106 MMOL/L (ref 98–107)
CHOLEST SERPL-MCNC: 168 MG/DL
CREAT SERPL-MCNC: 1.04 MG/DL (ref 0.51–0.95)
EGFRCR SERPLBLD CKD-EPI 2021: 56 ML/MIN/1.73M2
FASTING STATUS PATIENT QL REPORTED: YES
FASTING STATUS PATIENT QL REPORTED: YES
GLUCOSE SERPL-MCNC: 102 MG/DL (ref 70–99)
HCO3 SERPL-SCNC: 20 MMOL/L (ref 22–29)
HDLC SERPL-MCNC: 72 MG/DL
LDLC SERPL CALC-MCNC: 83 MG/DL
NONHDLC SERPL-MCNC: 96 MG/DL
NT-PROBNP SERPL-MCNC: 665 PG/ML (ref 0–900)
POTASSIUM SERPL-SCNC: 4.1 MMOL/L (ref 3.4–5.3)
SODIUM SERPL-SCNC: 139 MMOL/L (ref 135–145)
TRIGL SERPL-MCNC: 64 MG/DL

## 2025-04-09 PROCEDURE — 83880 ASSAY OF NATRIURETIC PEPTIDE: CPT

## 2025-04-09 PROCEDURE — 36415 COLL VENOUS BLD VENIPUNCTURE: CPT

## 2025-04-09 PROCEDURE — 80061 LIPID PANEL: CPT

## 2025-04-09 PROCEDURE — 80048 BASIC METABOLIC PNL TOTAL CA: CPT

## 2025-04-09 PROCEDURE — 84460 ALANINE AMINO (ALT) (SGPT): CPT

## 2025-04-10 ENCOUNTER — TELEPHONE (OUTPATIENT)
Dept: CARDIOLOGY | Facility: CLINIC | Age: 75
End: 2025-04-10

## 2025-04-10 ENCOUNTER — OFFICE VISIT (OUTPATIENT)
Dept: CARDIOLOGY | Facility: CLINIC | Age: 75
End: 2025-04-10
Attending: INTERNAL MEDICINE
Payer: COMMERCIAL

## 2025-04-10 VITALS
WEIGHT: 226.6 LBS | HEIGHT: 64 IN | DIASTOLIC BLOOD PRESSURE: 94 MMHG | HEART RATE: 65 BPM | OXYGEN SATURATION: 97 % | BODY MASS INDEX: 38.68 KG/M2 | SYSTOLIC BLOOD PRESSURE: 144 MMHG

## 2025-04-10 DIAGNOSIS — I50.31 ACUTE DIASTOLIC CONGESTIVE HEART FAILURE (H): Primary | ICD-10-CM

## 2025-04-10 DIAGNOSIS — I48.91 ATRIAL FIBRILLATION WITH RAPID VENTRICULAR RESPONSE (H): ICD-10-CM

## 2025-04-10 RX ORDER — METOPROLOL TARTRATE 50 MG
50 TABLET ORAL 2 TIMES DAILY
Qty: 180 TABLET | Refills: 3 | Status: SHIPPED | OUTPATIENT
Start: 2025-04-10

## 2025-04-10 RX ORDER — SACUBITRIL AND VALSARTAN 24; 26 MG/1; MG/1
1 TABLET, FILM COATED ORAL 2 TIMES DAILY
Qty: 60 TABLET | Refills: 11 | Status: SHIPPED | OUTPATIENT
Start: 2025-04-10

## 2025-04-10 NOTE — PROGRESS NOTES
"  Cardiology Clinic Progress Note    Service Date: April 10, 2025    Primary Cardiologist: Dr. Walker      Reason for Visit: HTN, CM, afib     HPI:   Arline Saini is a delightful 74-year-old Nigerian-speaking woman (daughter interpreting for family request) with PMH significant for the followin.  Hypertension  2.  Longstanding intermittent peripheral edema  3.  Hyperlipidemia  4.  Mild cardiomyopathy with EF 50 to 55% in the context of A-fib with RVR  5.  Paroxysmal atrial fibrillation with episode of RVR in 2025 in the context of influenza A, status post cardioversion.  6.  Enlarged ascending aorta at 4.0 cm.  7.  Mild MR  8.  Asthma    It is my pleasure to meet Arline in clinic today and I appreciate her daughter Leslie mujica.  She was hospitalized this winter for influenza A developed A-fib with RVR and underwent cardioversion.  She was seen last week by Ms. Xi PA-C and was in sinus rhythm but remained hypertensive.  Given her she was relatively bradycardic her metoprolol was decreased to 50 mg twice daily and she was started on losartan 25 mg daily.  Apparently the daughter also had insisted that the diuretic be decreased at some point.    She comes in today and has worse peripheral edema, this makes her legs hurt.  It is basically her ankles to mid shin.  She continues to be short of breath and has chest heaviness when she is lies down.  Compression stockings have not been helpful.  Home blood pressures are in the 130s over 80s.  They note that they had her blood pressure well-controlled before medications were adjusted in the hospital previously had some success with spironolactone.    Social History:  Former smoker quit in 2018 with just a few cigarettes a day for over 40 years.  No alcohol use.      Physical Exam:  BP (!) 144/94   Pulse 65   Ht 1.626 m (5' 4\")   Wt 102.8 kg (226 lb 9.6 oz)   SpO2 97%   BMI 38.90 kg/m     Well-developed well-nourished woman in no acute " distress.  Normocephalic/atraumatic.  Heart is regular rate rhythm, I do not appreciate murmur rub or gallop.  Lungs are clear without wheezes rales or rhonchi.  Extremities with 1+ pitting edema to midshin, varicosities noted bilaterally.    Data reviewed:  Echocardiogram, BMP    Assessment and Plan:  1.  Paroxysmal atrial fibrillation status post cardioversion maintaining sinus rhythm with a [id number] of 3 for age hypertension and female gender.  Will continue her on Xarelto 20 mg daily and metoprolol 50 mg twice daily.    2.  Mild cardiomyopathy with EF 50 to 55% but in the context of A-fib with a heart rates over 100.  She has some heart failure with preserved ejection fraction as well and endorses some symptoms of orthopnea as well as ongoing peripheral edema.  Ideally would put her on Entresto 24/26 mg twice daily in place of losartan.  Hopeful that this is not cost prohibitive.  If it is my recommendation would be to continue losartan and add spironolactone 50 mg daily.  Will keep her on Lasix 10 mg daily now regardless of which med change we make.  Currently class III stage C heart failure.  Could consider Jardiance as well depending on cost issues.  She should get a repeat echocardiogram in sinus rhythm later this year.    3.  Hypertension elevated, should improve with Entresto likely contributing to fluid retention.    4.  Likely venous insufficiency contributing all above, apparently compression stockings have not been helpful.    5.  Dyslipidemia on simvastatin 10 mg daily no documented coronary disease although there is mild coronary calcification on her CT of her chest goal LDL should be at least less than 100 she is at LDL 83 HDL 72 total cholesterol 168 will continue simvastatin for now could switch to atorvastatin or Crestor down the road.    Thank you for allowing me to precipitate in this delightful patient's care she will get labs in about 2 weeks and follow-up in clinic in about 3 weeks,  sooner with concerns.    Cassie Sawant PA-C  [facility]     This note was written using Dragon voice recognition system, please excuse any misspelled names, or nonsensical words and call with any questions.      The longitudinal plan of care for the diagnosis(es)/condition(s) as documented were addressed during this visit. Due to the added complexity in care, I will continue to support Arline in the subsequent management and with ongoing continuity of care.      Orders this visit:  Orders Placed This Encounter   Procedures    Follow-Up with Cardiology PRICILLA     Orders Placed This Encounter   Medications    metoprolol tartrate (LOPRESSOR) 50 MG tablet     Sig: Take 1 tablet (50 mg) by mouth 2 times daily.     Dispense:  180 tablet     Refill:  3     Dose adjustment only- DO NOT FILL!    sacubitril-valsartan (ENTRESTO) 24-26 MG per tablet     Sig: Take 1 tablet by mouth 2 times daily.     Dispense:  60 tablet     Refill:  11     Medications Discontinued During This Encounter   Medication Reason    metoprolol tartrate (LOPRESSOR) 50 MG tablet     losartan (COZAAR) 25 MG tablet      Encounter Diagnoses   Name Primary?    Atrial fibrillation with rapid ventricular response (H)     Acute diastolic congestive heart failure (H) Yes       Current Medications:  Current Outpatient Medications   Medication Sig Dispense Refill    fluticasone-salmeterol (ADVAIR DISKUS) 500-50 MCG/ACT inhaler Inhale 1 puff into the lungs every 12 hours. 180 each 0    furosemide (LASIX) 20 MG tablet Take 0.5 tablets (10 mg) by mouth daily. 45 tablet 3    levalbuterol (XOPENEX) 1.25 MG/3ML neb solution USE 1 VIAL VIA NEBULIZER EVERY 6 HOURS AS NEEDED FOR WHEEZING 9 mL 0    levalbuterol (XOPENEX) 1.25 MG/3ML neb solution Take 3 mLs (1.25 mg) by nebulization 2 times daily. 180 mL 0    metoprolol tartrate (LOPRESSOR) 50 MG tablet Take 1 tablet (50 mg) by mouth 2 times daily. 180 tablet 3    rivaroxaban ANTICOAGULANT (XARELTO) 20 MG TABS  tablet Take 1 tablet (20 mg) by mouth daily (with breakfast). 90 tablet 3    sacubitril-valsartan (ENTRESTO) 24-26 MG per tablet Take 1 tablet by mouth 2 times daily. 60 tablet 11    simvastatin (ZOCOR) 10 MG tablet Take 1 tablet (10 mg) by mouth at bedtime. 90 tablet 1       Allergies Reviewed and Updated:  No Known Allergies    Review of Systems:  Skin:  not assessed     Eyes:  Positive for glasses  ENT:  not assessed    Respiratory:  Negative shortness of breath, dyspnea on exertion  Cardiovascular:  Negative, palpitations, chest pain, dizziness, lightheadedness Positive for, edema  Gastroenterology: not assessed    Genitourinary:  not assessed    Musculoskeletal:  not assessed    Neurologic:  not assessed    Psychiatric:  not assessed    Heme/Lymph/Imm:  Negative allergies  Endocrine:  Negative       Pertinent Past Medical, Surgical and Family History Reviewed and noted above.     CC  Melissa Alvarez PACORDELIA  [address] [place] [address]

## 2025-04-10 NOTE — PATIENT INSTRUCTIONS
Thank you for visiting with me today.    We discussed:   We'll adjust medications to help with the leg swelling and shortness of breath.      Medication changes:   If it's affordable start taking Entresto 24/26 mg twice a day and stop losartan.      If it's too expensive, please call me and we'll come up with a different plan.      Next Steps:   Clinic visit in about 3 weeks, labs in about 2 weeks.      Please call my nurses, Donna Shipley Stacy, or Inessa at [phone] with questions.      *If you have concerns after hours, please call [phone], option 2 to speak with on call Cardiologist.

## 2025-04-10 NOTE — LETTER
4/10/2025    [place] [id number] [facility] [address]    RE: Arline Saini       Dear Colleague,     I had the pleasure of seeing Arline Saini in the [facility].    Cardiology Clinic Progress Note    Service Date: April 10, 2025    Primary Cardiologist: Dr. Walker      Reason for Visit: HTN, CM, afib     HPI:   Arline Saini is a delightful 74-year-old [place]ese-speaking woman (daughter interpreting for family request) with PMH significant for the followin.  Hypertension  2.  Longstanding intermittent peripheral edema  3.  Hyperlipidemia  4.  Mild cardiomyopathy with EF 50 to 55% in the context of A-fib with RVR  5.  Paroxysmal atrial fibrillation with episode of RVR in 2025 in the context of influenza A, status post cardioversion.  6.  Enlarged ascending aorta at 4.0 cm.  7.  Mild MR  8.  Asthma    It is my pleasure to meet Arline in clinic today and I appreciate her daughter Leslie mujica.  She was hospitalized this winter for influenza A developed A-fib with RVR and underwent cardioversion.  She was seen last week by Ms. Xi PA-C and was in sinus rhythm but remained hypertensive.  Given her she was relatively bradycardic her metoprolol was decreased to 50 mg twice daily and she was started on losartan 25 mg daily.  Apparently the daughter also had insisted that the diuretic be decreased at some point.    She comes in today and has worse peripheral edema, this makes her legs hurt.  It is basically her ankles to mid shin.  She continues to be short of breath and has chest heaviness when she is lies down.  Compression stockings have not been helpful.  Home blood pressures are in the 130s over 80s.  They note that they had her blood pressure well-controlled before medications were adjusted in the hospital previously had some success with spironolactone.    Social History:  Former smoker quit in 2018 with just a few cigarettes a  "day for over 40 years.  No alcohol use.      Physical Exam:  BP (!) 144/94   Pulse 65   Ht 1.626 m (5' 4\")   Wt 102.8 kg (226 lb 9.6 oz)   SpO2 97%   BMI 38.90 kg/m     Well-developed well-nourished woman in no acute distress.  Normocephalic/atraumatic.  Heart is regular rate rhythm, I do not appreciate murmur rub or gallop.  Lungs are clear without wheezes rales or rhonchi.  Extremities with 1+ pitting edema to midshin, varicosities noted bilaterally.    Data reviewed:  Echocardiogram, BMP    Assessment and Plan:  1.  Paroxysmal atrial fibrillation status post cardioversion maintaining sinus rhythm with a [id number] of 3 for age hypertension and female gender.  Will continue her on Xarelto 20 mg daily and metoprolol 50 mg twice daily.    2.  Mild cardiomyopathy with EF 50 to 55% but in the context of A-fib with a heart rates over 100.  She has some heart failure with preserved ejection fraction as well and endorses some symptoms of orthopnea as well as ongoing peripheral edema.  Ideally would put her on Entresto 24/26 mg twice daily in place of losartan.  Hopeful that this is not cost prohibitive.  If it is my recommendation would be to continue losartan and add spironolactone 50 mg daily.  Will keep her on Lasix 10 mg daily now regardless of which med change we make.  Currently class III stage C heart failure.  Could consider Jardiance as well depending on cost issues.  She should get a repeat echocardiogram in sinus rhythm later this year.    3.  Hypertension elevated, should improve with Entresto likely contributing to fluid retention.    4.  Likely venous insufficiency contributing all above, apparently compression stockings have not been helpful.    5.  Dyslipidemia on simvastatin 10 mg daily no documented coronary disease although there is mild coronary calcification on her CT of her chest goal LDL should be at least less than 100 she is at LDL 83 HDL 72 total cholesterol 168 will continue simvastatin " for now could switch to atorvastatin or Crestor down the road.    Thank you for allowing me to precipitate in this delightful patient's care she will get labs in about 2 weeks and follow-up in clinic in about 3 weeks, sooner with concerns.    Cassie Sawant PA-C  [facility]     This note was written using Dragon voice recognition system, please excuse any misspelled names, or nonsensical words and call with any questions.      The longitudinal plan of care for the diagnosis(es)/condition(s) as documented were addressed during this visit. Due to the added complexity in care, I will continue to support Arline in the subsequent management and with ongoing continuity of care.      Orders this visit:  Orders Placed This Encounter   Procedures     Follow-Up with Cardiology PRICILLA     Orders Placed This Encounter   Medications     metoprolol tartrate (LOPRESSOR) 50 MG tablet     Sig: Take 1 tablet (50 mg) by mouth 2 times daily.     Dispense:  180 tablet     Refill:  3     Dose adjustment only- DO NOT FILL!     sacubitril-valsartan (ENTRESTO) 24-26 MG per tablet     Sig: Take 1 tablet by mouth 2 times daily.     Dispense:  60 tablet     Refill:  11     Medications Discontinued During This Encounter   Medication Reason     metoprolol tartrate (LOPRESSOR) 50 MG tablet      losartan (COZAAR) 25 MG tablet      Encounter Diagnoses   Name Primary?     Atrial fibrillation with rapid ventricular response (H)      Acute diastolic congestive heart failure (H) Yes       Current Medications:  Current Outpatient Medications   Medication Sig Dispense Refill     fluticasone-salmeterol (ADVAIR DISKUS) 500-50 MCG/ACT inhaler Inhale 1 puff into the lungs every 12 hours. 180 each 0     furosemide (LASIX) 20 MG tablet Take 0.5 tablets (10 mg) by mouth daily. 45 tablet 3     levalbuterol (XOPENEX) 1.25 MG/3ML neb solution USE 1 VIAL VIA NEBULIZER EVERY 6 HOURS AS NEEDED FOR WHEEZING 9 mL 0     levalbuterol (XOPENEX) 1.25 MG/3ML neb  solution Take 3 mLs (1.25 mg) by nebulization 2 times daily. 180 mL 0     metoprolol tartrate (LOPRESSOR) 50 MG tablet Take 1 tablet (50 mg) by mouth 2 times daily. 180 tablet 3     rivaroxaban ANTICOAGULANT (XARELTO) 20 MG TABS tablet Take 1 tablet (20 mg) by mouth daily (with breakfast). 90 tablet 3     sacubitril-valsartan (ENTRESTO) 24-26 MG per tablet Take 1 tablet by mouth 2 times daily. 60 tablet 11     simvastatin (ZOCOR) 10 MG tablet Take 1 tablet (10 mg) by mouth at bedtime. 90 tablet 1       Allergies Reviewed and Updated:  No Known Allergies    Review of Systems:  Skin:  not assessed     Eyes:  Positive for glasses  ENT:  not assessed    Respiratory:  Negative shortness of breath, dyspnea on exertion  Cardiovascular:  Negative, palpitations, chest pain, dizziness, lightheadedness Positive for, edema  Gastroenterology: not assessed    Genitourinary:  not assessed    Musculoskeletal:  not assessed    Neurologic:  not assessed    Psychiatric:  not assessed    Heme/Lymph/Imm:  Negative allergies  Endocrine:  Negative       Pertinent Past Medical, Surgical and Family History Reviewed and noted above.     CC  Melissa Alvarez PA-C  [address] [place] [address]      Thank you for allowing me to participate in the care of your patient.      Sincerely,     Cassie Sawant PA-C     [facility]  cc:   Melissa Alvarez PA-C  [address] [place] [address]

## 2025-04-10 NOTE — TELEPHONE ENCOUNTER
----- Message from Payton Brooks sent at 4/10/2025 12:02 PM CDT -----  Regarding: RE: entresto & slgt2  Good afternoon,     This [facility]   Entresto is $25 for 30 days supply    Jardiance and Farxiga both require a prior auth with the insurance.     Thank you for allowing me to help with your patient  Payton Brooks [facility]  Pharmacy Discharge Liaison   [place] locations  Available on teams and [employer]    Disclaimer: Pharmacy test claims are estimates and may not reflect final costs. Suggested alternatives aim to be cost-effective but may not be therapeutically equivalent as this consult is informational and does not constitute medical advice. Clinical decisions should be made by qualified healthcare providers.  ----- Message -----  From: Cassie Sawant PA-C  Sent: 4/10/2025  11:13 AM CDT  To: Rx Coverage Check For Heart Clinics  Subject: entresto & slgt2                                 Andrew,     I'd like Arline Saini to start a sglt2i and/or Entresto.  Could you please help us figure out pricing/ prior auth?    Thank you!    Cassie Sawant PA-C  11:13 AM 4/10/2025   [facility]

## 2025-04-24 DIAGNOSIS — I50.31 ACUTE DIASTOLIC CONGESTIVE HEART FAILURE (H): Primary | ICD-10-CM

## 2025-05-02 DIAGNOSIS — J45.40 MODERATE PERSISTENT ASTHMA WITHOUT COMPLICATION: ICD-10-CM

## 2025-05-02 NOTE — TELEPHONE ENCOUNTER
Medication Question or Refill        What medication are you calling about (include dose and sig)?: nebulizer refill - currently schedule 5/8 - but need extension till then.    Preferred Pharmacy:       [id number] - [place] - [address] [facility] [address]  [address] [place] [place] [address]  Phone: [phone] Fax: [phone]      Controlled Substance Agreement on file:   CSA -- Patient Level:    CSA: None found at the patient level.       Who prescribed the medication?: namballa    Do you need a refill? Yes    When did you use the medication last? ongoing    Patient offered an appointment? Yes: scheduled 5/8    Do you have any questions or concerns?  No      Could we send this information to you in [facility] or would you prefer to receive a phone call?:   Patient would prefer a phone call   Okay to leave a detailed message?: Yes at Home number on file [phone] (home)

## 2025-05-04 RX ORDER — LEVALBUTEROL INHALATION SOLUTION 1.25 MG/3ML
1.25 SOLUTION RESPIRATORY (INHALATION) 2 TIMES DAILY
Qty: 180 ML | Refills: 0 | Status: SHIPPED | OUTPATIENT
Start: 2025-05-04 | End: 2025-05-08

## 2025-05-08 ENCOUNTER — VIRTUAL VISIT (OUTPATIENT)
Dept: FAMILY MEDICINE | Facility: CLINIC | Age: 75
End: 2025-05-08
Payer: COMMERCIAL

## 2025-05-08 ENCOUNTER — PATIENT OUTREACH (OUTPATIENT)
Dept: CARE COORDINATION | Facility: CLINIC | Age: 75
End: 2025-05-08

## 2025-05-08 DIAGNOSIS — I10 ESSENTIAL HYPERTENSION: ICD-10-CM

## 2025-05-08 DIAGNOSIS — E78.5 DYSLIPIDEMIA: ICD-10-CM

## 2025-05-08 DIAGNOSIS — I50.22 CHRONIC SYSTOLIC CONGESTIVE HEART FAILURE (H): Primary | ICD-10-CM

## 2025-05-08 DIAGNOSIS — Z00.00 ROUTINE GENERAL MEDICAL EXAMINATION AT A HEALTH CARE FACILITY: ICD-10-CM

## 2025-05-08 DIAGNOSIS — J45.40 MODERATE PERSISTENT ASTHMA WITHOUT COMPLICATION: ICD-10-CM

## 2025-05-08 DIAGNOSIS — I48.91 ATRIAL FIBRILLATION WITH RVR (H): ICD-10-CM

## 2025-05-08 DIAGNOSIS — Z79.01 ON CONTINUOUS ORAL ANTICOAGULATION: ICD-10-CM

## 2025-05-08 DIAGNOSIS — N18.31 STAGE 3A CHRONIC KIDNEY DISEASE (H): ICD-10-CM

## 2025-05-08 DIAGNOSIS — Z02.89 ENCOUNTER FOR COMPLETION OF FORM WITH PATIENT: ICD-10-CM

## 2025-05-08 RX ORDER — FUROSEMIDE 20 MG/1
10 TABLET ORAL DAILY
Qty: 45 TABLET | Refills: 3 | Status: SHIPPED | OUTPATIENT
Start: 2025-05-08

## 2025-05-08 RX ORDER — LEVALBUTEROL INHALATION SOLUTION 1.25 MG/3ML
1.25 SOLUTION RESPIRATORY (INHALATION) 2 TIMES DAILY
Qty: 540 ML | Refills: 1 | Status: SHIPPED | OUTPATIENT
Start: 2025-05-08

## 2025-05-08 RX ORDER — SIMVASTATIN 10 MG
10 TABLET ORAL AT BEDTIME
Qty: 90 TABLET | Refills: 1 | OUTPATIENT
Start: 2025-05-08

## 2025-05-08 ASSESSMENT — ASTHMA QUESTIONNAIRES
ACT_TOTALSCORE: 20
QUESTION_3 LAST FOUR WEEKS HOW OFTEN DID YOUR ASTHMA SYMPTOMS (WHEEZING, COUGHING, SHORTNESS OF BREATH, CHEST TIGHTNESS OR PAIN) WAKE YOU UP AT NIGHT OR EARLIER THAN USUAL IN THE MORNING: NOT AT ALL
QUESTION_5 LAST FOUR WEEKS HOW WOULD YOU RATE YOUR ASTHMA CONTROL: SOMEWHAT CONTROLLED
ACT_TOTALSCORE: 16
QUESTION_2 LAST FOUR WEEKS HOW OFTEN HAVE YOU HAD SHORTNESS OF BREATH: THREE TO SIX TIMES A WEEK
QUESTION_1 LAST FOUR WEEKS HOW MUCH OF THE TIME DID YOUR ASTHMA KEEP YOU FROM GETTING AS MUCH DONE AT WORK, SCHOOL OR AT HOME: A LITTLE OF THE TIME
QUESTION_4 LAST FOUR WEEKS HOW OFTEN HAVE YOU USED YOUR RESCUE INHALER OR NEBULIZER MEDICATION (SUCH AS ALBUTEROL): THREE OR MORE TIMES PER DAY

## 2025-05-08 NOTE — PROGRESS NOTES
Arline is a 74 year old who is being evaluated via a billable video visit.    How would you like to obtain your AVS? MyChart  If the video visit is dropped, the invitation should be resent by: Text to cell phone: [phone]  Will anyone else be joining your video visit? No          Assessment and Plan  1. Chronic systolic congestive heart failure (H) (Primary)  Chronic stable, patient recent echocardiogram showing LVEF at 55 to 60%.  Continue current medications as managed by cardiology but patient requesting to take over all refills to PCP as she is unable to get the refill requests promptly from cardiology.  Will do as requested, and Entresto which should be only cardiology for coverage issues and remaining medications okay to be taken care of by PCP.  Patient understand with plan.  - furosemide (LASIX) 20 MG tablet; Take 0.5 tablets (10 mg) by mouth daily.  Dispense: 45 tablet; Refill: 3    2. Atrial fibrillation with RVR (H)  3. On continuous oral anticoagulation  Recent diagnosis of atrial fibrillation with RVR, started on anticoagulation.  Status post cardioversion on 3/17/2025, reviewed the recent cardiology note on 4/10/2025 with the current medications updated after discussing the patient.    4. Stage 3a chronic kidney disease (H)  Chronic problem, improving.  Last check in February 2025 showing worsening CKD at EGFR 36 but later has been improved to 54 currently.  Patient not seeing any nephrology yet.  Will discuss further in upcoming annual physical regarding possible need of nephrology on board given the comorbidities of CHF.  -Emphasized to avoid nephrotoxins and continue current Lasix which patient is requesting to take over from cardiology as she states she is having difficulty getting refills from cardiology.  - Basic metabolic panel  (Ca, Cl, CO2, Creat, Gluc, K, Na, BUN); Future    5. Essential hypertension  Well-controlled on current Entresto, Lasix and metoprolol.  Continue the same.    6.  Moderate persistent asthma without complication  Well-controlled with current levalbuterol nebulizations which patient prefers given the atrial fibrillation risk factor cannot give albuterol.  Patient is very concerned regarding the short supply of levalbuterol being given by previous providers after the ER and requesting to give 90-day supplies.  Will do as requested.  - levalbuterol (XOPENEX) 1.25 MG/3ML neb solution; Take 3 mLs (1.25 mg) by nebulization 2 times daily.  Dispense: 540 mL; Refill: 1    7. Encounter for completion of form with patient  Filled in the DMV disability placard as requested and placed at  for you to  as opted.  I have filled that daughter Leslie will be driving for the patient.       The longitudinal plan of care for the diagnosis(es)/condition(s) as documented were addressed during this visit. Due to the added complexity in care, I will continue to support Arline in the subsequent management and with ongoing continuity of care.      Please note that this note consists of symbols derived from keyboarding, dictation and/or voice recognition software. As a result, there may be errors in the script that have gone undetected. Please consider this when interpreting information found in this chart.    Patient Instructions   As discussed went ahead and did get of refills of your Lasix as well as levalbuterol nebulization liquids with 90-day supplies as requested.    Please check with your cardiology regarding Entresto refills as I will not be able to refill them but other medications will take care as requested.    Filled the DMV disability placard as requested, please come to the  and pick the completed form.      Return in about 29 days (around 6/6/2025), or if symptoms worsen or fail to improve, for Annual Wellness Exam.    Amber Raya MD  [facility] ANAHY Nunn is a 74 year old, presenting for the following [facility]  issues:  Recheck Medication        5/8/2025     3:38 PM   Additional Questions   Roomed by Beni MACIEL         5/8/2025   Forms   Any forms needing to be completed Yes       HPI      Concern - Complete Handicap Parking Cert     Medication Followup   Taking Medication as prescribed: yes - would like to have labs done   Side Effects:  None  Medication Helping Symptoms:  yes      Review of Systems  Constitutional, HEENT, cardiovascular, pulmonary, GI, , musculoskeletal, neuro, skin, endocrine and psych systems are negative, except as otherwise noted.      Objective           Vitals:  No vitals were obtained today due to virtual visit.    Physical Exam   GENERAL: alert and no distress  EYES: Eyes grossly normal to inspection.  No discharge or erythema, or obvious scleral/conjunctival abnormalities.  RESP: No audible wheeze, cough, or visible cyanosis.    SKIN: Visible skin clear. No significant rash, abnormal pigmentation or lesions.  NEURO: Cranial nerves grossly intact.  Mentation and speech appropriate for age.  PSYCH: Appropriate affect, tone, and pace of words      Video-Visit Details    Type of service:  Video Visit   Originating Location (pt. Location): Home    Distant Location (provider location):  On-site  Platform used for Video Visit: Kimber  Signed Electronically by: Amber Raya MD

## 2025-05-08 NOTE — PATIENT INSTRUCTIONS
As discussed went ahead and did get of refills of your Lasix as well as levalbuterol nebulization liquids with 90-day supplies as requested.    Please check with your cardiology regarding Entresto refills as I will not be able to refill them but other medications will take care as requested.    Filled the DMV disability placard as requested, please come to the  and pick the completed form.

## 2025-05-09 ENCOUNTER — RESULTS FOLLOW-UP (OUTPATIENT)
Dept: CARDIOLOGY | Facility: CLINIC | Age: 75
End: 2025-05-09

## 2025-05-09 ENCOUNTER — TELEPHONE (OUTPATIENT)
Dept: FAMILY MEDICINE | Facility: CLINIC | Age: 75
End: 2025-05-09
Payer: COMMERCIAL

## 2025-05-09 NOTE — TELEPHONE ENCOUNTER
Forms/Letter Request    Type of form/letter: DMV/Handicap Parking  - Disability Parking Certificate    Is Release of Information needed?: No     Do we have the form/letter: Yes, PCP portion of FORM COMPLETED.     Form at  3 TC desk.     Who is the form from? [place] Dept Public Safety, DVS form.    Where did/will the form come from? Clinic form    When is form/letter needed by: As able    How would you like the form/letter returned:   DETERMINE  OR MAIL.     Rachel Kenny on 5/9/2025 at 5:09 PM

## 2025-05-12 NOTE — TELEPHONE ENCOUNTER
Spoke with daughter (C2C on file) who will  form in-person.     Form placed at  for  5/12 or 5/13/25.     Form faxed to [employer] and filed Team 3.     Rachel Kenny on 5/12/2025 at 3:12 PM    Patient's daughter came in and picked up forms at the . On 5/12/25 at 4:22

## 2025-06-29 DIAGNOSIS — J45.40 MODERATE PERSISTENT ASTHMA WITHOUT COMPLICATION: ICD-10-CM

## 2025-06-29 DIAGNOSIS — Z00.00 ROUTINE GENERAL MEDICAL EXAMINATION AT A HEALTH CARE FACILITY: ICD-10-CM

## 2025-06-30 NOTE — TELEPHONE ENCOUNTER
Clinic RN: Please investigate patient's chart or contact patient if the information cannot be found because the medication is listed as historical or discontinued. Confirm patient is taking this medication. Document findings and route refill encounter to provider for approval or denial.    Eula CHAMPAGNE RN  [facility] Triage Team

## 2025-07-01 DIAGNOSIS — Z00.00 ROUTINE GENERAL MEDICAL EXAMINATION AT A HEALTH CARE FACILITY: ICD-10-CM

## 2025-07-01 DIAGNOSIS — J45.40 MODERATE PERSISTENT ASTHMA WITHOUT COMPLICATION: ICD-10-CM

## 2025-07-01 RX ORDER — FLUTICASONE PROPIONATE AND SALMETEROL 50; 500 UG/1; UG/1
1 POWDER RESPIRATORY (INHALATION) EVERY 12 HOURS
Qty: 180 EACH | Refills: 2 | Status: SHIPPED | OUTPATIENT
Start: 2025-07-01

## 2025-07-01 NOTE — TELEPHONE ENCOUNTER
e|tab message sent to the patient.     Eula CHAMPAGNE RN  [facility] Triage Team

## 2025-07-02 NOTE — TELEPHONE ENCOUNTER
MC message not read. Pt did not answer the call either.   Patient Contact    Attempt # 1    Was call answered?  No.  Left message on voicemail with information to call me back.

## 2025-07-02 NOTE — TELEPHONE ENCOUNTER
"Dr. Raya, daughter calling regarding the refill. The medication is no longer on her current medication list. It looks like the last refill would last until 2/2025.     Daughter explains she will use the inhaler as a rescue inhaler for SOB or wheezing.  She does not have the current symptoms.     Can this medication be filled?     She continues on the fluticasone and her nebulizer medication.     Daughter states her \" asthma is well maintained.\"       Hannah Hanks RN  -[facility]         "

## 2025-07-03 RX ORDER — ALBUTEROL SULFATE 90 UG/1
2 INHALANT RESPIRATORY (INHALATION) EVERY 4 HOURS PRN
Qty: 18 G | Refills: 0 | Status: SHIPPED | OUTPATIENT
Start: 2025-07-03

## 2025-07-03 NOTE — TELEPHONE ENCOUNTER
Please call the patient and schedule Annual physical with me, which patient is due at this time, to further take care of the medical conditions and medications. OK to use same day slot / 8 AM on Tuesdays/Fridays in 1-2 weeks.     ( FYI - Pt doesn't respond WatchGuardhart messages - please call instead of sending WatchGuard[employer]StarMobile message for scheduling )     Thank you,  Amber Raya MD on 7/3/2025

## 2025-07-10 ENCOUNTER — OFFICE VISIT (OUTPATIENT)
Dept: FAMILY MEDICINE | Facility: CLINIC | Age: 75
End: 2025-07-10
Payer: COMMERCIAL

## 2025-07-10 VITALS
WEIGHT: 236 LBS | BODY MASS INDEX: 39.32 KG/M2 | OXYGEN SATURATION: 97 % | DIASTOLIC BLOOD PRESSURE: 80 MMHG | HEART RATE: 67 BPM | SYSTOLIC BLOOD PRESSURE: 112 MMHG | TEMPERATURE: 97.5 F | HEIGHT: 65 IN

## 2025-07-10 DIAGNOSIS — N18.31 STAGE 3A CHRONIC KIDNEY DISEASE (H): ICD-10-CM

## 2025-07-10 DIAGNOSIS — Z12.11 SCREEN FOR COLON CANCER: ICD-10-CM

## 2025-07-10 DIAGNOSIS — Z00.00 ENCOUNTER FOR MEDICARE ANNUAL WELLNESS EXAM: ICD-10-CM

## 2025-07-10 DIAGNOSIS — Z00.00 ROUTINE GENERAL MEDICAL EXAMINATION AT A HEALTH CARE FACILITY: Primary | ICD-10-CM

## 2025-07-10 DIAGNOSIS — I48.91 ATRIAL FIBRILLATION WITH RAPID VENTRICULAR RESPONSE (H): ICD-10-CM

## 2025-07-10 DIAGNOSIS — R10.12 ABDOMINAL PAIN, LEFT UPPER QUADRANT: ICD-10-CM

## 2025-07-10 DIAGNOSIS — Z02.89 ENCOUNTER FOR COMPLETION OF FORM WITH PATIENT: ICD-10-CM

## 2025-07-10 DIAGNOSIS — E66.01 MORBID OBESITY (H): ICD-10-CM

## 2025-07-10 DIAGNOSIS — I10 ESSENTIAL HYPERTENSION: ICD-10-CM

## 2025-07-10 DIAGNOSIS — R73.03 PREDIABETES: ICD-10-CM

## 2025-07-10 DIAGNOSIS — K21.00 GASTROESOPHAGEAL REFLUX DISEASE WITH ESOPHAGITIS, UNSPECIFIED WHETHER HEMORRHAGE: ICD-10-CM

## 2025-07-10 DIAGNOSIS — Z79.01 ON CONTINUOUS ORAL ANTICOAGULATION: ICD-10-CM

## 2025-07-10 DIAGNOSIS — H66.3X3 CHRONIC SUPPURATIVE OTITIS MEDIA OF BOTH EARS, UNSPECIFIED OTITIS MEDIA LOCATION: ICD-10-CM

## 2025-07-10 DIAGNOSIS — I48.91 ATRIAL FIBRILLATION WITH RVR (H): ICD-10-CM

## 2025-07-10 DIAGNOSIS — I67.9 CEREBROVASCULAR DISEASE: ICD-10-CM

## 2025-07-10 DIAGNOSIS — Z12.31 VISIT FOR SCREENING MAMMOGRAM: ICD-10-CM

## 2025-07-10 DIAGNOSIS — J45.40 MODERATE PERSISTENT ASTHMA WITHOUT COMPLICATION: ICD-10-CM

## 2025-07-10 DIAGNOSIS — R10.2 SUPRAPUBIC PAIN: ICD-10-CM

## 2025-07-10 DIAGNOSIS — I50.22 CHRONIC SYSTOLIC CONGESTIVE HEART FAILURE (H): ICD-10-CM

## 2025-07-10 DIAGNOSIS — Z78.0 ASYMPTOMATIC POSTMENOPAUSAL STATUS: ICD-10-CM

## 2025-07-10 PROBLEM — H66.3X9 CHRONIC PURULENT OTITIS MEDIA: Status: ACTIVE | Noted: 2025-07-10

## 2025-07-10 PROBLEM — H90.6 MIXED CONDUCTIVE AND SENSORINEURAL HEARING LOSS OF BOTH EARS: Status: ACTIVE | Noted: 2025-07-10

## 2025-07-10 LAB
ERYTHROCYTE [DISTWIDTH] IN BLOOD BY AUTOMATED COUNT: 14.4 % (ref 10–15)
EST. AVERAGE GLUCOSE BLD GHB EST-MCNC: 117 MG/DL
HBA1C MFR BLD: 5.7 % (ref 0–5.6)
HCT VFR BLD AUTO: 42.4 % (ref 35–47)
HGB BLD-MCNC: 14 G/DL (ref 11.7–15.7)
MCH RBC QN AUTO: 29.7 PG (ref 26.5–33)
MCHC RBC AUTO-ENTMCNC: 33 G/DL (ref 31.5–36.5)
MCV RBC AUTO: 90 FL (ref 78–100)
PLATELET # BLD AUTO: 179 10E3/UL (ref 150–450)
RBC # BLD AUTO: 4.72 10E6/UL (ref 3.8–5.2)
WBC # BLD AUTO: 5.7 10E3/UL (ref 4–11)

## 2025-07-10 RX ORDER — OMEPRAZOLE 40 MG/1
40 CAPSULE, DELAYED RELEASE ORAL DAILY
Qty: 90 CAPSULE | Refills: 1 | Status: SHIPPED | OUTPATIENT
Start: 2025-07-10

## 2025-07-10 RX ORDER — OFLOXACIN 3 MG/ML
SOLUTION AURICULAR (OTIC)
COMMUNITY
Start: 2025-06-08

## 2025-07-10 SDOH — HEALTH STABILITY: PHYSICAL HEALTH: ON AVERAGE, HOW MANY DAYS PER WEEK DO YOU ENGAGE IN MODERATE TO STRENUOUS EXERCISE (LIKE A BRISK WALK)?: 1 DAY

## 2025-07-10 ASSESSMENT — PAIN SCALES - GENERAL: PAINLEVEL_OUTOF10: NO PAIN (0)

## 2025-07-10 ASSESSMENT — ASTHMA QUESTIONNAIRES: ACT_TOTALSCORE: 20

## 2025-07-10 ASSESSMENT — SOCIAL DETERMINANTS OF HEALTH (SDOH): HOW OFTEN DO YOU GET TOGETHER WITH FRIENDS OR RELATIVES?: MORE THAN THREE TIMES A WEEK

## 2025-07-10 NOTE — PATIENT INSTRUCTIONS
As discussed, please do fasting labs placed     Refills will be taken care     ======================        Patient Education   Preventive Care Advice   This is general advice given by our system to help you stay healthy. However, your care team may have specific advice just for you. Please talk to your care team about your preventive care needs.  Nutrition  Eat 5 or more servings of fruits and vegetables each day.  Try wheat bread, brown rice and whole grain pasta (instead of white bread, rice, and pasta).  Get enough calcium and vitamin D. Check the label on foods and aim for 100% of the RDA (recommended daily allowance).  Lifestyle  Exercise at least 150 minutes each week  (30 minutes a day, 5 days a week).  Do muscle strengthening activities 2 days a week. These help control your weight and prevent disease.  No smoking.  Wear sunscreen to prevent skin cancer.  Have a dental exam and cleaning every 6 months.  Yearly exams  See your health care team every year to talk about:  Any changes in your health.  Any medicines your care team has prescribed.  Preventive care, family planning, and ways to prevent chronic diseases.  Shots (vaccines)   HPV shots (up to age 26), if you've never had them before.  Hepatitis B shots (up to age 59), if you've never had them before.  COVID-19 shot: Get this shot when it's due.  Flu shot: Get a flu shot every year.  Tetanus shot: Get a tetanus shot every 10 years.  Pneumococcal, hepatitis A, and RSV shots: Ask your care team if you need these based on your risk.  Shingles shot (for age 50 and up)  General health tests  Diabetes screening:  Starting at age 35, Get screened for diabetes at least every 3 years.  If you are younger than age 35, ask your care team if you should be screened for diabetes.  Cholesterol test: At age 39, start having a cholesterol test every 5 years, or more often if advised.  Bone density scan (DEXA): At age 50, ask your care team if you should have this  scan for osteoporosis (brittle bones).  Hepatitis C: Get tested at least once in your life.  STIs (sexually transmitted infections)  Before age 24: Ask your care team if you should be screened for STIs.  After age 24: Get screened for STIs if you're at risk. You are at risk for STIs (including HIV) if:  You are sexually active with more than one person.  You don't use condoms every time.  You or a partner was diagnosed with a sexually transmitted infection.  If you are at risk for HIV, ask about PrEP medicine to prevent HIV.  Get tested for HIV at least once in your life, whether you are at risk for HIV or not.  Cancer screening tests  Cervical cancer screening: If you have a cervix, begin getting regular cervical cancer screening tests starting at age 21.  Breast cancer scan (mammogram): If you've ever had breasts, begin having regular mammograms starting at age 40. This is a scan to check for breast cancer.  Colon cancer screening: It is important to start screening for colon cancer at age 45.  Have a colonoscopy test every 10 years (or more often if you're at risk) Or, ask your provider about stool tests like a FIT test every year or Cologuard test every 3 years.  To learn more about your testing options, visit:   .  For help making a decision, visit:   https://bit.ly/pk18435.  Prostate cancer screening test: If you have a prostate, ask your care team if a prostate cancer screening test (PSA) at age 55 is right for you.  Lung cancer screening: If you are a current or former smoker ages 50 to 80, ask your care team if ongoing lung cancer screenings are right for you.  For informational purposes only. Not to replace the advice of your health care provider. Copyright   2023 [employer]. All rights reserved. Clinically reviewed by the [facility]. thePlatform 457390 - REV 01/24.  Hearing Loss: Care Instructions  Overview     Hearing loss is a sudden or slow decrease in how well  you hear. It can range from slight to profound. Permanent hearing loss can occur with aging. It also can happen when you are exposed long-term to loud noise. Examples include listening to loud music, riding motorcycles, or being around other loud machines.  Hearing loss can affect your work and home life. It can make you feel lonely or depressed. You may feel that you have lost your independence. But hearing aids and other devices can help you hear better and feel connected to others.  Follow-up care is a key part of your treatment and safety. Be sure to make and go to all appointments, and call your doctor if you are having problems. It's also a good idea to know your test results and keep a list of the medicines you take.  How can you care for yourself at home?  Avoid loud noises whenever possible. This helps keep your hearing from getting worse.  Always wear hearing protection around loud noises.  Wear a hearing aid as directed.  A professional can help you pick a hearing aid that will work best for you.  You can also get hearing aids over the counter for mild to moderate hearing loss.  Have hearing tests as your doctor suggests. They can show whether your hearing has changed. Your hearing aid may need to be adjusted.  Use other devices as needed. These may include:  Telephone amplifiers and hearing aids that can connect to a television, stereo, radio, or microphone.  Devices that use lights or vibrations. These alert you to the doorbell, a ringing telephone, or a baby monitor.  Television closed-captioning. This shows the words at the bottom of the screen. Most new TVs can do this.  TTY (text telephone). This lets you type messages back and forth on the telephone instead of talking or listening. These devices are also called TDD. When messages are typed on the keyboard, they are sent over the phone line to a receiving TTY. The message is shown on a monitor.  Use text messaging, social media, and email if it is  "hard for you to communicate by telephone.  Try to learn a listening technique called speechreading. It is not lipreading. You pay attention to people's gestures, expressions, posture, and tone of voice. These clues can help you understand what a person is saying. Face the person you are talking to, and have them face you. Make sure the lighting is good. You need to see the other person's face clearly.  Think about counseling if you need help to adjust to your hearing loss.  When should you call for help?  Watch closely for changes in your health, and be sure to contact your doctor if:    You think your hearing is getting worse.     You have new symptoms, such as dizziness or nausea.   Where can you learn more?  Go to https://www.[employer]/patiented  Enter R798 in the search box to learn more about \"Hearing Loss: Care Instructions.\"  Current as of: October 27, 2024  Content Version: 14.5 2024-2025 [employer].   Care instructions adapted under license by your healthcare professional. If you have questions about a medical condition or this instruction, always ask your healthcare professional. [employer] disclaims any warranty or liability for your use of this information.    Learning About Sleeping Well  What does sleeping well mean?     Sleeping well means getting enough sleep to feel good and stay healthy. How much sleep is enough varies among people.  The number of hours you sleep and how you feel when you wake up are both important. If you do not feel refreshed, you probably need more sleep. Another sign of not getting enough sleep is feeling tired during the day.  Experts recommend that adults get at least 7 or more hours of sleep per day. Children and older adults need more sleep.  Why is getting enough sleep important?  Getting enough quality sleep is a basic part of good health. When your sleep suffers, your physical health, mood, and your thoughts can suffer too. You may find " "yourself feeling more grumpy or stressed. Not getting enough sleep also can lead to serious problems, including injury, accidents, anxiety, and depression.  What might cause poor sleeping?  Many things can cause sleep problems, including:  Changes to your sleep schedule.  Stress. Stress can be caused by fear about a single event, such as giving a speech. Or you may have ongoing stress, such as worry about work or school.  Depression, anxiety, and other mental or emotional conditions.  Changes in your sleep habits or surroundings. This includes changes that happen where you sleep, such as noise, light, or sleeping in a different bed. It also includes changes in your sleep pattern, such as having jet lag or working a late shift.  Health problems, such as pain, breathing problems, and restless legs syndrome.  Lack of regular exercise.  Using alcohol, nicotine, or caffeine before bed.  How can you help yourself?  Here are some tips that may help you sleep more soundly and wake up feeling more refreshed.  Your sleeping area   Use your bedroom only for sleeping and sex. A bit of light reading may help you fall asleep. But if it doesn't, do your reading elsewhere in the house. Try not to use your TV, computer, smartphone, or tablet while you are in bed.  Be sure your bed is big enough to stretch out comfortably, especially if you have a sleep partner.  Keep your bedroom quiet, dark, and cool. Use curtains, blinds, or a sleep mask to block out light. To block out noise, use earplugs, soothing music, or a \"white noise\" machine.  Your evening and bedtime routine   Create a relaxing bedtime routine. You might want to take a warm shower or bath, or listen to soothing music.  Go to bed at the same time every night. And get up at the same time every morning, even if you feel tired.  What to avoid   Limit caffeine (coffee, tea, caffeinated sodas) during the day, and don't have any for at least 6 hours before bedtime.  Avoid " "drinking alcohol before bedtime. Alcohol can cause you to wake up more often during the night.  Try not to smoke or use tobacco, especially in the evening. Nicotine can keep you awake.  Limit naps during the day, especially close to bedtime.  Avoid lying in bed awake for too long. If you can't fall asleep or if you wake up in the middle of the night and can't get back to sleep within about 20 minutes, get out of bed and go to another room until you feel sleepy.  Avoid taking medicine right before bed that may keep you awake or make you feel hyper or energized. Your doctor can tell you if your medicine may do this and if you can take it earlier in the day.  If you can't sleep   Imagine yourself in a peaceful, pleasant scene. Focus on the details and feelings of being in a place that is relaxing.  Get up and do a quiet or boring activity until you feel sleepy.  Avoid drinking any liquids before going to bed to help prevent waking up often to use the bathroom.  Where can you learn more?  Go to https://www.[employer].net/patiented  Enter J942 in the search box to learn more about \"Learning About Sleeping Well.\"  Current as of: July 31, 2024  Content Version: 14.5 2024-2025 [employer].   Care instructions adapted under license by your healthcare professional. If you have questions about a medical condition or this instruction, always ask your healthcare professional. [employer] disclaims any warranty or liability for your use of this information.    Bladder Training: Care Instructions  Your Care Instructions     Bladder training is used to treat urge incontinence and stress incontinence. Urge incontinence means that the need to urinate comes on so fast that you can't get to a toilet in time. Stress incontinence means that you leak urine because of pressure on your bladder. For example, it may happen when you laugh, cough, or lift something heavy.  Bladder training can increase how long you can " wait before you have to urinate. It can also help your bladder hold more urine. And it can give you better control over the urge to urinate.  It is important to remember that bladder training takes a few weeks to a few months to make a difference. You may not see results right away, but don't give up.  Follow-up care is a key part of your treatment and safety. Be sure to make and go to all appointments, and call your doctor if you are having problems. It's also a good idea to know your test results and keep a list of the medicines you take.  How can you care for yourself at home?  Work with your doctor to come up with a bladder training program that is right for you. You may use one or more of the following methods.  Delayed urination  In the beginning, try to keep from urinating for 5 minutes after you first feel the need to go.  While you wait, take deep, slow breaths to relax. Kegel exercises can also help you delay the need to go to the bathroom.  After some practice, when you can easily wait 5 minutes to urinate, try to wait 10 minutes before you urinate.  Slowly increase the waiting period until you are able to control when you have to urinate.  Scheduled urination  Empty your bladder when you first wake up in the morning.  Schedule times throughout the day when you will urinate.  Start by going to the bathroom every hour, even if you don't need to go.  Slowly increase the time between trips to the bathroom.  When you have found a schedule that works well for you, keep doing it.  If you wake up during the night and have to urinate, do it. Apply your schedule to waking hours only.  Kegel exercises  These tighten and strengthen pelvic muscles, which can help you control the flow of urine. (If doing these exercises causes pain, stop doing them and talk with your doctor.) To do Kegel exercises:  Squeeze your muscles as if you were trying not to pass gas. Or squeeze your muscles as if you were stopping the flow of  "urine. Your belly, legs, and buttocks shouldn't move.  Hold the squeeze for 3 seconds, then relax for 5 to 10 seconds.  Start with 3 seconds, then add 1 second each week until you are able to squeeze for 10 seconds.  Repeat the exercise 10 times a session. Do 3 to 8 sessions a day.  When should you call for help?  Watch closely for changes in your health, and be sure to contact your doctor if:    Your incontinence is getting worse.     You do not get better as expected.   Where can you learn more?  Go to https://www.[employer].net/patiented  Enter V684 in the search box to learn more about \"Bladder Training: Care Instructions.\"  Current as of: April 30, 2024  Content Version: 14.5 2024-2025 [employer].   Care instructions adapted under license by your healthcare professional. If you have questions about a medical condition or this instruction, always ask your healthcare professional. [employer] disclaims any warranty or liability for your use of this information.       "

## 2025-07-10 NOTE — PROGRESS NOTES
Assessment and Plan  1. Routine general medical examination at a health care facility (Primary)  2. Encounter for Medicare annual wellness exam    Last seen pt in 2/2025 for hospital follow up visit. She did have Acute CHF , worsening CKD at that time. She is here for annual physical.     Reviewed recent cardiology visit , CM on Entresto , Persistent A.FIB on Xarelto and metoprolol.     Last labs in 4/2025 with improving CKD with elevated HBG which we will repeat only the pertinent workup at this time.    Does have acute concerns of uncontrolled GERD, upper and lower abdominal pain as she endorses which is mild discomfort as she states, will do pertinent workup but there is no CT abdomen done anytime as per chart review.  Can consider if nothing positive on the workup done below.    Also endorses ongoing bilateral ear CSOM  which she is supposed to get ear surgeries as per the ENT recommendations which she declines to get any surgeries at this time.  All the risks and complications understood.    - MA Screening Bilateral w/ Darek; Future  - Fecal colorectal cancer screen FIT - Future (S+30); Future  - Albumin Random Urine Quantitative with Creat Ratio; Future  - DEXA HIP/PELVIS/SPINE - Future; Future  - PRIMARY CARE FOLLOW-UP SCHEDULING; Future  - Comprehensive metabolic panel (BMP + Alb, Alk Phos, ALT, AST, Total. Bili, TP); Future  - CBC with platelets; Future  - Fecal colorectal cancer screen FIT - Future (S+30)  - Albumin Random Urine Quantitative with Creat Ratio  - Comprehensive metabolic panel (BMP + Alb, Alk Phos, ALT, AST, Total. Bili, TP)  - CBC with platelets    3. Chronic systolic congestive heart failure (H)  Chronic problem, continues to have trace edema bilaterally with recent echocardiogram showing 65% LVEF.  Continue current Lasix, Entresto as managed by cardiology.  - Comprehensive metabolic panel (BMP + Alb, Alk Phos, ALT, AST, Total. Bili, TP); Future  - Comprehensive metabolic panel (BMP + Alb,  Alk Phos, ALT, AST, Total. Bili, TP)    4. Atrial fibrillation with rapid ventricular response (H)  5. Atrial fibrillation with RVR (H)  6. On continuous oral anticoagulation  Chronic problem, rate controlled with metoprolol and on oral anticoagulation with Xarelto as managed by cardiology.    7. Morbid obesity (H)  Chronic problem, improving.  Patient understands the benefits of weight reduction on a forementioned comorbidities.    8. Stage 3a chronic kidney disease (H)  - Albumin Random Urine Quantitative with Creat Ratio; Future  - Comprehensive metabolic panel (BMP + Alb, Alk Phos, ALT, AST, Total. Bili, TP); Future  - CBC with platelets; Future  - Albumin Random Urine Quantitative with Creat Ratio  - Comprehensive metabolic panel (BMP + Alb, Alk Phos, ALT, AST, Total. Bili, TP)  - CBC with platelets    9. Cerebrovascular disease  Chronic problem, no acute concerns at this time.  Continue current oral anticoagulation, simvastatin    10. Moderate persistent asthma without complication  Chronic problem, intermittent flareups.  Current symptoms appear well-controlled which patient endorses the breathing trouble could be due to her CHF but the inhalers and nebulizers she is currently on is helping and well-controlled of symptoms.    11. Gastroesophageal reflux disease with esophagitis, unspecified whether hemorrhage  Chronic problem, uncontrolled with upper abdominal discomfort on palpation.  Will consider gastroenterology for possible need of endoscopy which patient would like to discuss with the gastroenterologist before placing the endoscopy orders.  Will start off on omeprazole 40 mg daily for improvement.  - Adult GI  Referral - Consult Only; Future  - omeprazole (PRILOSEC) 40 MG DR capsule; Take 1 capsule (40 mg) by mouth daily.  Dispense: 90 capsule; Refill: 1    12. Essential hypertension  Chronic problem, well-controlled on current Entresto, metoprolol and Lasix.    13. Chronic suppurative otitis  media of both ears, unspecified otitis media location  Ongoing problem, uncontrolled.  Patient supposed to get bilateral ear surgeries as recommended by ENT which she declines.  All the risks and complications understood.  Currently denies any flareups of her CS OM, physical exam ENT exam showing stable.  - ofloxacin (FLOXIN) 0.3 % otic solution; INSTILL 5 DROPS TO AFFECTED EAR TWICE DAILY FOR 10 DAYS AS NEEDED FOR EAR INFECTION    14. Prediabetes  - Hemoglobin A1c; Future    15. Suprapubic pain  New problem, will consider checking for urinalysis and treat if positive for UTI.  - UA Macroscopic with reflex to Microscopic and Culture - Lab Collect; Future  - UA Macroscopic with reflex to Microscopic and Culture - Lab Collect    16. Abdominal pain, left upper quadrant  Ongoing problem, patient is worried about the pancreas.  Though it is most likely GERD will consider checking lipase as requested  - Lipase; Future  - Lipase    17. Asymptomatic postmenopausal status  - DEXA HIP/PELVIS/SPINE - Future; Future    18. Screen for colon cancer  - Fecal colorectal cancer screen FIT - Future (S+30); Future  - Fecal colorectal cancer screen FIT - Future (S+30)    19. Visit for screening mammogram  - MA Screening Bilateral w/ Darek; Future    20. Encounter for completion of form with patient  Patient has brought in the [facility] forms of her diet and requesting for getting them taken care of this visit.  Will do as requested.  Will be scanned in patient's chart       Daughter Jessica in room helping impression interpretation.  Multiple questions, multiple concerns as mentioned above which are all taken care in this visit and explained the plan with the help of interpretation      The longitudinal plan of care for the diagnosis(es)/condition(s) as documented were addressed during this visit. Due to the added complexity in care, I will continue to support Arline in the subsequent management and with ongoing continuity of  care.        Please note that this note consists of symbols derived from keyboarding, dictation and/or voice recognition software. As a result, there may be errors in the script that have gone undetected. Please consider this when interpreting information found in this chart.    Patient Instructions   As discussed, please do fasting labs placed     Refills will be taken care     ======================        Patient Education  Preventive Care Advice   This is general advice given by our system to help you stay healthy. However, your care team may have specific advice just for you. Please talk to your care team about your preventive care needs.  Nutrition  Eat 5 or more servings of fruits and vegetables each day.  Try wheat bread, brown rice and whole grain pasta (instead of white bread, rice, and pasta).  Get enough calcium and vitamin D. Check the label on foods and aim for 100% of the RDA (recommended daily allowance).  Lifestyle  Exercise at least 150 minutes each week  (30 minutes a day, 5 days a week).  Do muscle strengthening activities 2 days a week. These help control your weight and prevent disease.  No smoking.  Wear sunscreen to prevent skin cancer.  Have a dental exam and cleaning every 6 months.  Yearly exams  See your health care team every year to talk about:  Any changes in your health.  Any medicines your care team has prescribed.  Preventive care, family planning, and ways to prevent chronic diseases.  Shots (vaccines)   HPV shots (up to age 26), if you've never had them before.  Hepatitis B shots (up to age 59), if you've never had them before.  COVID-19 shot: Get this shot when it's due.  Flu shot: Get a flu shot every year.  Tetanus shot: Get a tetanus shot every 10 years.  Pneumococcal, hepatitis A, and RSV shots: Ask your care team if you need these based on your risk.  Shingles shot (for age 50 and up)  General health tests  Diabetes screening:  Starting at age 35, Get screened for diabetes  at least every 3 years.  If you are younger than age 35, ask your care team if you should be screened for diabetes.  Cholesterol test: At age 39, start having a cholesterol test every 5 years, or more often if advised.  Bone density scan (DEXA): At age 50, ask your care team if you should have this scan for osteoporosis (brittle bones).  Hepatitis C: Get tested at least once in your life.  STIs (sexually transmitted infections)  Before age 24: Ask your care team if you should be screened for STIs.  After age 24: Get screened for STIs if you're at risk. You are at risk for STIs (including HIV) if:  You are sexually active with more than one person.  You don't use condoms every time.  You or a partner was diagnosed with a sexually transmitted infection.  If you are at risk for HIV, ask about PrEP medicine to prevent HIV.  Get tested for HIV at least once in your life, whether you are at risk for HIV or not.  Cancer screening tests  Cervical cancer screening: If you have a cervix, begin getting regular cervical cancer screening tests starting at age 21.  Breast cancer scan (mammogram): If you've ever had breasts, begin having regular mammograms starting at age 40. This is a scan to check for breast cancer.  Colon cancer screening: It is important to start screening for colon cancer at age 45.  Have a colonoscopy test every 10 years (or more often if you're at risk) Or, ask your provider about stool tests like a FIT test every year or Cologuard test every 3 years.  To learn more about your testing options, visit:   .  For help making a decision, visit:   https://bit.ly/sq54730.  Prostate cancer screening test: If you have a prostate, ask your care team if a prostate cancer screening test (PSA) at age 55 is right for you.  Lung cancer screening: If you are a current or former smoker ages 50 to 80, ask your care team if ongoing lung cancer screenings are right for you.  For informational purposes only. Not to replace the  advice of your health care provider. Copyright   2023 [employer]. All rights reserved. Clinically reviewed by the [facility]. [employer] 372082 - REV 01/24.  Hearing Loss: Care Instructions  Overview     Hearing loss is a sudden or slow decrease in how well you hear. It can range from slight to profound. Permanent hearing loss can occur with aging. It also can happen when you are exposed long-term to loud noise. Examples include listening to loud music, riding motorcycles, or being around other loud machines.  Hearing loss can affect your work and home life. It can make you feel lonely or depressed. You may feel that you have lost your independence. But hearing aids and other devices can help you hear better and feel connected to others.  Follow-up care is a key part of your treatment and safety. Be sure to make and go to all appointments, and call your doctor if you are having problems. It's also a good idea to know your test results and keep a list of the medicines you take.  How can you care for yourself at home?  Avoid loud noises whenever possible. This helps keep your hearing from getting worse.  Always wear hearing protection around loud noises.  Wear a hearing aid as directed.  A professional can help you pick a hearing aid that will work best for you.  You can also get hearing aids over the counter for mild to moderate hearing loss.  Have hearing tests as your doctor suggests. They can show whether your hearing has changed. Your hearing aid may need to be adjusted.  Use other devices as needed. These may include:  Telephone amplifiers and hearing aids that can connect to a television, stereo, radio, or microphone.  Devices that use lights or vibrations. These alert you to the doorbell, a ringing telephone, or a baby monitor.  Television closed-captioning. This shows the words at the bottom of the screen. Most new TVs can do this.  TTY (text telephone). This lets you  "type messages back and forth on the telephone instead of talking or listening. These devices are also called TDD. When messages are typed on the keyboard, they are sent over the phone line to a receiving TTY. The message is shown on a monitor.  Use text messaging, social media, and email if it is hard for you to communicate by telephone.  Try to learn a listening technique called speechreading. It is not lipreading. You pay attention to people's gestures, expressions, posture, and tone of voice. These clues can help you understand what a person is saying. Face the person you are talking to, and have them face you. Make sure the lighting is good. You need to see the other person's face clearly.  Think about counseling if you need help to adjust to your hearing loss.  When should you call for help?  Watch closely for changes in your health, and be sure to contact your doctor if:    You think your hearing is getting worse.     You have new symptoms, such as dizziness or nausea.   Where can you learn more?  Go to https://www.[employer]/patiented  Enter R798 in the search box to learn more about \"Hearing Loss: Care Instructions.\"  Current as of: October 27, 2024  Content Version: 14.5 2024-2025 [employer].   Care instructions adapted under license by your healthcare professional. If you have questions about a medical condition or this instruction, always ask your healthcare professional. [employer] disclaims any warranty or liability for your use of this information.    Learning About Sleeping Well  What does sleeping well mean?     Sleeping well means getting enough sleep to feel good and stay healthy. How much sleep is enough varies among people.  The number of hours you sleep and how you feel when you wake up are both important. If you do not feel refreshed, you probably need more sleep. Another sign of not getting enough sleep is feeling tired during the day.  Experts recommend that " "adults get at least 7 or more hours of sleep per day. Children and older adults need more sleep.  Why is getting enough sleep important?  Getting enough quality sleep is a basic part of good health. When your sleep suffers, your physical health, mood, and your thoughts can suffer too. You may find yourself feeling more grumpy or stressed. Not getting enough sleep also can lead to serious problems, including injury, accidents, anxiety, and depression.  What might cause poor sleeping?  Many things can cause sleep problems, including:  Changes to your sleep schedule.  Stress. Stress can be caused by fear about a single event, such as giving a speech. Or you may have ongoing stress, such as worry about work or school.  Depression, anxiety, and other mental or emotional conditions.  Changes in your sleep habits or surroundings. This includes changes that happen where you sleep, such as noise, light, or sleeping in a different bed. It also includes changes in your sleep pattern, such as having jet lag or working a late shift.  Health problems, such as pain, breathing problems, and restless legs syndrome.  Lack of regular exercise.  Using alcohol, nicotine, or caffeine before bed.  How can you help yourself?  Here are some tips that may help you sleep more soundly and wake up feeling more refreshed.  Your sleeping area   Use your bedroom only for sleeping and sex. A bit of light reading may help you fall asleep. But if it doesn't, do your reading elsewhere in the house. Try not to use your TV, computer, smartphone, or tablet while you are in bed.  Be sure your bed is big enough to stretch out comfortably, especially if you have a sleep partner.  Keep your bedroom quiet, dark, and cool. Use curtains, blinds, or a sleep mask to block out light. To block out noise, use earplugs, soothing music, or a \"white noise\" machine.  Your evening and bedtime routine   Create a relaxing bedtime routine. You might want to take a warm " "shower or bath, or listen to soothing music.  Go to bed at the same time every night. And get up at the same time every morning, even if you feel tired.  What to avoid   Limit caffeine (coffee, tea, caffeinated sodas) during the day, and don't have any for at least 6 hours before bedtime.  Avoid drinking alcohol before bedtime. Alcohol can cause you to wake up more often during the night.  Try not to smoke or use tobacco, especially in the evening. Nicotine can keep you awake.  Limit naps during the day, especially close to bedtime.  Avoid lying in bed awake for too long. If you can't fall asleep or if you wake up in the middle of the night and can't get back to sleep within about 20 minutes, get out of bed and go to another room until you feel sleepy.  Avoid taking medicine right before bed that may keep you awake or make you feel hyper or energized. Your doctor can tell you if your medicine may do this and if you can take it earlier in the day.  If you can't sleep   Imagine yourself in a peaceful, pleasant scene. Focus on the details and feelings of being in a place that is relaxing.  Get up and do a quiet or boring activity until you feel sleepy.  Avoid drinking any liquids before going to bed to help prevent waking up often to use the bathroom.  Where can you learn more?  Go to https://www.[employer].net/patiented  Enter J942 in the search box to learn more about \"Learning About Sleeping Well.\"  Current as of: July 31, 2024  Content Version: 14.5    6902-2754 [employer].   Care instructions adapted under license by your healthcare professional. If you have questions about a medical condition or this instruction, always ask your healthcare professional. [employer] disclaims any warranty or liability for your use of this information.    Bladder Training: Care Instructions  Your Care Instructions     Bladder training is used to treat urge incontinence and stress incontinence. Urge " incontinence means that the need to urinate comes on so fast that you can't get to a toilet in time. Stress incontinence means that you leak urine because of pressure on your bladder. For example, it may happen when you laugh, cough, or lift something heavy.  Bladder training can increase how long you can wait before you have to urinate. It can also help your bladder hold more urine. And it can give you better control over the urge to urinate.  It is important to remember that bladder training takes a few weeks to a few months to make a difference. You may not see results right away, but don't give up.  Follow-up care is a key part of your treatment and safety. Be sure to make and go to all appointments, and call your doctor if you are having problems. It's also a good idea to know your test results and keep a list of the medicines you take.  How can you care for yourself at home?  Work with your doctor to come up with a bladder training program that is right for you. You may use one or more of the following methods.  Delayed urination  In the beginning, try to keep from urinating for 5 minutes after you first feel the need to go.  While you wait, take deep, slow breaths to relax. Kegel exercises can also help you delay the need to go to the bathroom.  After some practice, when you can easily wait 5 minutes to urinate, try to wait 10 minutes before you urinate.  Slowly increase the waiting period until you are able to control when you have to urinate.  Scheduled urination  Empty your bladder when you first wake up in the morning.  Schedule times throughout the day when you will urinate.  Start by going to the bathroom every hour, even if you don't need to go.  Slowly increase the time between trips to the bathroom.  When you have found a schedule that works well for you, keep doing it.  If you wake up during the night and have to urinate, do it. Apply your schedule to waking hours only.  Kegel exercises  These  "tighten and strengthen pelvic muscles, which can help you control the flow of urine. (If doing these exercises causes pain, stop doing them and talk with your doctor.) To do Kegel exercises:  Squeeze your muscles as if you were trying not to pass gas. Or squeeze your muscles as if you were stopping the flow of urine. Your belly, legs, and buttocks shouldn't move.  Hold the squeeze for 3 seconds, then relax for 5 to 10 seconds.  Start with 3 seconds, then add 1 second each week until you are able to squeeze for 10 seconds.  Repeat the exercise 10 times a session. Do 3 to 8 sessions a day.  When should you call for help?  Watch closely for changes in your health, and be sure to contact your doctor if:    Your incontinence is getting worse.     You do not get better as expected.   Where can you learn more?  Go to https://www.[employer]/patiented  Enter V684 in the search box to learn more about \"Bladder Training: Care Instructions.\"  Current as of: April 30, 2024  Content Version: 14.5    3986-2243 [employer].   Care instructions adapted under license by your healthcare professional. If you have questions about a medical condition or this instruction, always ask your healthcare professional. [employer] disclaims any warranty or liability for your use of this information.      Return in about 6 months (around 1/10/2026), or if symptoms worsen or fail to improve, for Follow up of last visit, video visit.    Amber Raya MD  [facility] ANAHY Nunn is a 75 year old, presenting for the following health issues:  Wellness Visit, Asthma, and Forms        7/10/2025    10:27 AM   Additional Questions   Roomed by Susan FREEDMAN   Accompanied by Daughter- Jessica         7/10/2025   Forms   Any forms needing to be completed Yes     History of Present Illness       Back Pain:  She presents for follow up of back pain. Patient's back pain is a recurring " problem.  Location of back pain:  Left lower back  Description of back pain: cramping  Back pain spreads: nowhere    Since patient first noticed back pain, pain is: gradually worsening  Does back pain interfere with her job:  Yes       Heart Failure:  She presents for follow up of heart failure. She is experiencing shortness of breath at night or when lying flat, which is improved. She is experiencing lower extremity edema which is same as usual.   She denies orthopenea and is not coughing at night. Patient is not checking weight daily. She has recently had a weight increase.  She has side effects from medications including other.  She has has a medical visit for heart failure 3 or more times since the last visit.    Hyperlipidemia:  She presents for follow up of hyperlipidemia.   She is taking medication to lower cholesterol. She is not having myalgia or other side effects to statin medications.    Hypertension: She presents for follow up of hypertension.  She does check blood pressure  regularly outside of the clinic. Outpatient blood pressures have not been over 140/90. She follows a low salt diet.     Vascular Disease:  She presents for follow up of vascular disease.     She never takes nitroglycerin. She is not taking daily aspirin.She exercises with enough effort to increase her heart rate 9 or less minutes per day.  She exercises with enough effort to increase her heart rate 3 or less days per week.   She is taking medications regularly.      Last Echo:   Echo result w/o MOPS: Interpretation Summary Left ventricular systolic function is normal.The visual ejection fraction is 50-55%.Normal right ventricular size and systolic function.Moderate biatrial enlargement.Mild mitral valve regurgitation.Dilated inferior vena cava.Stable ascending aorta dilatation of 4.0 cm.Rhythm is atrial fibrillation.           7/10/2025   Asthma   1.  In the past 4 weeks, how much of the time did your asthma keep you from getting as  much done at work, school or at home? 3   2.  During the past 4 weeks, how often have you had shortness of breath? 3   3.  During the past 4 weeks, how often did your asthma symptoms (wheezing, coughing, shortness of breath, chest tightness or pain) wake you up at night or earlier than usual in the morning? 3   4.  During the past 4 weeks, how often have you used your rescue inhaler or nebulizer medication (such as albuterol)? 4   5.  How would you rate your asthma control during the past 4 weeks? 3   ACT TOTAL SCORE (Goal Greater than or Equal to 20) 16    In the past 12 months, how many times did you visit the emergency room for your asthma without being admitted to the hospital? 0   In the past 12 months, how many times were you hospitalized overnight because of your asthma? 0   Do you have a cough, wheezing or shortness of breath? (!) TROUBLE WITH BREATHING (SHORTNESS OF BREATH)   What makes your asthma/breathing worse? Exercise or sports    Emotions    Cold air   Do you want more information about how to use your inhaler? No       Patient-reported    Multiple values from one day are sorted in reverse-chronological order       Annual Wellness Visit     Patient has been advised of split billing requirements and indicates understanding: N/A      Health Care Directive  Patient does not have a Health Care Directive: Discussed advance care planning with patient; however, patient declined at this time.      7/10/2025   General Health   How would you rate your overall physical health? (!) FAIR   Feel stress (tense, anxious, or unable to sleep) Only a little          7/10/2025   Nutrition   Diet: Low salt    Low fat/cholesterol    Gluten-free/reduced    Other   If other, please elaborate: reduced fat       Multiple values from one day are sorted in reverse-chronological order         7/10/2025   Exercise   Days per week of moderate/strenous exercise 1 day     (!) EXERCISE CONCERN      7/10/2025   Social Factors    Frequency of gathering with friends or relatives More than three times a week   Worry food won't last until get money to buy more No   Food not last or not have enough money for food? No   Do you have housing? (Housing is defined as stable permanent housing and does not include staying outside in a car, in a tent, in an abandoned building, in an overnight shelter, or couch-surfing.) Yes   Are you worried about losing your housing? No   Lack of transportation? No   Unable to get utilities (heat,electricity)? No           7/10/2025   Fall Risk   Fallen 2 or more times in the past year? No    No   Trouble with walking or balance? No    No       Multiple values from one day are sorted in reverse-chronological order          7/10/2025   Activities of Daily Living- Home Safety   Needs help with the following daily activites None of the above   Safety concerns in the home None of the above         7/10/2025   Dental   Dentist two times every year? (!) NO         7/10/2025   Hearing Screening   Hearing concerns? (!) I FEEL THAT PEOPLE ARE MUMBLING OR NOT SPEAKING CLEARLY.   Would you like a referral for hearing testing? (!) YES         7/10/2025   Driving Risk Screening   Patient/family members have concerns about driving (!) DECLINE         7/10/2025   General Alertness/Fatigue Screening   Have you been more tired than usual lately? (!) YES         7/10/2025   Urinary Incontinence Screening   Bothered by leaking urine in past 6 months Yes         2024   TB Screening   Were you born outside of the [place]? Yes         7/10/2025   Substance Use   Alcohol more than 3/day or more than 7/wk No   Do you have a current opioid prescription? No   How severe/bad is pain from 1 to 10? 7/10   Do you use any other substances recreationally? No     Social History     Tobacco Use    Smoking status: Former     Current packs/day: 0.00     Types: Cigarettes     Quit date: 2018     Years since quittin.3    Smokeless tobacco: Never     Tobacco comments:     Smoked a few cigarettes every day for 40 years.   Vaping Use    Vaping status: Never Used   Substance Use Topics    Alcohol use: No    Drug use: No           Today's PHQ-2 Score:       2/14/2025     1:09 PM   PHQ-2 ( 1999 [employer])   Q1: Little interest or pleasure in doing things 0   Q2: Feeling down, depressed or hopeless 0   PHQ-2 Score 0    Q1: Little interest or pleasure in doing things Not at all   Q2: Feeling down, depressed or hopeless Not at all   PHQ-2 Score 0       Patient-reported         2/16/2022   LAST [facility]-7 RESULTS   1st degree relative breast or ovarian cancer No    Unknown   Any relative bilateral breast cancer No    Unknown   Any male have breast cancer No    Unknown   Any ONE woman have BOTH breast AND ovarian cancer No    Unknown   Any woman with breast cancer before 50yrs No    Unknown   2 or more relatives with breast AND/OR ovarian cancer No    Unknown   2 or more relatives with breast AND/OR bowel cancer No    Unknown       Multiple values from one day are sorted in reverse-chronological order        Mammogram Screening - After age 74- determine frequency with patient based on health status, life expectancy and patient goals    ASCVD Risk   The 10-year ASCVD risk score (Phuong HARLEY, et al., 2019) is: 14.5%    Values used to calculate the score:      Age: 75 years      Sex: Female      Is Non- : No      Diabetic: No      Tobacco smoker: No      Systolic Blood Pressure: 112 mmHg      Is BP treated: Yes      HDL Cholesterol: 72 mg/dL      Total Cholesterol: 168 mg/dL      Reviewed and updated as needed this visit by Provider   Tobacco  Allergies  Meds  Problems  Med Hx  Surg Hx  Fam Hx            Past Medical History:   Diagnosis Date    Essential hypertension 2/27/2019    Obesity (BMI 35.0-39.9) with comorbidity (H) 2/27/2019     Past Surgical History:   Procedure Laterality Date    ANESTHESIA CARDIOVERSION N/A 3/17/2025     Procedure: Anesthesia cardioversion;  Surgeon: GENERIC ANESTHESIA PROVIDER;  Location:  OR    CHOLECYSTECTOMY  2018     OB History   No obstetric history on file.     Lab work is in process  Labs reviewed in EPIC  BP Readings from Last 3 Encounters:   07/10/25 112/80   25 126/86   04/10/25 (!) 144/94    Wt Readings from Last 3 Encounters:   07/10/25 107 kg (236 lb)   25 103.9 kg (229 lb 1.6 oz)   04/10/25 102.8 kg (226 lb 9.6 oz)                  Patient Active Problem List   Diagnosis    Essential hypertension    Obesity (BMI 35.0-39.9) with comorbidity (H)    Aortic root dilatation    External hemorrhoids    Chronic kidney disease, stage 3 (H)    Allergic bronchitis without complication    Moderate persistent asthma without complication    Posttraumatic stress disorder    Dyslipidemia    Cerebrovascular disease    Atrial fibrillation with rapid ventricular response (H)    Influenza A    New onset atrial fibrillation (H)    Atrial fibrillation with RVR (H)    Elevated brain natriuretic peptide (BNP) level    On continuous oral anticoagulation    Chronic purulent otitis media    Chronic suppurative otitis media of both ears, unspecified otitis media location    Mixed conductive and sensorineural hearing loss of both ears     Past Surgical History:   Procedure Laterality Date    ANESTHESIA CARDIOVERSION N/A 3/17/2025    Procedure: Anesthesia cardioversion;  Surgeon: GENERIC ANESTHESIA PROVIDER;  Location:  OR    CHOLECYSTECTOMY         Social History     Tobacco Use    Smoking status: Former     Current packs/day: 0.00     Types: Cigarettes     Quit date: 2018     Years since quittin.3    Smokeless tobacco: Never    Tobacco comments:     Smoked a few cigarettes every day for 40 years.   Substance Use Topics    Alcohol use: No     Family History   Problem Relation Age of Onset    Cerebrovascular Disease Mother     Hypertension Mother     Hyperlipidemia Mother     Obesity Mother      Cerebrovascular Disease Sister 40    Unknown/Adopted Father          Current Outpatient Medications   Medication Sig Dispense Refill    albuterol (PROAIR HFA/PROVENTIL HFA/VENTOLIN HFA) 108 (90 Base) MCG/ACT inhaler Inhale 2 puffs into the lungs every 4 hours as needed for shortness of breath, wheezing or cough. 18 g 0    fluticasone-salmeterol (ADVAIR DISKUS) 500-50 MCG/ACT inhaler INHALE 1 PUFF INTO THE LUNGS EVERY 12 HOURS 180 each 2    furosemide (LASIX) 20 MG tablet Take 0.5 tablets (10 mg) by mouth daily. 45 tablet 3    levalbuterol (XOPENEX) 1.25 MG/3ML neb solution Take 3 mLs (1.25 mg) by nebulization 2 times daily. 540 mL 1    levalbuterol (XOPENEX) 1.25 MG/3ML neb solution USE 1 VIAL VIA NEBULIZER EVERY 6 HOURS AS NEEDED FOR WHEEZING 9 mL 0    metoprolol tartrate (LOPRESSOR) 50 MG tablet Take 1 tablet (50 mg) by mouth 2 times daily. 180 tablet 3    ofloxacin (FLOXIN) 0.3 % otic solution INSTILL 5 DROPS TO AFFECTED EAR TWICE DAILY FOR 10 DAYS AS NEEDED FOR EAR INFECTION      omeprazole (PRILOSEC) 40 MG DR capsule Take 1 capsule (40 mg) by mouth daily. 90 capsule 1    rivaroxaban ANTICOAGULANT (XARELTO) 20 MG TABS tablet Take 1 tablet (20 mg) by mouth daily (with breakfast). 90 tablet 3    sacubitril-valsartan (ENTRESTO) 24-26 MG per tablet Take 1 tablet by mouth 2 times daily. 60 tablet 11    simvastatin (ZOCOR) 10 MG tablet Take 1 tablet (10 mg) by mouth at bedtime. 90 tablet 1     No Known Allergies  Recent Labs   Lab Test 07/10/25  1118 04/25/25  1203 04/09/25  1110 02/04/25  0641 02/03/25  0653 01/28/25  0823 01/27/25  2118 01/27/25  1429 06/06/24  1035 11/07/23  1557 05/31/23  0935 02/03/22  1154 06/11/21  1442 01/05/21  0846 09/15/20  1104   A1C 5.7*  --   --   --   --   --   --   --   --   --   --   --   --  5.7* 5.4   LDL  --   --  83  --   --   --   --   --  113*  --  129*   < > 100*  --   --    HDL  --   --  72  --   --   --   --   --  67  --  64   < > 55  --   --    TRIG  --   --  64  --   --    --   --   --  88  --  52   < > 150*  --   --    ALT  --   --  29  --  88*  --   --   --  21   < > 22   < >  --   --  29   CR  --  1.03* 1.04*   < > 1.20*   < >  --    < > 0.92   < > 0.90   < > 0.98  --  1.01   GFRESTIMATED  --  57* 56*   < > 48*   < >  --    < > 65   < > 68   < > 59*  --  57*   GFRESTBLACK  --   --   --   --   --   --   --   --   --   --   --   --  68  --  66   POTASSIUM  --  4.3 4.1   < > 3.9   < >  --    < > 3.9   < > 3.7   < > 4.0  --  4.6   TSH  --   --   --   --   --   --  0.70  --   --   --  2.59  --   --   --   --     < > = values in this interval not displayed.        Current providers sharing in care for this patient include:  Patient Care Team:  Amber Raya MD as PCP - General (Internal Medicine)  Amber Raya MD as Assigned PCP  Cammie To PA-C as Assigned Heart and Vascular Provider    The following health maintenance items are reviewed in Epic and correct as of today:  Health Maintenance   Topic Date Due    DEXA  Never done    HF ACTION PLAN  Never done    ASTHMA ACTION PLAN  Never done    PNEUMOCOCCAL VACCINE 50+ YEARS (1 of 2 - PCV) Never done    ZOSTER VACCINE (1 of 2) Never done    RSV VACCINE (1 - Risk 60-74 years 1-dose series) Never done    DTAP/TDAP/TD VACCINE (1 - Tdap) 06/01/2023    MAMMO SCREENING  02/16/2024    COLORECTAL CANCER SCREENING  05/31/2024    COVID-19 VACCINE (5 - 2024-25 season) 09/01/2024    MICROALBUMIN  06/06/2025    INFLUENZA VACCINE (1) 09/01/2025    BMP  10/25/2025    ASTHMA CONTROL TEST  01/10/2026    LUNG CANCER SCREENING  02/01/2026    ANNUAL REVIEW OF HM ORDERS  02/14/2026    ALT  04/09/2026    LIPID  04/09/2026    MEDICARE ANNUAL WELLNESS VISIT  07/10/2026    FALL RISK ASSESSMENT  07/10/2026    CBC  07/10/2026    HEMOGLOBIN  07/10/2026    DIABETES SCREENING  04/25/2028    ADVANCE CARE PLANNING  07/10/2030    TSH W/FREE T4 REFLEX  Completed    HEPATITIS C SCREENING  Completed    PHQ-2 (once per calendar year)  Completed     URINALYSIS  Completed    HPV VACCINE  Aged Out    MENINGITIS VACCINE  Aged Out       Appropriate preventive services were discussed with this patient, including applicable screening as appropriate for fall prevention, nutrition, physical activity, Tobacco-use cessation, weight loss and cognition.  Checklist reviewing preventive services available has been given to the patient.          7/10/2025   Mini Cog   Mini-Cog Not Completed (choose reason) Patient declines            No Known Allergies     Past Medical History:   Diagnosis Date    Essential hypertension 2019    Obesity (BMI 35.0-39.9) with comorbidity (H) 2019       Past Surgical History:   Procedure Laterality Date    ANESTHESIA CARDIOVERSION N/A 3/17/2025    Procedure: Anesthesia cardioversion;  Surgeon: GENERIC ANESTHESIA PROVIDER;  Location: [facility] OR    CHOLECYSTECTOMY         Family History   Problem Relation Age of Onset    Cerebrovascular Disease Mother     Hypertension Mother     Hyperlipidemia Mother     Obesity Mother     Cerebrovascular Disease Sister 40    Unknown/Adopted Father        Social History     Tobacco Use    Smoking status: Former     Current packs/day: 0.00     Types: Cigarettes     Quit date: 2018     Years since quittin.3    Smokeless tobacco: Never    Tobacco comments:     Smoked a few cigarettes every day for 40 years.   Substance Use Topics    Alcohol use: No        Current Outpatient Medications   Medication Sig Dispense Refill    albuterol (PROAIR HFA/PROVENTIL HFA/VENTOLIN HFA) 108 (90 Base) MCG/ACT inhaler Inhale 2 puffs into the lungs every 4 hours as needed for shortness of breath, wheezing or cough. 18 g 0    fluticasone-salmeterol (ADVAIR DISKUS) 500-50 MCG/ACT inhaler INHALE 1 PUFF INTO THE LUNGS EVERY 12 HOURS 180 each 2    furosemide (LASIX) 20 MG tablet Take 0.5 tablets (10 mg) by mouth daily. 45 tablet 3    levalbuterol (XOPENEX) 1.25 MG/3ML neb solution Take 3 mLs (1.25 mg) by nebulization 2 times  "daily. 540 mL 1    levalbuterol (XOPENEX) 1.25 MG/3ML neb solution USE 1 VIAL VIA NEBULIZER EVERY 6 HOURS AS NEEDED FOR WHEEZING 9 mL 0    metoprolol tartrate (LOPRESSOR) 50 MG tablet Take 1 tablet (50 mg) by mouth 2 times daily. 180 tablet 3    ofloxacin (FLOXIN) 0.3 % otic solution INSTILL 5 DROPS TO AFFECTED EAR TWICE DAILY FOR 10 DAYS AS NEEDED FOR EAR INFECTION      omeprazole (PRILOSEC) 40 MG DR capsule Take 1 capsule (40 mg) by mouth daily. 90 capsule 1    rivaroxaban ANTICOAGULANT (XARELTO) 20 MG TABS tablet Take 1 tablet (20 mg) by mouth daily (with breakfast). 90 tablet 3    sacubitril-valsartan (ENTRESTO) 24-26 MG per tablet Take 1 tablet by mouth 2 times daily. 60 tablet 11    simvastatin (ZOCOR) 10 MG tablet Take 1 tablet (10 mg) by mouth at bedtime. 90 tablet 1     No current facility-administered medications for this visit.              Review of Systems  Constitutional, HEENT, cardiovascular, pulmonary, GI, , musculoskeletal, neuro, skin, endocrine and psych systems are negative, except as otherwise noted.      Objective    /80 (BP Location: Left arm, Patient Position: Sitting, Cuff Size: Adult Large)   Pulse 67   Temp 97.5  F (36.4  C) (Temporal)   Ht 1.645 m (5' 4.76\")   Wt 107 kg (236 lb)   HC 23 cm (9.06\")   SpO2 97%   BMI 39.56 kg/m    Body mass index is 39.56 kg/m .  Physical Exam   GENERAL: alert and no distress  EYES: Eyes grossly normal to inspection, PERRL and conjunctivae and sclerae normal  HENT: ear canals and TM's normal, nose and mouth without ulcers or lesions  NECK: no adenopathy, no asymmetry, masses, or scars  RESP: lungs clear to auscultation - no rales, rhonchi or wheezes  BREAST: Deferred, mammogram  CV: regular rate and rhythm, normal S1 S2, no S3 or S4, no murmur, click or rub, no peripheral edema  ABDOMEN: soft, nontender, no hepatosplenomegaly, no masses and bowel sounds normal  MS: no gross musculoskeletal defects noted, positive for trace edema  SKIN: no " suspicious lesions or rashes  NEURO: Normal strength and tone, mentation intact and speech normal  PSYCH: mentation appears normal, affect normal/bright      Signed Electronically by: Amber Raya MD

## 2025-08-04 ENCOUNTER — TELEPHONE (OUTPATIENT)
Dept: CARDIOLOGY | Facility: CLINIC | Age: 75
End: 2025-08-04
Payer: COMMERCIAL

## 2025-08-18 DIAGNOSIS — J45.40 MODERATE PERSISTENT ASTHMA WITHOUT COMPLICATION: ICD-10-CM

## 2025-08-18 RX ORDER — LEVALBUTEROL INHALATION SOLUTION 1.25 MG/3ML
SOLUTION RESPIRATORY (INHALATION)
Qty: 525 ML | Refills: 1 | Status: SHIPPED | OUTPATIENT
Start: 2025-08-18

## 2025-08-19 ENCOUNTER — TELEPHONE (OUTPATIENT)
Dept: FAMILY MEDICINE | Facility: CLINIC | Age: 75
End: 2025-08-19
Payer: COMMERCIAL

## 2025-08-21 ENCOUNTER — VIRTUAL VISIT (OUTPATIENT)
Dept: FAMILY MEDICINE | Facility: CLINIC | Age: 75
End: 2025-08-21
Payer: COMMERCIAL

## 2025-08-21 DIAGNOSIS — J42 CHRONIC BRONCHITIS, UNSPECIFIED CHRONIC BRONCHITIS TYPE (H): ICD-10-CM

## 2025-08-21 DIAGNOSIS — Z12.11 SCREEN FOR COLON CANCER: ICD-10-CM

## 2025-08-21 DIAGNOSIS — J45.40 MODERATE PERSISTENT ASTHMA WITHOUT COMPLICATION: ICD-10-CM

## 2025-08-21 DIAGNOSIS — I50.22 CHRONIC SYSTOLIC CONGESTIVE HEART FAILURE (H): ICD-10-CM

## 2025-08-21 DIAGNOSIS — Z00.00 ROUTINE GENERAL MEDICAL EXAMINATION AT A HEALTH CARE FACILITY: ICD-10-CM

## 2025-08-21 DIAGNOSIS — Z12.31 ENCOUNTER FOR SCREENING MAMMOGRAM FOR BREAST CANCER: ICD-10-CM

## 2025-08-21 DIAGNOSIS — N18.31 STAGE 3A CHRONIC KIDNEY DISEASE (H): ICD-10-CM

## 2025-08-21 DIAGNOSIS — Z79.01 ON CONTINUOUS ORAL ANTICOAGULATION: ICD-10-CM

## 2025-08-21 DIAGNOSIS — I50.22 CHRONIC SYSTOLIC CONGESTIVE HEART FAILURE (H): Primary | ICD-10-CM

## 2025-08-21 DIAGNOSIS — I48.20 CHRONIC ATRIAL FIBRILLATION (H): ICD-10-CM

## 2025-08-21 PROBLEM — I48.91 ATRIAL FIBRILLATION WITH RAPID VENTRICULAR RESPONSE (H): Status: RESOLVED | Noted: 2025-01-27 | Resolved: 2025-08-21

## 2025-08-21 PROBLEM — I48.91 ATRIAL FIBRILLATION WITH RVR (H): Status: RESOLVED | Noted: 2025-01-27 | Resolved: 2025-08-21

## 2025-08-21 PROBLEM — I48.91 NEW ONSET ATRIAL FIBRILLATION (H): Status: RESOLVED | Noted: 2025-01-27 | Resolved: 2025-08-21

## 2025-08-21 RX ORDER — ALBUTEROL SULFATE 90 UG/1
2 INHALANT RESPIRATORY (INHALATION) EVERY 6 HOURS PRN
Qty: 18 G | Refills: 0 | Status: SHIPPED | OUTPATIENT
Start: 2025-08-21

## 2025-08-21 RX ORDER — LEVALBUTEROL INHALATION SOLUTION 1.25 MG/3ML
SOLUTION RESPIRATORY (INHALATION)
Qty: 75 ML | OUTPATIENT
Start: 2025-08-21

## 2025-08-21 RX ORDER — FUROSEMIDE 20 MG/1
10 TABLET ORAL DAILY
Qty: 45 TABLET | Refills: 3 | Status: CANCELLED | OUTPATIENT
Start: 2025-08-21

## 2025-08-21 RX ORDER — ALBUTEROL SULFATE 90 UG/1
AEROSOL, METERED RESPIRATORY (INHALATION)
Qty: 18 G | Refills: 0 | OUTPATIENT
Start: 2025-08-21

## 2025-08-21 RX ORDER — FUROSEMIDE 20 MG/1
20 TABLET ORAL DAILY
Qty: 90 TABLET | Refills: 1 | Status: SHIPPED | OUTPATIENT
Start: 2025-08-21

## 2025-08-21 ASSESSMENT — ASTHMA QUESTIONNAIRES: ACT_TOTALSCORE: 20
